# Patient Record
Sex: MALE | Race: WHITE | NOT HISPANIC OR LATINO | URBAN - METROPOLITAN AREA
[De-identification: names, ages, dates, MRNs, and addresses within clinical notes are randomized per-mention and may not be internally consistent; named-entity substitution may affect disease eponyms.]

---

## 2017-04-13 ENCOUNTER — EMERGENCY (EMERGENCY)
Facility: HOSPITAL | Age: 77
LOS: 1 days | Discharge: HOME | End: 2017-04-13
Attending: EMERGENCY MEDICINE
Payer: MEDICARE

## 2017-04-13 VITALS
OXYGEN SATURATION: 97 % | SYSTOLIC BLOOD PRESSURE: 136 MMHG | TEMPERATURE: 99 F | RESPIRATION RATE: 18 BRPM | WEIGHT: 160.06 LBS | DIASTOLIC BLOOD PRESSURE: 75 MMHG | HEART RATE: 77 BPM | HEIGHT: 71 IN

## 2017-04-13 DIAGNOSIS — K59.00 CONSTIPATION, UNSPECIFIED: ICD-10-CM

## 2017-04-13 DIAGNOSIS — J00 ACUTE NASOPHARYNGITIS [COMMON COLD]: ICD-10-CM

## 2017-04-13 LAB
ALBUMIN SERPL ELPH-MCNC: 3.6 G/DL — SIGNIFICANT CHANGE UP (ref 3.5–5)
ALP SERPL-CCNC: 48 U/L — SIGNIFICANT CHANGE UP (ref 40–120)
ALT FLD-CCNC: 27 U/L DA — SIGNIFICANT CHANGE UP (ref 10–60)
ANION GAP SERPL CALC-SCNC: 7 MMOL/L — SIGNIFICANT CHANGE UP (ref 5–17)
APPEARANCE UR: CLEAR — SIGNIFICANT CHANGE UP
AST SERPL-CCNC: 30 U/L — SIGNIFICANT CHANGE UP (ref 10–40)
BILIRUB SERPL-MCNC: 0.4 MG/DL — SIGNIFICANT CHANGE UP (ref 0.2–1.2)
BILIRUB UR-MCNC: NEGATIVE — SIGNIFICANT CHANGE UP
BUN SERPL-MCNC: 10 MG/DL — SIGNIFICANT CHANGE UP (ref 7–18)
CALCIUM SERPL-MCNC: 8.6 MG/DL — SIGNIFICANT CHANGE UP (ref 8.4–10.5)
CHLORIDE SERPL-SCNC: 96 MMOL/L — SIGNIFICANT CHANGE UP (ref 96–108)
CO2 SERPL-SCNC: 29 MMOL/L — SIGNIFICANT CHANGE UP (ref 22–31)
COLOR SPEC: YELLOW — SIGNIFICANT CHANGE UP
CREAT SERPL-MCNC: 0.56 MG/DL — SIGNIFICANT CHANGE UP (ref 0.5–1.3)
DIFF PNL FLD: NEGATIVE — SIGNIFICANT CHANGE UP
EOSINOPHIL NFR BLD AUTO: 1 % — SIGNIFICANT CHANGE UP (ref 0–6)
GLUCOSE SERPL-MCNC: 125 MG/DL — HIGH (ref 70–99)
GLUCOSE UR QL: NEGATIVE — SIGNIFICANT CHANGE UP
HCT VFR BLD CALC: 36.6 % — LOW (ref 39–50)
HGB BLD-MCNC: 12.7 G/DL — LOW (ref 13–17)
KETONES UR-MCNC: NEGATIVE — SIGNIFICANT CHANGE UP
LEUKOCYTE ESTERASE UR-ACNC: NEGATIVE — SIGNIFICANT CHANGE UP
LYMPHOCYTES # BLD AUTO: 60 % — HIGH (ref 13–44)
MANUAL DIF COMMENT BLD-IMP: SIGNIFICANT CHANGE UP
MCHC RBC-ENTMCNC: 34.4 PG — HIGH (ref 27–34)
MCHC RBC-ENTMCNC: 34.8 GM/DL — SIGNIFICANT CHANGE UP (ref 32–36)
MCV RBC AUTO: 98.8 FL — SIGNIFICANT CHANGE UP (ref 80–100)
MONOCYTES NFR BLD AUTO: 1 % — LOW (ref 2–14)
NEUTROPHILS NFR BLD AUTO: 26 % — LOW (ref 43–77)
NITRITE UR-MCNC: NEGATIVE — SIGNIFICANT CHANGE UP
PH UR: 7 — SIGNIFICANT CHANGE UP (ref 4.8–8)
PLAT MORPH BLD: NORMAL — SIGNIFICANT CHANGE UP
PLATELET # BLD AUTO: 175 K/UL — SIGNIFICANT CHANGE UP (ref 150–400)
POTASSIUM SERPL-MCNC: 4.2 MMOL/L — SIGNIFICANT CHANGE UP (ref 3.5–5.3)
POTASSIUM SERPL-SCNC: 4.2 MMOL/L — SIGNIFICANT CHANGE UP (ref 3.5–5.3)
PROT SERPL-MCNC: 6.4 G/DL — SIGNIFICANT CHANGE UP (ref 6–8.3)
PROT UR-MCNC: NEGATIVE — SIGNIFICANT CHANGE UP
RBC # BLD: 3.7 M/UL — LOW (ref 4.2–5.8)
RBC # FLD: 11 % — SIGNIFICANT CHANGE UP (ref 10.3–14.5)
RBC BLD AUTO: NORMAL — SIGNIFICANT CHANGE UP
SODIUM SERPL-SCNC: 132 MMOL/L — LOW (ref 135–145)
SP GR SPEC: 1.01 — SIGNIFICANT CHANGE UP (ref 1.01–1.02)
UROBILINOGEN FLD QL: NEGATIVE — SIGNIFICANT CHANGE UP
VARIANT LYMPHS # BLD: 12 % — HIGH (ref 0–6)
WBC # BLD: 12.8 K/UL — HIGH (ref 3.8–10.5)
WBC # FLD AUTO: 12.8 K/UL — HIGH (ref 3.8–10.5)

## 2017-04-13 PROCEDURE — 99283 EMERGENCY DEPT VISIT LOW MDM: CPT | Mod: 25

## 2017-04-13 PROCEDURE — 81003 URINALYSIS AUTO W/O SCOPE: CPT

## 2017-04-13 PROCEDURE — 74018 RADEX ABDOMEN 1 VIEW: CPT

## 2017-04-13 PROCEDURE — 80053 COMPREHEN METABOLIC PANEL: CPT

## 2017-04-13 PROCEDURE — 99283 EMERGENCY DEPT VISIT LOW MDM: CPT

## 2017-04-13 PROCEDURE — 74000: CPT | Mod: 26

## 2017-04-13 PROCEDURE — 85027 COMPLETE CBC AUTOMATED: CPT

## 2017-04-13 RX ORDER — SODIUM CHLORIDE 9 MG/ML
1000 INJECTION INTRAMUSCULAR; INTRAVENOUS; SUBCUTANEOUS ONCE
Qty: 0 | Refills: 0 | Status: COMPLETED | OUTPATIENT
Start: 2017-04-13 | End: 2017-04-13

## 2017-04-13 RX ADMIN — SODIUM CHLORIDE 1000 MILLILITER(S): 9 INJECTION INTRAMUSCULAR; INTRAVENOUS; SUBCUTANEOUS at 09:27

## 2017-04-13 NOTE — ED PROVIDER NOTE - NS ED MD SCRIBE ATTENDING SCRIBE SECTIONS
REVIEW OF SYSTEMS/PROGRESS NOTE/HIV/HISTORY OF PRESENT ILLNESS/VITAL SIGNS( Pullset)/PHYSICAL EXAM/DISPOSITION

## 2017-04-13 NOTE — ED PROVIDER NOTE - OBJECTIVE STATEMENT
77 y/o male with PMHx of CLL (olast labs checked Jan 2017), Anxiety, Depression, Umbilical hernia, Cataracts s/p surgery, Varicocele, Hydrocele presents to the ED for feeling "cold at night" (R leg colder than L) and constipation x 1 month. Pt tried to take Doculax to mild relief in Sx. Pt states that he has not seen his PMD for his Sx, but has an appointment later today. Pt also notes that he has been taking Lexapro Rx by Psychiatrist recently. Pt denies fever, chills, n/v/d, difficulty walking/balance or any other complaints. NKDA. PMD: Dr. Ulices Farias.

## 2017-04-13 NOTE — ED PROVIDER NOTE - MEDICAL DECISION MAKING DETAILS
Plan to check labs, urine then reassess. Plan to check labs, urine then reassess.  mild hyponatremia. patient will followup with Dr Farias today.

## 2017-04-13 NOTE — ED ADULT TRIAGE NOTE - CHIEF COMPLAINT QUOTE
Patient c/o feeling cold and has constipation as per EMS. Patient c/o feeling cold and has constipation x 4 days as per EMS.

## 2017-04-13 NOTE — ED PROVIDER NOTE - PMH
Anxiety    Cataract    CLL (chronic lymphocytic leukemia)    Depression    Hydrocele    Prostate cancer    Umbilical hernia    Varicocele

## 2017-04-16 ENCOUNTER — EMERGENCY (EMERGENCY)
Facility: HOSPITAL | Age: 77
LOS: 1 days | Discharge: HOME | End: 2017-04-16
Attending: EMERGENCY MEDICINE
Payer: MEDICARE

## 2017-04-16 VITALS
HEIGHT: 71 IN | RESPIRATION RATE: 16 BRPM | OXYGEN SATURATION: 100 % | TEMPERATURE: 98 F | WEIGHT: 179.9 LBS | SYSTOLIC BLOOD PRESSURE: 138 MMHG | HEART RATE: 64 BPM | DIASTOLIC BLOOD PRESSURE: 77 MMHG

## 2017-04-16 VITALS
RESPIRATION RATE: 14 BRPM | SYSTOLIC BLOOD PRESSURE: 113 MMHG | OXYGEN SATURATION: 100 % | HEART RATE: 70 BPM | DIASTOLIC BLOOD PRESSURE: 52 MMHG

## 2017-04-16 DIAGNOSIS — T73.0XXA STARVATION, INITIAL ENCOUNTER: ICD-10-CM

## 2017-04-16 DIAGNOSIS — Z91.040 LATEX ALLERGY STATUS: ICD-10-CM
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
## 2017-04-16 DIAGNOSIS — Z88.8 ALLERGY STATUS TO OTHER DRUGS, MEDICAMENTS AND BIOLOGICAL SUBSTANCES STATUS: ICD-10-CM
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
## 2017-04-16 PROCEDURE — 99283 EMERGENCY DEPT VISIT LOW MDM: CPT | Mod: 25

## 2017-04-16 PROCEDURE — 99283 EMERGENCY DEPT VISIT LOW MDM: CPT

## 2017-04-16 PROCEDURE — 36000 PLACE NEEDLE IN VEIN: CPT

## 2017-04-16 RX ORDER — SODIUM CHLORIDE 9 MG/ML
1000 INJECTION INTRAMUSCULAR; INTRAVENOUS; SUBCUTANEOUS ONCE
Qty: 0 | Refills: 0 | Status: COMPLETED | OUTPATIENT
Start: 2017-04-16 | End: 2017-04-16

## 2017-04-16 RX ADMIN — SODIUM CHLORIDE 4000 MILLILITER(S): 9 INJECTION INTRAMUSCULAR; INTRAVENOUS; SUBCUTANEOUS at 06:24

## 2017-04-16 NOTE — ED PROVIDER NOTE - OBJECTIVE STATEMENT
76M with h/o right inguinal hernia, having social issues at home (reports feeling cold, not having money to buy food) and worried about losing his health insurance, here tonight because he was hungry and wanted to speak with a . Scheduled for surgical appointment for hernia repair in 1 day.

## 2017-04-22 ENCOUNTER — EMERGENCY (EMERGENCY)
Facility: HOSPITAL | Age: 77
LOS: 1 days | Discharge: ANOTHER HOSPITAL | End: 2017-04-22
Attending: EMERGENCY MEDICINE
Payer: MEDICARE

## 2017-04-22 VITALS
OXYGEN SATURATION: 99 % | HEIGHT: 71 IN | WEIGHT: 160.06 LBS | DIASTOLIC BLOOD PRESSURE: 84 MMHG | HEART RATE: 77 BPM | SYSTOLIC BLOOD PRESSURE: 155 MMHG | TEMPERATURE: 98 F | RESPIRATION RATE: 16 BRPM

## 2017-04-22 VITALS
RESPIRATION RATE: 18 BRPM | SYSTOLIC BLOOD PRESSURE: 134 MMHG | HEART RATE: 68 BPM | TEMPERATURE: 98 F | DIASTOLIC BLOOD PRESSURE: 74 MMHG | OXYGEN SATURATION: 96 %

## 2017-04-22 DIAGNOSIS — Z85.46 PERSONAL HISTORY OF MALIGNANT NEOPLASM OF PROSTATE: ICD-10-CM

## 2017-04-22 DIAGNOSIS — F41.9 ANXIETY DISORDER, UNSPECIFIED: ICD-10-CM

## 2017-04-22 DIAGNOSIS — R62.7 ADULT FAILURE TO THRIVE: ICD-10-CM

## 2017-04-22 DIAGNOSIS — Z91.040 LATEX ALLERGY STATUS: ICD-10-CM

## 2017-04-22 DIAGNOSIS — F32.9 MAJOR DEPRESSIVE DISORDER, SINGLE EPISODE, UNSPECIFIED: ICD-10-CM

## 2017-04-22 DIAGNOSIS — Z85.6 PERSONAL HISTORY OF LEUKEMIA: ICD-10-CM

## 2017-04-22 DIAGNOSIS — R68.89 OTHER GENERAL SYMPTOMS AND SIGNS: ICD-10-CM

## 2017-04-22 DIAGNOSIS — R69 ILLNESS, UNSPECIFIED: ICD-10-CM

## 2017-04-22 DIAGNOSIS — Z98.42 CATARACT EXTRACTION STATUS, LEFT EYE: ICD-10-CM

## 2017-04-22 DIAGNOSIS — F32.89 OTHER SPECIFIED DEPRESSIVE EPISODES: ICD-10-CM

## 2017-04-22 DIAGNOSIS — Z88.4 ALLERGY STATUS TO ANESTHETIC AGENT: ICD-10-CM

## 2017-04-22 LAB
ALBUMIN SERPL ELPH-MCNC: 3.6 G/DL — SIGNIFICANT CHANGE UP (ref 3.5–5)
ALP SERPL-CCNC: 47 U/L — SIGNIFICANT CHANGE UP (ref 40–120)
ALT FLD-CCNC: 33 U/L DA — SIGNIFICANT CHANGE UP (ref 10–60)
ANION GAP SERPL CALC-SCNC: 7 MMOL/L — SIGNIFICANT CHANGE UP (ref 5–17)
APAP SERPL-MCNC: 2 UG/ML — LOW (ref 10–30)
APPEARANCE UR: CLEAR — SIGNIFICANT CHANGE UP
AST SERPL-CCNC: 29 U/L — SIGNIFICANT CHANGE UP (ref 10–40)
BASOPHILS # BLD AUTO: 0.1 K/UL — SIGNIFICANT CHANGE UP (ref 0–0.2)
BASOPHILS NFR BLD AUTO: 0.8 % — SIGNIFICANT CHANGE UP (ref 0–2)
BILIRUB SERPL-MCNC: 0.3 MG/DL — SIGNIFICANT CHANGE UP (ref 0.2–1.2)
BILIRUB UR-MCNC: NEGATIVE — SIGNIFICANT CHANGE UP
BUN SERPL-MCNC: 14 MG/DL — SIGNIFICANT CHANGE UP (ref 7–18)
CALCIUM SERPL-MCNC: 8.6 MG/DL — SIGNIFICANT CHANGE UP (ref 8.4–10.5)
CHLORIDE SERPL-SCNC: 96 MMOL/L — SIGNIFICANT CHANGE UP (ref 96–108)
CO2 SERPL-SCNC: 28 MMOL/L — SIGNIFICANT CHANGE UP (ref 22–31)
COLOR SPEC: YELLOW — SIGNIFICANT CHANGE UP
CREAT SERPL-MCNC: 0.54 MG/DL — SIGNIFICANT CHANGE UP (ref 0.5–1.3)
DIFF PNL FLD: ABNORMAL
EOSINOPHIL # BLD AUTO: 0.1 K/UL — SIGNIFICANT CHANGE UP (ref 0–0.5)
EOSINOPHIL NFR BLD AUTO: 0.5 % — SIGNIFICANT CHANGE UP (ref 0–6)
ETHANOL SERPL-MCNC: <3 MG/DL — SIGNIFICANT CHANGE UP (ref 0–10)
GLUCOSE SERPL-MCNC: 97 MG/DL — SIGNIFICANT CHANGE UP (ref 70–99)
GLUCOSE UR QL: NEGATIVE — SIGNIFICANT CHANGE UP
HCT VFR BLD CALC: 37.4 % — LOW (ref 39–50)
HGB BLD-MCNC: 12.8 G/DL — LOW (ref 13–17)
KETONES UR-MCNC: NEGATIVE — SIGNIFICANT CHANGE UP
LEUKOCYTE ESTERASE UR-ACNC: NEGATIVE — SIGNIFICANT CHANGE UP
LYMPHOCYTES # BLD AUTO: 64.8 % — HIGH (ref 13–44)
LYMPHOCYTES # BLD AUTO: 9.7 K/UL — HIGH (ref 1–3.3)
MCHC RBC-ENTMCNC: 33.8 PG — SIGNIFICANT CHANGE UP (ref 27–34)
MCHC RBC-ENTMCNC: 34.2 GM/DL — SIGNIFICANT CHANGE UP (ref 32–36)
MCV RBC AUTO: 98.9 FL — SIGNIFICANT CHANGE UP (ref 80–100)
MONOCYTES # BLD AUTO: 0.4 K/UL — SIGNIFICANT CHANGE UP (ref 0–0.9)
MONOCYTES NFR BLD AUTO: 2.6 % — SIGNIFICANT CHANGE UP (ref 2–14)
NEUTROPHILS # BLD AUTO: 4.7 K/UL — SIGNIFICANT CHANGE UP (ref 1.8–7.4)
NEUTROPHILS NFR BLD AUTO: 31.4 % — LOW (ref 43–77)
NITRITE UR-MCNC: NEGATIVE — SIGNIFICANT CHANGE UP
PCP SPEC-MCNC: SIGNIFICANT CHANGE UP
PH UR: 6 — SIGNIFICANT CHANGE UP (ref 5–8)
PLATELET # BLD AUTO: 206 K/UL — SIGNIFICANT CHANGE UP (ref 150–400)
POTASSIUM SERPL-MCNC: 3.9 MMOL/L — SIGNIFICANT CHANGE UP (ref 3.5–5.3)
POTASSIUM SERPL-SCNC: 3.9 MMOL/L — SIGNIFICANT CHANGE UP (ref 3.5–5.3)
PROT SERPL-MCNC: 6.5 G/DL — SIGNIFICANT CHANGE UP (ref 6–8.3)
PROT UR-MCNC: 15
RBC # BLD: 3.78 M/UL — LOW (ref 4.2–5.8)
RBC # FLD: 11.5 % — SIGNIFICANT CHANGE UP (ref 10.3–14.5)
SODIUM SERPL-SCNC: 131 MMOL/L — LOW (ref 135–145)
SP GR SPEC: 1.02 — SIGNIFICANT CHANGE UP (ref 1.01–1.02)
TSH SERPL-MCNC: 2.06 UU/ML — SIGNIFICANT CHANGE UP (ref 0.34–4.82)
UROBILINOGEN FLD QL: NEGATIVE — SIGNIFICANT CHANGE UP
WBC # BLD: 15 K/UL — HIGH (ref 3.8–10.5)
WBC # FLD AUTO: 15 K/UL — HIGH (ref 3.8–10.5)

## 2017-04-22 PROCEDURE — 93005 ELECTROCARDIOGRAM TRACING: CPT

## 2017-04-22 PROCEDURE — 71010: CPT | Mod: 26

## 2017-04-22 PROCEDURE — 84443 ASSAY THYROID STIM HORMONE: CPT

## 2017-04-22 PROCEDURE — 80053 COMPREHEN METABOLIC PANEL: CPT

## 2017-04-22 PROCEDURE — 99285 EMERGENCY DEPT VISIT HI MDM: CPT | Mod: 25

## 2017-04-22 PROCEDURE — 70450 CT HEAD/BRAIN W/O DYE: CPT | Mod: 26

## 2017-04-22 PROCEDURE — 71045 X-RAY EXAM CHEST 1 VIEW: CPT

## 2017-04-22 PROCEDURE — 81001 URINALYSIS AUTO W/SCOPE: CPT

## 2017-04-22 PROCEDURE — 85027 COMPLETE CBC AUTOMATED: CPT

## 2017-04-22 PROCEDURE — 70450 CT HEAD/BRAIN W/O DYE: CPT

## 2017-04-22 PROCEDURE — 80307 DRUG TEST PRSMV CHEM ANLYZR: CPT

## 2017-04-22 NOTE — ED BEHAVIORAL HEALTH NOTE - BEHAVIORAL HEALTH NOTE
TELEPSYCHIATRY ENCOUNTER  **********************************************	  I have visualized that the patient is on an arms-length 1:1  I have visualized that patient is in a private space  I have confirmed with the patient that they understand and agree to the evaluation being performed via Telepsychiatry  I have discussed the above with Telepsychiatry Attending Dr. Collins  ************************************************  Behavioral Telehealth Care Manager COLLATERAL NOTE:  ************************************************  CHART REVIEW: 76 year old male brought into [facility] ED via EMS with complaints of blurred vision s/p cataract surgery 2 weeks ago. Pt also had complaints of feeling hot and cold at times-Similar complaints in the past with multiple ER visits over the last couple weeks. Patient remained calm and cooperative during ED Course. Pt did not require any PRN medications or restraints.   ~Sunrise~ REVIEWED  ~Alpha~ N/A  ~CVM~ N/A  ~TIER~ N/A  ~Healthix~ N/A  ~Psyckes~ N/A  *************************************************  COLLATERAL CONTACT: MARYANNE CAMPOS  *************************************************  *NUMBER: [phone]  *RELATIONSHIP: Niece  *RELIABILITY: Reliable  *OPINION RE PATIENT RELIABILITY: Reliable  *OPINION RE CONCERN FOR DANGEROUSNESS: Concerns that patient is not caring for self.   *AFTERCARE ROLE: If decision is made to release patient, niece will transport patient home.  *PSYCHOEDUCATION: Reviewed role of Emergency Department, nature of voluntary vs involuntary hospitalizations, support groups for caregivers.  **********************************************	  CORE HISTORY PROVIDED BY: MARYANNE CAMPOS  **********************************************  *DEMOGRAPHICS:  Pt is a 76 year old  male domiciled alone in an apartment in [place]. Patient is single and non-caregiver. Patient is retired. Patient has [employer] Medicare insurance.   *DEPENDENTS: None  *HPI/PAST PSYCH:   Patient has history of anxiety. As per patient’s niece he was hospitalized once back in 1970- details unclear at this time. Patient has outpatient psychiatrist Dr. Josue Patel [phone] weekly therapy.  *SUICIDALITY: No known history of suicidal ideations or attempts. No history of self injurious behaviors.   *VIOLENCE:   No history of violence or aggression.   * SUBSTANCE: No history of ETOH or illicit substance use.   * ARREST: No history of arrest.   * MEDICAL:  History of Chronic Lymphocytic Leukemia, Cataract Surgery x 2 weeks ago, Varicocele, Hydrocele, HX Prostate CA, Umbilical Hernia- Surgery Scheduled 5/1/17.  * MEDICATIONS: Unable to obtain from collateral  *TODAY’S ED VISIT: 76 year old male brought in by EMS with complaints of blurred vision s/p cataracts surgery 2 weeks ago. Pt also complaining of being hot/cold-Patient had multiple ER visits over the last couple of weeks with similar complaints. Patient MD- Dr. Ulices Farias [phone] was concerns about patient and requesting psychiatric evaluation- feels complaints are psychosomatic.   [facility] spoke with Dr. Farias- He reports that patient was doing well medically over the last couple of months. During the last couple of weeks patient has been ‘acting Odd”- primary since patient found out he needed surgery for hernia- Family reported to Dr. Farias that he came to their house last week very disshelved and reported he was not eating or sleeping.   **********************************************   ADDITIONAL HISTORY PROVIDED BY: MARYANNE CAMPOS  **********************************************   *HPI: At baseline patient does well following with outpatient psychiatrist. Over the last couple of weeks patient has had medical complaints and multiple ED visits. Dr. Farias reports that since patient found out he needed Hernia Surgery he has been “acting odd”. Patient’s niece reports that patient has extremely high anxiety with 1 past hospitalization during the 1970’s. Patient has been obsessing about hernia surgery which is scheduled for 5/1/17. Niece reports that patient does not do well with stress or change. Niece reports that patient came to their house on Easter Sunday Disshelved and stated he was not eating or sleeping well. Niece concerned that patient is not caring for self. Patient’s brother Kirill passed away 1yr ago- he was patient’s primary support. No prior history of hallucinations/delusions/psychosis. No history of suicidal ideations/attempts. No history of homicidal ideations. No history of Violence or aggression. No legal history.   *FAMILY HISTORY:  No known family psychiatric history.   *SOCIAL HISTORY: No known history of patient being victim/witness/perpetrator of abuse. No known access to weapons or guns. Patient has limited family support. Pt’s brother passed away 1yr ago who was patient’s primary support. Pt’s sister –martin 84yr old, niece and nephew are willing to assist at times.  *DISPOSITION: Transfer to [facility] Accepting Doc- Dr. Downing Voluntary 9.13 status  ***********************************************   I have discussed the above information with Telepsychiatry Attending: Dr. Collins  ************************************************

## 2017-04-22 NOTE — ED ADULT NURSE NOTE - OBJECTIVE STATEMENT
Patient presented to the ED c/o "cold from leukemia " . Patient stated felt cold at home and wishes to have warm blankets. Denies chest pain, dizziness, SOB, lightheadedness , N/V/D ; however, stated that even though had a successful left eye cataract surgery , feels his left vison is blurry. Denied blood drawn as stated was recently here and wishes to have his evaluation based on those results. Patient was provided with two warm blankets and EKG performed.

## 2017-04-22 NOTE — ED BEHAVIORAL HEALTH ASSESSMENT NOTE - CURRENT MEDICATION
Patient is prescribed celexa but never started taking it. Patient has a prescription for xanax 0.25mg PRN anxiety but takes a quarter of this dose when anxious.

## 2017-04-22 NOTE — ED BEHAVIORAL HEALTH ASSESSMENT NOTE - DIFFERENTIAL
depressive d/o not otherwise specified, r/o major depressive disorder vs 2/2 generalized medical condition, anxiety d/o not otherwise specified

## 2017-04-22 NOTE — ED BEHAVIORAL HEALTH ASSESSMENT NOTE - CASE SUMMARY
76 year-old male with past history of depression and anxiety; failing outpatient psychiatric care and not able to function on his usual baseline. The patient would potentially benefit from an inpatient psychiatric hospitalization for safety and stability.

## 2017-04-22 NOTE — ED ADULT NURSE REASSESSMENT NOTE - NS ED NURSE REASSESS COMMENT FT1
spoke with tele psych, asking if patient will be admitted, md moseley informed. pending tele psych, no behavioral problems at this time

## 2017-04-22 NOTE — ED PROVIDER NOTE - CARE PLAN
Principal Discharge DX:	Cold feeling Principal Discharge DX:	Cold feeling  Secondary Diagnosis:	Other depression

## 2017-04-22 NOTE — ED BEHAVIORAL HEALTH ASSESSMENT NOTE - DESCRIPTION
anxious PMhx significant for prostate cancer, CLL, recent cataract surgery 2 weeks ago, and upcoming surgery for a hernia repair. NKDA see HPI

## 2017-04-22 NOTE — ED BEHAVIORAL HEALTH ASSESSMENT NOTE - SUMMARY
Patient is a 77 y/o  M with longstanding PPhx of anxiety, no hx of inpt hospitalizations, no hx of suicide attempts or violence, no legal problems, no hx of substance use, PMhx significant for prostate cancer, CLL, recent cataract surgery 2 weeks ago, and upcoming surgery for a hernia repair. Patient self presents to the ED for the 3rd time in 2 weeks for nonspecific somatic complaints accompanied by worsening anxious and depressed mood, panic attacks, anhedonia, insomnia, and significant weight loss. Patient requests voluntary hospitalization at this time for safety and stabilization.

## 2017-04-22 NOTE — ED BEHAVIORAL HEALTH ASSESSMENT NOTE - OTHER
kulwinder and psychiatrist- Dr. Patel unable to assess as the patient remain lying down on the bed. Per ED note, no abnormality.

## 2017-04-22 NOTE — ED BEHAVIORAL HEALTH ASSESSMENT NOTE - HPI (INCLUDE ILLNESS QUALITY, SEVERITY, DURATION, TIMING, CONTEXT, MODIFYING FACTORS, ASSOCIATED SIGNS AND SYMPTOMS)
Patient is a 75 y/o  M, never , noncaregiver, domiciled alone, with longstanding PPhx of anxiety, no hx of inpt hospitalizations, no hx of suicide attempts or violence, no legal problems, no hx of substance use, PMhx significant for prostate cancer, CLL, recent cataract surgery 2 weeks ago, and upcoming surgery Patient is a 77 y/o  M, never , noncaregiver, domiciled alone, employed per [employer] as an , with longstanding PPhx of anxiety, no hx of inpt hospitalizations, no hx of suicide attempts or violence, no legal problems, no hx of substance use, PMhx significant for prostate cancer, CLL, recent cataract surgery 2 weeks ago, and upcoming surgery for a hernia repair. Patient self presents to the ED for the 3rd time in 2 weeks for nonspecific complaints related to his health including nightsweats, cold intolerance, and blurry vision. Patient has had numerous medical workups in the past couple weeks which have not shown any acute illness. Psych was consulted as patient appears anxious.     Patient interviewed via telepsych. Patient states that he has been feeling increasingly anxious about his health as he recently had a cataract surgery, has an upcoming surgery for hernia repair, and has a number of somatic complaints such are nightsweats which are concerning given his hx of cancer. Patient states that most mornings he wakes up with panic symptoms including tachycardia, SOB, and chest tightness. Patient endorses depressed mood and feeling isolated, as he is single and does not have any family nearby. He reports only sleeping 3-4 hrs per night and feeling poor energy level and decreased concentration during the day. He reports decreased appetite with 15 lb weight loss in the past 3 months. Patient reports feeling hopeless and feels that he could benefit from inpatient hospitalization at this time.     Patient denies SI/HI/I/P, urges for SIB, or aggressive I/I/P. He denies paranoid ideation or perceptual disturbances. He denies current or past symptoms of omer. He denies substance use. He denies hx of trauma or symptoms consistent with post-traumatic stress disorder.    Collateral information obtained from pt's psychiatrist, Dr. Patel, who has significant concerns for patient as he is single, domiciled alone, isolated from his family, and noncompliant with medications due to concerns about side effects. He states that pt is not caring for himself at home, such as not eating properly, and has lost a significant amount of weight recently. He states that he has attempted to encourage patient to take an SSRI such as lexapro but patient has only been intermittently compliant. He believes that pt requires inpt hospitalization at this time as he has numerous risk factors and is failing outpatient treatment. Patient is a 75 y/o  M, never , noncaregiver, domiciled alone, employed per [employer] as an , with longstanding PPhx of anxiety, no hx of inpt hospitalizations, no hx of suicide attempts or violence, no legal problems, no hx of substance use, PMhx significant for prostate cancer, CLL, recent cataract surgery 2 weeks ago, and upcoming surgery for a hernia repair. Patient self presents to the ED for the 3rd time in 2 weeks for nonspecific complaints related to his health including night sweats, cold intolerance, and blurry vision. Patient has had numerous medical workups in the past couple weeks which have not shown any acute illness. Psych was consulted as patient appears anxious.     Patient interviewed via telepsych. Patient states that he has been feeling increasingly anxious about his health as he recently had a cataract surgery, has an upcoming surgery for hernia repair, and has a number of somatic complaints such are night sweats which are concerning given his hx of cancer. Patient states that most mornings he wakes up with panic symptoms including tachycardia, SOB, and chest tightness. Patient endorses depressed mood and feeling isolated, as he is single and does not have any family nearby. He reports only sleeping 3-4 hrs per night and feeling poor energy level and decreased concentration during the day. He reports decreased appetite with 15 lb weight loss in the past 3 months. Patient reports feeling hopeless and feels that he could benefit from inpatient hospitalization at this time.     Patient denies SI/HI/I/P, urges for SIB, or aggressive I/I/P. He denies paranoid ideation or perceptual disturbances. He denies current or past symptoms of omer. He denies substance use. He denies hx of trauma or symptoms consistent with post-traumatic stress disorder.    Collateral information obtained from pt's psychiatrist, Dr. Patel, who has significant concerns for patient as he is single, domiciled alone, isolated from his family, and noncompliant with medications due to concerns about side effects. He states that pt is not caring for himself at home, such as not eating properly, and has lost a significant amount of weight recently. He states that he has attempted to encourage patient to take an SSRI such as lexapro but patient has only been intermittently compliant. He believes that pt requires inpt hospitalization at this time as he has numerous risk factors and is failing outpatient treatment.

## 2017-04-22 NOTE — ED BEHAVIORAL HEALTH ASSESSMENT NOTE - AXIS III
PMhx significant for prostate cancer, CLL, recent cataract surgery 2 weeks ago, and upcoming surgery for a hernia repair.

## 2017-04-22 NOTE — ED BEHAVIORAL HEALTH ASSESSMENT NOTE - OTHER PAST PSYCHIATRIC HISTORY (INCLUDE DETAILS REGARDING ONSET, COURSE OF ILLNESS, INPATIENT/OUTPATIENT TREATMENT)
Per patient's psychiatrist he has a hx of anxiety, OCD, and hoarding disorder. He is not currently taking psychotropic medications as he is worried about side effects. No hx of inpt hospitalizations or suicide attempts.

## 2017-04-22 NOTE — ED BEHAVIORAL HEALTH ASSESSMENT NOTE - RISK ASSESSMENT
Risk factors include mood episode, global anxiety, insomnia, non  status, elderly male, noncompliant with outpatient treatment, and multiple acute medical problems. At this time pt is at high imminent risk of harm and could benefit from inpt hospitalization for safety and stabilization.

## 2017-04-22 NOTE — ED PROVIDER NOTE - OBJECTIVE STATEMENT
77 y/o male with PMHx of CLL, Anxiety, Depression, Umbilical hernia, Cataracts s/p surgery, Varicocele, Hydrocele presents to the ED for feeling cold today. Pt states that despite the heat and several blanket use, pt was having difficulty sleeping because he was feeling cold and then he called 911. Pt was seen in the ED 5 days ago for the same Sx with neg workup. Pt was seen by his PMD 2 days ago and has a hernia surgery scheduled for 5/1/17. Pt denies fever, chills, n/v/d, difficulty walking/balance or any other complaints. Allergies: Lidocaine. PMD: Dr. Ulices Farias. 77 y/o male with PMHx of CLL, Anxiety, Depression, Umbilical hernia, Cataracts s/p surgery, Varicocele, Hydrocele presents to the ED for feeling cold today. Pt states that despite the heat and several blanket use, pt was having difficulty sleeping because he was feeling cold and then he called 911. Pt was seen in the ED 5 days ago for the same Sx with neg workup 3 days prior to that on April 13th. Pt was seen by his PMD 2 days ago and has a hernia surgery scheduled for 5/1/17. Pt denies fever, chills, n/v/d, difficulty walking/balance or any other complaints. Allergies: Lidocaine. PMD: Dr. Ulices Farias.

## 2017-04-22 NOTE — ED BEHAVIORAL HEALTH ASSESSMENT NOTE - SUICIDE RISK FACTORS
Global insomnia/Anhedonia/Agitation/severe anxiety/Hopelessness/Mood episode/Chronic pain or acute medical issue

## 2017-04-22 NOTE — ED PROVIDER NOTE - MEDICAL DECISION MAKING DETAILS
Pt with feeling cold now on 3rd visit in the past 2 weeks with the same complaints. No physical exam finding. Will check basic labs and discuss with . Pt with feeling cold now on 3rd visit in the past 2 weeks with the same complaints. No physical exam finding. Will check basic labs and discuss with , will d/w his pmd as well. Pt with feeling cold now on 3rd visit in the past 2 weeks with the same complaints. No physical exam finding. Will check basic labs and discuss with , will get psych consult, D/W Dr. Farias who recommended psych, if needed will admit the patient. Pt with feeling cold now on 3rd visit in the past 2 weeks with the same complaints. No physical exam finding. Will check basic labs and discuss with , will get psych consult, D/W Dr. Farias who recommended psych

## 2017-04-22 NOTE — ED PROVIDER NOTE - PROGRESS NOTE DETAILS
D/W Dr. Farias who notes that he had a conversation with patient's sister who was concerned about how he was acting, recommended psych consult given hx of depression, no current SI/HI, pt does not appear to be a threat to leave at this point in time.

## 2017-04-24 ENCOUNTER — INPATIENT (INPATIENT)
Facility: HOSPITAL | Age: 77
LOS: 3 days | Discharge: PSYCHIATRIC OR SUBSTANCE USE FACILITY | DRG: 641 | End: 2017-04-28
Attending: FAMILY MEDICINE | Admitting: HOSPITALIST
Payer: MEDICARE

## 2017-04-24 VITALS
TEMPERATURE: 98 F | OXYGEN SATURATION: 96 % | SYSTOLIC BLOOD PRESSURE: 111 MMHG | DIASTOLIC BLOOD PRESSURE: 68 MMHG | WEIGHT: 160.06 LBS | HEART RATE: 85 BPM | RESPIRATION RATE: 14 BRPM | HEIGHT: 70 IN

## 2017-04-24 DIAGNOSIS — F32.9 MAJOR DEPRESSIVE DISORDER, SINGLE EPISODE, UNSPECIFIED: ICD-10-CM

## 2017-04-24 DIAGNOSIS — C95.10 CHRONIC LEUKEMIA OF UNSPECIFIED CELL TYPE NOT HAVING ACHIEVED REMISSION: ICD-10-CM

## 2017-04-24 DIAGNOSIS — E87.1 HYPO-OSMOLALITY AND HYPONATREMIA: ICD-10-CM

## 2017-04-24 DIAGNOSIS — H26.9 UNSPECIFIED CATARACT: ICD-10-CM

## 2017-04-24 DIAGNOSIS — Z98.49 CATARACT EXTRACTION STATUS, UNSPECIFIED EYE: Chronic | ICD-10-CM

## 2017-04-24 DIAGNOSIS — C61 MALIGNANT NEOPLASM OF PROSTATE: ICD-10-CM

## 2017-04-24 DIAGNOSIS — F41.9 ANXIETY DISORDER, UNSPECIFIED: ICD-10-CM

## 2017-04-24 DIAGNOSIS — Z29.9 ENCOUNTER FOR PROPHYLACTIC MEASURES, UNSPECIFIED: ICD-10-CM

## 2017-04-24 LAB
ALBUMIN SERPL ELPH-MCNC: 3.6 G/DL — SIGNIFICANT CHANGE UP (ref 3.3–5)
ALP SERPL-CCNC: 48 U/L — SIGNIFICANT CHANGE UP (ref 40–120)
ALT FLD-CCNC: 39 U/L — SIGNIFICANT CHANGE UP (ref 12–78)
ANION GAP SERPL CALC-SCNC: 11 MMOL/L — SIGNIFICANT CHANGE UP (ref 5–17)
APPEARANCE UR: CLEAR — SIGNIFICANT CHANGE UP
APTT BLD: 28.7 SEC — SIGNIFICANT CHANGE UP (ref 27.5–37.4)
AST SERPL-CCNC: 33 U/L — SIGNIFICANT CHANGE UP (ref 15–37)
BILIRUB SERPL-MCNC: 0.5 MG/DL — SIGNIFICANT CHANGE UP (ref 0.2–1.2)
BILIRUB UR-MCNC: NEGATIVE — SIGNIFICANT CHANGE UP
BUN SERPL-MCNC: 18 MG/DL — SIGNIFICANT CHANGE UP (ref 7–23)
CALCIUM SERPL-MCNC: 8.5 MG/DL — SIGNIFICANT CHANGE UP (ref 8.5–10.1)
CHLORIDE SERPL-SCNC: 84 MMOL/L — LOW (ref 96–108)
CO2 SERPL-SCNC: 24 MMOL/L — SIGNIFICANT CHANGE UP (ref 22–31)
COLOR SPEC: YELLOW — SIGNIFICANT CHANGE UP
CREAT SERPL-MCNC: 0.64 MG/DL — SIGNIFICANT CHANGE UP (ref 0.5–1.3)
DIFF PNL FLD: ABNORMAL
EPI CELLS # UR: SIGNIFICANT CHANGE UP
GLUCOSE SERPL-MCNC: 121 MG/DL — HIGH (ref 70–99)
GLUCOSE UR QL: 50 MG/DL
HCT VFR BLD CALC: 37.2 % — LOW (ref 39–50)
HGB BLD-MCNC: 13.2 G/DL — SIGNIFICANT CHANGE UP (ref 13–17)
INR BLD: 0.96 RATIO — SIGNIFICANT CHANGE UP (ref 0.88–1.16)
KETONES UR-MCNC: NEGATIVE — SIGNIFICANT CHANGE UP
LACTATE SERPL-SCNC: 1.4 MMOL/L — SIGNIFICANT CHANGE UP (ref 0.7–2)
LEUKOCYTE ESTERASE UR-ACNC: NEGATIVE — SIGNIFICANT CHANGE UP
LYMPHOCYTES # BLD AUTO: 70 % — HIGH (ref 13–44)
MAGNESIUM SERPL-MCNC: 2.1 MG/DL — SIGNIFICANT CHANGE UP (ref 1.8–2.4)
MCHC RBC-ENTMCNC: 34.7 PG — HIGH (ref 27–34)
MCHC RBC-ENTMCNC: 35.6 GM/DL — SIGNIFICANT CHANGE UP (ref 32–36)
MCV RBC AUTO: 97.4 FL — SIGNIFICANT CHANGE UP (ref 80–100)
NEUTROPHILS NFR BLD AUTO: 21 % — LOW (ref 43–77)
NITRITE UR-MCNC: NEGATIVE — SIGNIFICANT CHANGE UP
PH UR: 5 — SIGNIFICANT CHANGE UP (ref 5–8)
PHOSPHATE SERPL-MCNC: 3.1 MG/DL — SIGNIFICANT CHANGE UP (ref 2.5–4.5)
PLAT MORPH BLD: NORMAL — SIGNIFICANT CHANGE UP
PLATELET # BLD AUTO: 250 K/UL — SIGNIFICANT CHANGE UP (ref 150–400)
POTASSIUM SERPL-MCNC: 4.4 MMOL/L — SIGNIFICANT CHANGE UP (ref 3.5–5.3)
POTASSIUM SERPL-SCNC: 4.4 MMOL/L — SIGNIFICANT CHANGE UP (ref 3.5–5.3)
PROT SERPL-MCNC: 6.3 G/DL — SIGNIFICANT CHANGE UP (ref 6–8.3)
PROT UR-MCNC: 25 MG/DL
PROTHROM AB SERPL-ACNC: 10.5 SEC — SIGNIFICANT CHANGE UP (ref 9.8–12.7)
RBC # BLD: 3.82 M/UL — LOW (ref 4.2–5.8)
RBC # FLD: 10.6 % — SIGNIFICANT CHANGE UP (ref 10.3–14.5)
RBC BLD AUTO: NORMAL — SIGNIFICANT CHANGE UP
RBC CASTS # UR COMP ASSIST: SIGNIFICANT CHANGE UP /HPF (ref 0–4)
SODIUM SERPL-SCNC: 119 MMOL/L — CRITICAL LOW (ref 135–145)
SP GR SPEC: 1.02 — SIGNIFICANT CHANGE UP (ref 1.01–1.02)
UROBILINOGEN FLD QL: NEGATIVE — SIGNIFICANT CHANGE UP
VARIANT LYMPHS # BLD: 9 % — HIGH (ref 0–6)
WBC # BLD: 19.4 K/UL — HIGH (ref 3.8–10.5)
WBC # FLD AUTO: 19.4 K/UL — HIGH (ref 3.8–10.5)
WBC UR QL: SIGNIFICANT CHANGE UP

## 2017-04-24 PROCEDURE — 99223 1ST HOSP IP/OBS HIGH 75: CPT | Mod: AI,GC

## 2017-04-24 PROCEDURE — 99285 EMERGENCY DEPT VISIT HI MDM: CPT

## 2017-04-24 PROCEDURE — 71010: CPT | Mod: 26

## 2017-04-24 PROCEDURE — 93010 ELECTROCARDIOGRAM REPORT: CPT

## 2017-04-24 RX ORDER — SODIUM CHLORIDE 9 MG/ML
1 INJECTION INTRAMUSCULAR; INTRAVENOUS; SUBCUTANEOUS THREE TIMES A DAY
Qty: 0 | Refills: 0 | Status: DISCONTINUED | OUTPATIENT
Start: 2017-04-24 | End: 2017-04-25

## 2017-04-24 RX ORDER — DOCUSATE SODIUM 100 MG
100 CAPSULE ORAL THREE TIMES A DAY
Qty: 0 | Refills: 0 | Status: DISCONTINUED | OUTPATIENT
Start: 2017-04-24 | End: 2017-04-28

## 2017-04-24 RX ORDER — ALPRAZOLAM 0.25 MG
0.5 TABLET ORAL
Qty: 0 | Refills: 0 | Status: DISCONTINUED | OUTPATIENT
Start: 2017-04-24 | End: 2017-04-24

## 2017-04-24 RX ORDER — VENLAFAXINE HCL 75 MG
37.5 CAPSULE, EXT RELEASE 24 HR ORAL DAILY
Qty: 0 | Refills: 0 | Status: DISCONTINUED | OUTPATIENT
Start: 2017-04-24 | End: 2017-04-24

## 2017-04-24 RX ORDER — ENOXAPARIN SODIUM 100 MG/ML
40 INJECTION SUBCUTANEOUS EVERY 24 HOURS
Qty: 0 | Refills: 0 | Status: DISCONTINUED | OUTPATIENT
Start: 2017-04-24 | End: 2017-04-28

## 2017-04-24 RX ORDER — IBUPROFEN 200 MG
400 TABLET ORAL EVERY 6 HOURS
Qty: 0 | Refills: 0 | Status: DISCONTINUED | OUTPATIENT
Start: 2017-04-24 | End: 2017-04-25

## 2017-04-24 RX ORDER — FINASTERIDE 5 MG/1
5 TABLET, FILM COATED ORAL DAILY
Qty: 0 | Refills: 0 | Status: DISCONTINUED | OUTPATIENT
Start: 2017-04-24 | End: 2017-04-28

## 2017-04-24 RX ORDER — SODIUM CHLORIDE 9 MG/ML
3 INJECTION INTRAMUSCULAR; INTRAVENOUS; SUBCUTANEOUS ONCE
Qty: 0 | Refills: 0 | Status: COMPLETED | OUTPATIENT
Start: 2017-04-24 | End: 2017-04-24

## 2017-04-24 RX ORDER — GABAPENTIN 400 MG/1
300 CAPSULE ORAL THREE TIMES A DAY
Qty: 0 | Refills: 0 | Status: DISCONTINUED | OUTPATIENT
Start: 2017-04-24 | End: 2017-04-28

## 2017-04-24 RX ORDER — VENLAFAXINE HCL 75 MG
18.75 CAPSULE, EXT RELEASE 24 HR ORAL
Qty: 0 | Refills: 0 | Status: DISCONTINUED | OUTPATIENT
Start: 2017-04-24 | End: 2017-04-25

## 2017-04-24 RX ORDER — SODIUM CHLORIDE 9 MG/ML
1000 INJECTION INTRAMUSCULAR; INTRAVENOUS; SUBCUTANEOUS
Qty: 0 | Refills: 0 | Status: COMPLETED | OUTPATIENT
Start: 2017-04-24 | End: 2017-04-24

## 2017-04-24 RX ORDER — TAMSULOSIN HYDROCHLORIDE 0.4 MG/1
0.4 CAPSULE ORAL AT BEDTIME
Qty: 0 | Refills: 0 | Status: DISCONTINUED | OUTPATIENT
Start: 2017-04-24 | End: 2017-04-28

## 2017-04-24 RX ORDER — SENNA PLUS 8.6 MG/1
2 TABLET ORAL AT BEDTIME
Qty: 0 | Refills: 0 | Status: DISCONTINUED | OUTPATIENT
Start: 2017-04-24 | End: 2017-04-28

## 2017-04-24 RX ADMIN — SODIUM CHLORIDE 1000 MILLILITER(S): 9 INJECTION INTRAMUSCULAR; INTRAVENOUS; SUBCUTANEOUS at 19:30

## 2017-04-24 RX ADMIN — TAMSULOSIN HYDROCHLORIDE 0.4 MILLIGRAM(S): 0.4 CAPSULE ORAL at 23:44

## 2017-04-24 RX ADMIN — ENOXAPARIN SODIUM 40 MILLIGRAM(S): 100 INJECTION SUBCUTANEOUS at 23:44

## 2017-04-24 RX ADMIN — SODIUM CHLORIDE 1000 MILLILITER(S): 9 INJECTION INTRAMUSCULAR; INTRAVENOUS; SUBCUTANEOUS at 18:30

## 2017-04-24 RX ADMIN — SODIUM CHLORIDE 1 GRAM(S): 9 INJECTION INTRAMUSCULAR; INTRAVENOUS; SUBCUTANEOUS at 23:43

## 2017-04-24 RX ADMIN — SODIUM CHLORIDE 3 MILLILITER(S): 9 INJECTION INTRAMUSCULAR; INTRAVENOUS; SUBCUTANEOUS at 18:30

## 2017-04-24 RX ADMIN — GABAPENTIN 300 MILLIGRAM(S): 400 CAPSULE ORAL at 23:43

## 2017-04-24 NOTE — H&P ADULT - PROBLEM SELECTOR PLAN 3
continue with xanax PRN  continue with gabapentin continue with ativan PRN  continue with gabapentin, effexor continue with ativan PRN  continue with gabapentin, Effexor

## 2017-04-24 NOTE — H&P ADULT - HISTORY OF PRESENT ILLNESS
76-year-old male with past medical history of major depression disorder, right inguinal hernia, prostate cancer, CLL, anxiety, status post cataract surgery, presents to ED from [facility] with an abnormal lab value – [id number]. patient was recently seen and treated at [facility] for major depressive disorder and hyponatremia, was discharged to [facility] for further treatment. patient states that he has been eating and drinking okay, denies any fever or chills any recent illness. has been feeling increased weakness, some slight confusion earlier, not present now.  Patient states that he's been feeling more cold lately. Had recent cateract surgery a few weeks ago.  patient denies fever, chills, chest pain, vision changes, headache, shortness of breath, ALDANA, abdominal pain, nausea, vomiting, diarrhea, dysuria, numbness, tingling. Patient denies recent sick contacts, recent travel.  Denies suicidal ideation/homicidal ideation.      In ED – labs remarkable for – WBC – 19.4, hematocrit – 37.2, sodium – 119, chloride – 84, glucose – 121, CXR – no acute pulmonary process.  patient received 1L NS bolus

## 2017-04-24 NOTE — H&P ADULT - PMH
Anxiety    Cataracts, bilateral    Chronic leukemia    Depression    Hernia, inguinal, right    Prostate cancer

## 2017-04-24 NOTE — H&P ADULT - ATTENDING COMMENTS
Patient seen and examined, agree with Hx and plan outlined above. Patient A/O X 3, voiced understanding and agreement to aformentioned plan.

## 2017-04-24 NOTE — H&P ADULT - NSHPREVIEWOFSYSTEMS_GEN_ALL_CORE
CONSTITUTIONAL: no fever and no chills. + generalized weakness  CARDIOVASCULAR: normal rate and rhythm, no chest pain and no edema.  RESPIRATORY: no chest pain, no cough. no SOB  GASTROINTESTINAL: no abdominal pain, no nausea, no vomiting, no diarrhea  GENITOURINARY-no dysuria, no hematuria  MUSCULOSKELETAL: no back pain, no musculoskeletal pain, no neck pain  NEURO: no loss of consciousness, no gait abnormality, no headache, no sensory deficits.  PSYCHIATRIC: hx MDD, anxiety CONSTITUTIONAL: no fever and no chills. + generalized weakness  CARDIOVASCULAR: normal rate and rhythm, no chest pain and no edema.  RESPIRATORY: no chest pain, no cough. no SOB  GASTROINTESTINAL: no abdominal pain, no nausea, no vomiting, no diarrhea  GENITOURINARY-no dysuria, no hematuria  MUSCULOSKELETAL: no back pain, no musculoskeletal pain, no neck pain  NEURO: no loss of consciousness, no gait abnormality, no headache, no sensory deficits.  PSYCHIATRIC: hx MDD, anxiety, denies suicidality

## 2017-04-24 NOTE — H&P ADULT - NSHPLABSRESULTS_GEN_ALL_CORE
13.2   19.4  )-----------( 250      ( 24 Apr 2017 18:26 )             37.2       04-24    119<LL>  |  84<L>  |  18  ----------------------------<  121<H>  4.4   |  24  |  0.64    Ca    8.5      24 Apr 2017 18:26  Phos  3.1     04-24  Mg     2.1     04-24    TPro  6.3  /  Alb  3.6  /  TBili  0.5  /  DBili  x   /  AST  33  /  ALT  39  /  AlkPhos  48  04-24      CAPILLARY BLOOD GLUCOSE      I&O's Summary      PT/INR - ( 24 Apr 2017 18:26 )   PT: 10.5 sec;   INR: 0.96 ratio         PTT - ( 24 Apr 2017 18:26 )  PTT:28.7 sec

## 2017-04-24 NOTE — H&P ADULT - PROBLEM SELECTOR PLAN 4
stable, not currently in treatment stable, not currently in treatment  Hem Onc FU as out pt, Dr Estrada

## 2017-04-24 NOTE — H&P ADULT - NSHPPHYSICALEXAM_GEN_ALL_CORE
CONSTITUTIONAL: Well appearing, well nourished,  AAOx3 and in no apparent distress.   HEENT: PERRLA, EOMI, no conjunctival erythema.    CARDIAC: Normal rate, regular rhythm.  Heart sounds S1, S2.  RESPIRATORY: Breath sounds clear and equal bilaterally.  GASTROINTESTINAL: Abdomen soft, non-tender, no guarding, +BS  MUSCULOSKELETAL: Spine appears normal, range of motion is not limited, no muscle or joint tenderness  NEUROLOGICAL: Alert and oriented, no focal deficits, no motor or sensory deficits.  SKIN: warm and dry  EXT: No clubbing, cyanosis, Edema.    PSYCHIATRIC: flat affect CONSTITUTIONAL: Well appearing, well nourished,  AAOx3 and in no apparent distress.   HEENT: PERRLA, EOMI, no conjunctival erythema.    CARDIAC: Normal rate, regular rhythm.  Heart sounds S1, S2.  RESPIRATORY: Breath sounds clear and equal bilaterally.  GASTROINTESTINAL: Abdomen soft, non-tender, no guarding, +BS  MUSCULOSKELETAL: Spine appears normal, range of motion is not limited, no muscle or joint tenderness  NEUROLOGICAL: Alert and oriented, no focal deficits, no motor or sensory deficits.CN 2-12 intact, no tremor, cerebellum intact finger to nose. Mini-cog 3/3.   SKIN: warm and dry  EXT: No clubbing, cyanosis, Edema.    PSYCHIATRIC: flat affect

## 2017-04-24 NOTE — H&P ADULT - PROBLEM SELECTOR PLAN 2
continue with gabapentin  consider psych consult. continue with gabapentin, effexor  consider psych consult. continue with gabapentin, Effexor  consider psych consult.

## 2017-04-24 NOTE — H&P ADULT - PROBLEM SELECTOR PLAN 1
admit to [facility]   correct sodium slowly   f/u urine lytes  follow-up BMP in AM admit to [facility]   correct sodium slowly- IVF-NS   follow-up BMP at 2400  f/u urine lytes, thyroid panel  [facility] acute vs acute on chronic  per records, was on Celexa and stopped, transitioned to effexor. attribute to celexa?  admit to [facility]   correct sodium slowly, as not to exceed 4-/24Hr  - IVF-NS contemplate hypertonic  follow-up BMP at 2400  f/u urine lytes, thyroid panel  [facility]

## 2017-04-24 NOTE — ED PROVIDER NOTE - OBJECTIVE STATEMENT
75 yo M p/w sent from [facility] for Na 119. pt reportedly with some slight confusion earlier, none now. Pt states has been eating / drinking normally. no fever/chills. No recent trauma. No abd pain. no n/v/d. No neck / back pain. no dysuria ./ hematuria. No agg/allev factors. No other inj or co.  pt only co mild gen weakness.

## 2017-04-24 NOTE — ED PROVIDER NOTE - CHPI ED SYMPTOMS NEG
no vomiting/no hematuria/no dysuria/no abdominal distension/no diarrhea/no blood in stool/no chills/no nausea/no burning urination/no fever

## 2017-04-24 NOTE — ED ADULT NURSE REASSESSMENT NOTE - NS ED NURSE REASSESS COMMENT FT1
Received patient from Maria Del Rosario Sahu ( RN ) . patient presents alert and oriented to person, place, situation, current year.   patient denies headache, dizziness, blurred vision, chest pain, palpitations.   respirations even and unlabored. IV intact..

## 2017-04-24 NOTE — H&P ADULT - ASSESSMENT
76-year-old male with past medical history of major depression disorder, right inguinal hernia, prostate cancer, CLL, anxiety, status post cataract surgery, presents to ED from [facility] with an abnormal lab value – [id number], admitted with hyponatremia

## 2017-04-24 NOTE — ED PROVIDER NOTE - ENMT, MLM
Airway patent, Nasal mucosa clear. Mouth with normal mucosa. Throat has no vesicles, no oropharyngeal exudates and uvula is midline. MM  Moist. non-toxic, well appearing. Neck supple. no meningeal signs.

## 2017-04-24 NOTE — H&P ADULT - NSHPSOCIALHISTORY_GEN_ALL_CORE
SOCIAL HISTORY:  Smoker: [ ] Yes  [x ] No         ETOH use: [x ] Yes  [ ] No              FREQUENCY / QUANTITY: wine socially  Ilicit Drug use:  [ ] Yes  [x ] No  Occupation:   Live with: alone

## 2017-04-25 LAB
ANION GAP SERPL CALC-SCNC: 9 MMOL/L — SIGNIFICANT CHANGE UP (ref 5–17)
BUN SERPL-MCNC: 10 MG/DL — SIGNIFICANT CHANGE UP (ref 7–23)
BUN SERPL-MCNC: 12 MG/DL — SIGNIFICANT CHANGE UP (ref 7–23)
BUN SERPL-MCNC: 15 MG/DL — SIGNIFICANT CHANGE UP (ref 7–23)
CALCIUM SERPL-MCNC: 8.2 MG/DL — LOW (ref 8.5–10.1)
CALCIUM SERPL-MCNC: 8.3 MG/DL — LOW (ref 8.5–10.1)
CALCIUM SERPL-MCNC: 8.4 MG/DL — LOW (ref 8.5–10.1)
CHLORIDE SERPL-SCNC: 89 MMOL/L — LOW (ref 96–108)
CHLORIDE SERPL-SCNC: 91 MMOL/L — LOW (ref 96–108)
CHLORIDE SERPL-SCNC: 93 MMOL/L — LOW (ref 96–108)
CO2 SERPL-SCNC: 26 MMOL/L — SIGNIFICANT CHANGE UP (ref 22–31)
CO2 SERPL-SCNC: 27 MMOL/L — SIGNIFICANT CHANGE UP (ref 22–31)
CO2 SERPL-SCNC: 28 MMOL/L — SIGNIFICANT CHANGE UP (ref 22–31)
CREAT SERPL-MCNC: 0.45 MG/DL — LOW (ref 0.5–1.3)
CREAT SERPL-MCNC: 0.49 MG/DL — LOW (ref 0.5–1.3)
CREAT SERPL-MCNC: 0.69 MG/DL — SIGNIFICANT CHANGE UP (ref 0.5–1.3)
EOSINOPHIL NFR BLD AUTO: 1 % — SIGNIFICANT CHANGE UP (ref 0–6)
GLUCOSE SERPL-MCNC: 91 MG/DL — SIGNIFICANT CHANGE UP (ref 70–99)
GLUCOSE SERPL-MCNC: 93 MG/DL — SIGNIFICANT CHANGE UP (ref 70–99)
GLUCOSE SERPL-MCNC: 96 MG/DL — SIGNIFICANT CHANGE UP (ref 70–99)
HBA1C BLD-MCNC: 5.4 % — SIGNIFICANT CHANGE UP (ref 4–5.6)
HCT VFR BLD CALC: 36.7 % — LOW (ref 39–50)
HGB BLD-MCNC: 12.8 G/DL — LOW (ref 13–17)
LYMPHOCYTES # BLD AUTO: 75 % — HIGH (ref 13–44)
MAGNESIUM SERPL-MCNC: 2.3 MG/DL — SIGNIFICANT CHANGE UP (ref 1.8–2.4)
MCHC RBC-ENTMCNC: 34.4 PG — HIGH (ref 27–34)
MCHC RBC-ENTMCNC: 34.9 GM/DL — SIGNIFICANT CHANGE UP (ref 32–36)
MCV RBC AUTO: 98.5 FL — SIGNIFICANT CHANGE UP (ref 80–100)
MONOCYTES NFR BLD AUTO: 2 % — SIGNIFICANT CHANGE UP (ref 1–9)
NEUTROPHILS NFR BLD AUTO: 19 % — LOW (ref 43–77)
PHOSPHATE SERPL-MCNC: 3.1 MG/DL — SIGNIFICANT CHANGE UP (ref 2.5–4.5)
PLATELET # BLD AUTO: 231 K/UL — SIGNIFICANT CHANGE UP (ref 150–400)
POTASSIUM SERPL-MCNC: 4.1 MMOL/L — SIGNIFICANT CHANGE UP (ref 3.5–5.3)
POTASSIUM SERPL-MCNC: 4.2 MMOL/L — SIGNIFICANT CHANGE UP (ref 3.5–5.3)
POTASSIUM SERPL-MCNC: 4.3 MMOL/L — SIGNIFICANT CHANGE UP (ref 3.5–5.3)
POTASSIUM SERPL-SCNC: 4.1 MMOL/L — SIGNIFICANT CHANGE UP (ref 3.5–5.3)
POTASSIUM SERPL-SCNC: 4.2 MMOL/L — SIGNIFICANT CHANGE UP (ref 3.5–5.3)
POTASSIUM SERPL-SCNC: 4.3 MMOL/L — SIGNIFICANT CHANGE UP (ref 3.5–5.3)
RBC # BLD: 3.72 M/UL — LOW (ref 4.2–5.8)
RBC # FLD: 10.8 % — SIGNIFICANT CHANGE UP (ref 10.3–14.5)
SODIUM SERPL-SCNC: 125 MMOL/L — LOW (ref 135–145)
SODIUM SERPL-SCNC: 125 MMOL/L — LOW (ref 135–145)
SODIUM SERPL-SCNC: 128 MMOL/L — LOW (ref 135–145)
SODIUM SERPL-SCNC: 128 MMOL/L — LOW (ref 135–145)
T3 SERPL-MCNC: 100 NG/DL — SIGNIFICANT CHANGE UP (ref 80–200)
T4 AB SER-ACNC: 6.5 UG/DL — SIGNIFICANT CHANGE UP (ref 4.6–12)
TSH SERPL-MCNC: 2.74 UIU/ML — SIGNIFICANT CHANGE UP (ref 0.36–3.74)
WBC # BLD: 15.9 K/UL — HIGH (ref 3.8–10.5)
WBC # FLD AUTO: 15.9 K/UL — HIGH (ref 3.8–10.5)

## 2017-04-25 PROCEDURE — 99233 SBSQ HOSP IP/OBS HIGH 50: CPT | Mod: GC

## 2017-04-25 RX ORDER — ALPRAZOLAM 0.25 MG
0 TABLET ORAL
Qty: 0 | Refills: 0 | COMMUNITY

## 2017-04-25 RX ORDER — ESCITALOPRAM OXALATE 10 MG/1
1 TABLET, FILM COATED ORAL
Qty: 0 | Refills: 0 | COMMUNITY

## 2017-04-25 RX ORDER — CITALOPRAM 10 MG/1
1 TABLET, FILM COATED ORAL
Qty: 0 | Refills: 0 | COMMUNITY

## 2017-04-25 RX ADMIN — Medication 100 MILLIGRAM(S): at 15:44

## 2017-04-25 RX ADMIN — ENOXAPARIN SODIUM 40 MILLIGRAM(S): 100 INJECTION SUBCUTANEOUS at 22:45

## 2017-04-25 RX ADMIN — GABAPENTIN 300 MILLIGRAM(S): 400 CAPSULE ORAL at 15:45

## 2017-04-25 RX ADMIN — FINASTERIDE 5 MILLIGRAM(S): 5 TABLET, FILM COATED ORAL at 15:40

## 2017-04-25 RX ADMIN — GABAPENTIN 300 MILLIGRAM(S): 400 CAPSULE ORAL at 21:51

## 2017-04-25 RX ADMIN — Medication 0.5 MILLIGRAM(S): at 22:45

## 2017-04-25 RX ADMIN — GABAPENTIN 300 MILLIGRAM(S): 400 CAPSULE ORAL at 05:09

## 2017-04-25 RX ADMIN — TAMSULOSIN HYDROCHLORIDE 0.4 MILLIGRAM(S): 0.4 CAPSULE ORAL at 21:51

## 2017-04-25 RX ADMIN — Medication 18.75 MILLIGRAM(S): at 08:23

## 2017-04-25 RX ADMIN — Medication 100 MILLIGRAM(S): at 21:49

## 2017-04-25 NOTE — DISCHARGE NOTE ADULT - HOSPITAL COURSE
76-year-old male with past medical history of major depression disorder, right inguinal hernia, prostate cancer, CLL, anxiety, status post cataract surgery, presents to ED from [facility] with an abnormal lab value – [id number]. patient was recently seen and treated at [facility] for major depressive disorder and hyponatremia, was discharged to [facility] for further treatment. patient states that he has been eating and drinking okay, denies any fever or chills any recent illness. has been feeling increased weakness, some slight confusion earlier, not present now.  Patient states that he's been feeling more cold lately. Had recent cateract surgery a few weeks ago.  patient denies fever, chills, chest pain, vision changes, headache, shortness of breath, ALDANA, abdominal pain, nausea, vomiting, diarrhea, dysuria, numbness, tingling. Patient denies recent sick contacts, recent travel.  Denies suicidal ideation/homicidal ideation.  In ED – labs remarkable for – WBC – 19.4, hematocrit – 37.2, sodium – 119, chloride – 84, glucose – 121, CXR – no acute pulmonary process.  patient received 2L NS bolus    Spoke with psychologist, Dr. Garcia, ([phone]) who confirmed pt is not currently on any SSRI as pt is hyponatremic. Pt states he is feeling well today with no complaints. Na was corrected with 2 NS boluses from 119 to 128. Will f/u Na in afternoon and consult nephro, Dr. Burnham.      Hyponatremia is acute vs acute on chronic. Per psych Dr. Garcia at [facility] - pt was on Celexa and stopped, transitioned to effexor x1 dose which was stopped 2/2 to hyponatremia. Urine lytes************ thyroid panel***********  - f/u with Renal Consult-Chacha*******  - uric acid 2.3 - will f/u am uric acid.      Depression was controlled during hospital course. Pt was evaluated by Dr Portillo****************. Anxiety was controlled with ativan PRN.  Chronic leukemia is stable, not currently in treatment. Pt follows up with heme Onc, Dr Estrada. Prostate cancer was stable, not currently in treatment.    More than 30 min was spent on this note. 76-year-old male with past medical history of major depression disorder, right inguinal hernia, prostate cancer, CLL, anxiety, status post cataract surgery, presents to ED from [facility] with an abnormal lab value – [id number]. patient was recently seen and treated at [facility] for major depressive disorder and hyponatremia, was discharged to [facility] for further treatment. patient states that he has been eating and drinking okay, denies any fever or chills any recent illness. has been feeling increased weakness, some slight confusion earlier, not present now.  Patient states that he's been feeling more cold lately. Had recent cateract surgery a few weeks ago.  patient denies fever, chills, chest pain, vision changes, headache, shortness of breath, ALDANA, abdominal pain, nausea, vomiting, diarrhea, dysuria, numbness, tingling. Patient denies recent sick contacts, recent travel.  Denies suicidal ideation/homicidal ideation.  In ED – labs remarkable for – WBC – 19.4, hematocrit – 37.2, sodium – 119, chloride – 84, glucose – 121, CXR – no acute pulmonary process.  patient received 2L NS bolus    Hyponatremia is acute vs acute on chronic. Per psych Dr. Garcia at [facility] - pt was on Celexa and stopped, transitioned to effexor x1 dose which was stopped 2/2 to hyponatremia. Spoke with PMD Dr Waldo Farias ([phone] or cell [phone]) who stated pt has been altered as per his sister who called him regarding his strange behavior recently. PMD stated all of this is new behavior within the last few weeks. Urine lytes normal- K 23, Na 33, Cl 45. Thyroid panel - WNL (TSH 2.74, T3 100, T4 6.5). Urica acid 2.3 to 2.4 Renal consulted, Dr. Burnham, acute on chronic asymptomatic hyponatremia worsened by increased fluid intake. As sodium has corrected too fast (Na 119 to 128 in 24 hours) no more I.V. fluids were given. or limit oral fluids intake for now. Once Na was stable over the next 24 hours will place on 32 Oz fluid restriction*****************. Avoided SSRI and NSAIDS.     Call was placed to Dr. Milner ([phone]), ophthalmologist regarding pts cataract surgery and left eye complaints.*************    Depression was controlled during hospital course. SSRIs held due to hyponatremia. Pt was evaluated by Dr Portillo****************. Anxiety was controlled with ativan PRN. Chronic leukemia is stable, not currently in treatment. Pt follows up with heme Onc, Dr Estrada. Prostate cancer was stable, not currently in treatment.    Pt is medically stable for discharge.    More than 30 min was spent on this note. complaintsd male with past medical history of major depression disorder, right inguinal hernia, prostate cancer, CLL, anxiety, status post cataract surgery, presented to ED from [facility] with an abnormal lab value – [id number]. Patient was recently seen and treated at [facility] for major depressive disorder and hyponatremia, was discharged to [facility] for further treatment. Patient stated that he has been feeling increased weakness, some slight confusion earlier, which resolved.  Patient states that he's been feeling more cold lately. Denies suicidal ideation/homicidal ideation.  In ED – labs remarkable for – WBC – 19.4, hematocrit – 37.2, sodium – 119, chloride – 84, glucose – 121, CXR – no acute pulmonary process. Patient received 2L NS bolus in ED.    Hyponatremia is acute vs acute on chronic. Per psych Dr. Garcia at [facility] - pt was on Celexa and stopped, transitioned to effexor x1 dose which was stopped 2/2 to hyponatremia. Spoke with PMD Dr Waldo Farias ([phone] or cell [phone]) who stated pt has been altered as per his sister who called him regarding his strange behavior recently. PMD stated all of this is new behavior within the last few weeks. Urine lytes normal- K 23, Na 33, Cl 45. Thyroid panel - WNL (TSH 2.74, T3 100, T4 6.5). Uric acid 2.3 to 2.4 Renal consulted, Dr. Burnham, acute on chronic asymptomatic hyponatremia worsened by increased fluid intake. As sodium has corrected too fast (Na 119 to 128 in 24 hours) no more I.V. fluids were given. or limit oral fluids intake for now. Once Na was stable over the next 24 hours will place on 32 Oz fluid restriction.  Avoided SSRI and NSAIDS. Pt was spoken to regarding not drinking too much water - limited to 1000ml a day. Was called by nurse after speaking to pt who was confused saying he needs to drink as much as possible. Pt was re counseled to not drink too much, pt is possibly drinking excess water despite being told not to. Pt now understands to drink less. Na currently court to 131 after fluid restriction. Renal again consulted, Dr. Burnham - Improving sodium levels. Suggested PO fluid restriction and avoiding hypotonic fluids.     Depression was controlled during hospital course. SSRIs held due to hyponatremia. Pt was evaluated by Dr Portillo who recommended pt to go back to [facility] as per pts wishes to be treated for anxiety and depression. Anxiety was controlled with ativan PRN. Chronic leukemia is stable, not currently in treatment. Pt follows up with heme Onc, Dr Estrada. Prostate cancer was stable, not currently in treatment.    Pt is medically stable for discharge. See EMR for todays progress note.    More than 30 min was spent on this note. 75 y/o male with past medical history of major depression disorder, right inguinal hernia, prostate cancer, CLL, anxiety, status post cataract surgery, presented to ED from [facility] with an abnormal lab value – [id number]. Patient was recently seen and treated at [facility] for major depressive disorder and hyponatremia, was discharged to [facility] for further treatment. Patient stated that he has been feeling increased weakness, some slight confusion earlier, which resolved.  Patient states that he's been feeling more cold lately. Denies suicidal ideation/homicidal ideation.  In ED – labs remarkable for – WBC – 19.4, hematocrit – 37.2, sodium – 119, chloride – 84, glucose – 121, CXR – no acute pulmonary process. Patient received 2L NS bolus in ED.     Hyponatremia is acute vs acute on chronic. Per psych Dr. Garcia at [facility] - pt was on Celexa and stopped, transitioned to effexor x1 dose which was stopped 2/2 to hyponatremia. Spoke with PMD Dr Waldo Farias ([phone] or cell [phone]) who stated pt has been altered as per his sister who called him regarding his strange behavior recently. PMD stated all of this is new behavior within the last few weeks. Urine lytes normal- K 23, Na 33, Cl 45. Thyroid panel - WNL (TSH 2.74, T3 100, T4 6.5). Uric acid 2.3 to 2.4 Renal consulted, Dr. Burnham, acute on chronic asymptomatic hyponatremia worsened by increased fluid intake. As sodium has corrected too fast (Na 119 to 128 in 24 hours) no more I.V. fluids were given. or limit oral fluids intake for now. Once Na was stable over the next 24 hours will place on 32 Oz fluid restriction.  Avoided SSRI and NSAIDS. Pt was spoken to regarding not drinking too much water - limited to 1000ml a day. Was called by nurse after speaking to pt who was confused saying he needs to drink as much as possible. Pt was re counseled to not drink too much, pt is possibly drinking excess water despite being told not to. Pt now understands to drink less. Na currently court to 131 after fluid restriction. Renal again consulted, Dr. Burnham - Improving sodium levels. Suggested PO fluid restriction and avoiding hypotonic fluids.     Depression was controlled during hospital course. SSRIs held due to hyponatremia. Pt was evaluated by Dr Portillo who recommended pt to go back to [facility] as per pts wishes to be treated for anxiety and depression. Anxiety was controlled with ativan PRN. Chronic leukemia is stable, not currently in treatment. Pt follows up with heme Onc, Dr Estrada. Prostate cancer was stable, not currently in treatment.    Pt is medically stable for discharge. See EMR for todays progress note.    More than 30 min was spent on this note. 75 y/o male with past medical history of major depression disorder, right inguinal hernia, prostate cancer, CLL, anxiety, status post cataract surgery, presented to ED from [facility] with an abnormal lab value – [id number]. Patient was recently seen and treated at [facility] for major depressive disorder and hyponatremia, was discharged to [facility] for further treatment. Patient stated that he has been feeling increased weakness, some slight confusion earlier, which resolved.  Patient states that he's been feeling more cold lately. Denies suicidal ideation/homicidal ideation.  In ED – labs remarkable for – WBC – 19.4, hematocrit – 37.2, sodium – 119, chloride – 84, glucose – 121, CXR – no acute pulmonary process. Patient received 2L NS bolus in ED.     Hyponatremia is acute vs acute on chronic likely multifactorial - SSRI use and excess fluid intake. Per psych Dr. Garcia at [facility] - pt was on Celexa and stopped, transitioned to effexor x1 dose which was stopped 2/2 to hyponatremia. Spoke with PMD Dr Waldo Farias ([phone] or cell [phone]) who stated pt has been altered as per his sister who called him regarding his strange behavior recently. PMD stated all of this is new behavior within the last few weeks. Urine lytes normal- K 23, Na 33, Cl 45. Thyroid panel - WNL (TSH 2.74, T3 100, T4 6.5). Uric acid 2.3 to 2.4 Renal consulted, Dr. Burnham, acute on chronic asymptomatic hyponatremia worsened by increased fluid intake. As sodium has corrected too fast (Na 119 to 128 in 24 hours) no more I.V. fluids were given. or limit oral fluids intake for now. Once Na was stable over the next 24 hours will place on 32 Oz fluid restriction.  Avoided SSRI and NSAIDS. Pt was spoken to regarding not drinking too much water - limited to 1000ml a day. Was called by nurse after speaking to pt who was confused saying he needs to drink as much as possible. Pt was re counseled to not drink too much, pt is possibly drinking excess water despite being told not to. Pt now understands to drink less. Na currently court to 131 after fluid restriction. Renal again consulted, Dr. Burnham - Improving sodium levels. Suggested PO fluid restriction and avoiding hypotonic fluids.     Depression was controlled during hospital course. SSRIs held due to hyponatremia. Pt was evaluated by Dr Portillo who recommended pt to go back to [facility] as per pts wishes to be treated for anxiety and depression. Anxiety was controlled with ativan PRN. Chronic leukemia is stable, not currently in treatment. Pt follows up with heme Onc, Dr Estrada. Prostate cancer was stable, not currently in treatment.    Pt is medically stable for discharge. See EMR for todays progress note.    More than 30 min was spent on this note. 77 y/o male with past medical history of major depression disorder, right inguinal hernia, prostate cancer, CLL, anxiety, status post cataract surgery, presented to ED from [facility] with an abnormal lab value – [id number]. Patient was recently seen and treated at [facility] for major depressive disorder and hyponatremia, was discharged to [facility] for further treatment. Patient stated that he has been feeling increased weakness, some slight confusion earlier, which resolved.  Patient states that he's been feeling more cold lately. Denies suicidal ideation/homicidal ideation.  In ED – labs remarkable for – WBC – 19.4, hematocrit – 37.2, sodium – 119, chloride – 84, glucose – 121, CXR – no acute pulmonary process. Patient received 2L NS bolus in ED.     Hyponatremia is acute vs acute on chronic likely multifactorial - SSRI use and excess fluid intake. Per psych Dr. Garcia at [facility] - pt was on Celexa and stopped, transitioned to effexor x1 dose which was stopped 2/2 to hyponatremia. Spoke with PMD Dr Waldo Farias ([phone] or cell [phone]) who stated pt has been altered as per his sister who called him regarding his strange behavior recently. PMD stated all of this is new behavior within the last few weeks. Urine lytes normal- K 23, Na 33, Cl 45. Thyroid panel - WNL (TSH 2.74, T3 100, T4 6.5). Uric acid 2.3 to 2.4 Renal consulted, Dr. Burnham, acute on chronic asymptomatic hyponatremia worsened by increased fluid intake. As sodium has corrected too fast (Na 119 to 128 in 24 hours) no more I.V. fluids were given. or limit oral fluids intake for now. Once Na was stable over the next 24 hours will place on 32 Oz fluid restriction.  Avoided SSRI and NSAIDS. Pt was spoken to regarding not drinking too much water - limited to 1000ml a day. Was called by nurse after speaking to pt who was confused saying he needs to drink as much as possible. Pt was re counseled to not drink too much, pt is possibly drinking excess water despite being told not to. Pt now understands to drink less. Na currently court to 131 after fluid restriction. Renal again consulted, Dr. Burnham - Improving sodium levels. Suggested PO fluid restriction and avoiding hypotonic fluids. Pt had leukocytosisDepression, WBC of 17.4, likely reactive procalcitonin .05, no fevers no signs of infection, blood and urine cultures negative.    Depression was controlled during hospital course. SSRIs held due to hyponatremia. Pt was evaluated by Dr Portillo who recommended pt to go back to [facility] as per pts wishes to be treated for anxiety and depression. Anxiety was controlled with ativan PRN. Chronic leukemia is stable, not currently in treatment. Pt follows up with heme Onc, Dr Estrada. Prostate cancer was stable, not currently in treatment.    Pt is medically stable for discharge. See EMR for todays progress note.    More than 30 min was spent on this note. 75 y/o male with past medical history of major depression disorder, right inguinal hernia, prostate cancer, CLL, anxiety, status post cataract surgery, presented to ED from [facility] with an abnormal lab value – [id number]. Patient was recently seen and treated at [facility] for major depressive disorder and hyponatremia, was discharged to [facility] for further treatment. Patient stated that he has been feeling increased weakness, some slight confusion earlier, which resolved.  Patient states that he's been feeling more cold lately. Denies suicidal ideation/homicidal ideation.  In ED – labs remarkable for – WBC – 19.4, hematocrit – 37.2, sodium – 119, chloride – 84, glucose – 121, CXR – no acute pulmonary process. Patient received 2L NS bolus in ED.     Hyponatremia is acute vs acute on chronic likely multifactorial - SSRI use and excess fluid intake. Per psych Dr. Garcia at [facility] - pt was on Celexa and stopped, transitioned to effexor x1 dose which was stopped 2/2 to hyponatremia. Spoke with PMD Dr Waldo Farias ([phone] or cell [phone]) who stated pt has been altered as per his sister who called him regarding his strange behavior recently. PMD stated all of this is new behavior within the last few weeks. Urine lytes normal- K 23, Na 33, Cl 45. Thyroid panel - WNL (TSH 2.74, T3 100, T4 6.5). Uric acid 2.3 to 2.4. Renal was consulted, Dr. Burnham, acute on chronic asymptomatic hyponatremia worsened by increased fluid intake. As sodium has corrected too fast (Na 119 to 128 in 24 hours) no more I.V. fluids were given. or limit oral fluids intake for now. Once Na was stable over the next 24 hours will place on 32 Oz fluid restriction.  Avoided SSRI and NSAIDS. Pt was spoken to regarding not drinking too much water - limited to 1000ml a day. Was called by nurse after speaking to pt who was confused saying he needs to drink as much as possible. Pt was re counseled to not drink too much, pt is possibly drinking excess water despite being told not to. Pt now understands to drink less. Na currently court to 131 after fluid restriction. Renal again consulted, Dr. Burnham - Improving sodium levels. Suggested PO fluid restriction and avoiding hypotonic fluids. Pt had leukocytosisDepression, WBC of 17.4, likely reactive procalcitonin .05, no fevers no signs of infection, blood and urine cultures negative.    Depression was controlled during hospital course. SSRIs held due to hyponatremia. Pt was evaluated by Dr Portillo who recommended pt to go back to [facility] as per pts wishes to be treated for anxiety and depression. Anxiety was controlled with ativan PRN. Chronic leukemia is stable, not currently in treatment. Pt follows up with heme Onc, Dr Estrada. Prostate cancer was stable, not currently in treatment.    Pt is medically stable for discharge. See EMR for todays progress note.    More than 30 min was spent on this note. 75 y/o male with past medical history of major depression disorder, right inguinal hernia, prostate cancer, CLL, anxiety, status post cataract surgery, presented to ED from [facility] with an abnormal lab value – [id number]. Patient was recently seen and treated at [facility] for major depressive disorder and hyponatremia, was discharged to [facility] for further treatment. Patient stated that he has been feeling increased weakness, some slight confusion earlier, which resolved.  Patient states that he's been feeling more cold lately. Denies suicidal ideation/homicidal ideation.  In ED – labs remarkable for – WBC – 19.4, hematocrit – 37.2, sodium – 119, chloride – 84, glucose – 121, CXR – no acute pulmonary process. Patient received 2L NS bolus in ED.     Hyponatremia is acute vs acute on chronic likely multifactorial - SSRI use and excess fluid intake. Per psych Dr. Garcia at [facility] - pt was on Celexa and stopped, transitioned to effexor x1 dose which was stopped 2/2 to hyponatremia. Spoke with PMD Dr Waldo Farias ([phone] or cell [phone]) who stated pt has been altered as per his sister who called him regarding his strange behavior recently. PMD stated all of this is new behavior within the last few weeks. Urine lytes normal- K 23, Na 33, Cl 45. Thyroid panel - WNL (TSH 2.74, T3 100, T4 6.5). Uric acid 2.3 to 2.4. Renal was consulted, Dr. Burnham, acute on chronic asymptomatic hyponatremia worsened by increased fluid intake. As sodium has corrected too fast (Na 119 to 128 in 24 hours) no more I.V. fluids were given. or limit oral fluids intake for now. Once Na was stable over the next 24 hours will place on 32 Oz fluid restriction.  Avoided SSRI and NSAIDS. Pt was spoken to regarding not drinking too much water - limited to 1000ml a day. Was called by nurse after speaking to pt who was confused saying he needs to drink as much as possible. Pt was re counseled to not drink too much, pt is possibly drinking excess water despite being told not to. Pt now understands to drink less. Na currently court to 131 after fluid restriction. Renal again consulted, Dr. Burnham - Improving sodium levels. Suggested PO fluid restriction and avoiding hypotonic fluids. Pt had leukocytosisDepression, WBC of 17.4, likely reactive procalcitonin .05, no fevers no signs of infection, blood and urine cultures negative.    Pt has stable inguinal hernia. Pt expressed he was scared to walk without having support for it so he was given a scrotal support to wear. Depression was controlled during hospital course. SSRIs held due to hyponatremia. Pt was evaluated by Dr Portillo who recommended pt to go back to [facility] as per pts wishes to be treated for anxiety and depression. Anxiety was controlled with ativan PRN. Chronic leukemia is stable, not currently in treatment. Pt follows up with heme Onc, Dr Estrada. Prostate cancer was stable, not currently in treatment.    Pt is medically stable for discharge. See EMR for todays progress note.    More than 30 min was spent on this note. 75 y/o male with past medical history of major depression disorder, right inguinal hernia, prostate cancer, CLL, anxiety, status post cataract surgery, presented to ED from [facility] with an abnormal lab value – [id number]. Patient was recently seen and treated at [facility] for major depressive disorder and hyponatremia, was discharged to [facility] for further treatment. Patient stated that he has been feeling increased weakness, some slight confusion earlier, which resolved.  Patient states that he's been feeling more cold lately. Denies suicidal ideation/homicidal ideation.  In ED – labs remarkable for – WBC – 19.4, hematocrit – 37.2, sodium – 119, chloride – 84, glucose – 121, CXR – no acute pulmonary process. Patient received 2L NS bolus in ED.     Hyponatremia is acute vs acute on chronic likely multifactorial - SSRI use and excess fluid intake. Per psych Dr. Garcia at [facility] - pt was on Celexa and stopped, transitioned to effexor x1 dose which was stopped 2/2 to hyponatremia. Spoke with PMD Dr Waldo Farias ([phone] or cell [phone]) who stated pt has been altered as per his sister who called him regarding his strange behavior recently. PMD stated all of this is new behavior within the last few weeks. Urine lytes normal- K 23, Na 33, Cl 45. Thyroid panel - WNL (TSH 2.74, T3 100, T4 6.5). Uric acid 2.3 to 2.4. Renal was consulted, Dr. Burnham, acute on chronic asymptomatic hyponatremia worsened by increased fluid intake. As sodium has corrected too fast (Na 119 to 128 in 24 hours) no more I.V. fluids were given. Sodium was monitored. Once Na was stable over the next 24 hours will place on 32 Oz fluid restriction.  Avoided SSRI and NSAIDS. Pt was spoken to regarding not drinking too much water - limited to 1000ml a day. Was called by nurse after speaking to pt who was confused saying he needs to drink as much as possible. Pt was re counseled to not drink too much, pt is possibly drinking excess water despite being told not to. Pt now understands to drink less. Na currently court to 131 after fluid restriction. Renal again consulted, Dr. Burnham - Improving sodium levels. Suggested PO fluid restriction and avoiding hypotonic fluids. Pt had leukocytosisDepression, WBC of 17.4, likely reactive procalcitonin .05, no fevers no signs of infection, blood and urine cultures negative.    Pt has stable inguinal hernia. Pt expressed he was scared to walk without having support for it so he was given a scrotal support to wear. Depression was controlled during hospital course. SSRIs held due to hyponatremia. Pt was evaluated by Dr Portillo who recommended pt to go back to [facility] as per pts wishes to be treated for anxiety and depression. Anxiety was controlled with ativan PRN. Leukemia is stable, not currently in treatment. Pt follows up with heme Onc, Dr Estrada. Prostate cancer was stable, not currently in treatment.    Dr. Farias's office notified, will fax d/c note to him.     Pt is medically stable for discharge. See EMR for todays progress note.

## 2017-04-25 NOTE — DISCHARGE NOTE ADULT - PLAN OF CARE
stable Spoke with supervisor at Dr. Milner's office ([phone]), ophthalmologist, who stated pt had left cataract surgery on March 20th 2017 and had no complications. Pt went back on March 28th for a follow up and everything looked fine and pt had no complaints at that time. Pt needed to be on prednisolone drops for 3 weeks and now no longer needs eye drops. They recommend pt to follow up there after discharge for any complaints. stable  follow reccs of [facility] stable  follow reccs of [facility] PO fluid restriction to 32 ounces a day  f/u CBC and BMP in 3 days. Avoid hypotonic fluids.   follow reccs from [facility] PO fluid restriction to 32 ounces a day  Stop all SSRI's  f/u CBC and BMP in 3 days. Avoid hypotonic fluids.   follow reccs from [facility] PO fluid restriction to 32 ounces a day  Stop all SSRI's  f/u CBC and BMP in 3 days. Avoid hypotonic fluids.   f/u with PMD Dr. Farias after [facility] treatment  f/u with Dr. Milner's office regarding cataracts continue current medications for prostate enlargement. PO fluid restriction to 32 ounces a day  Stop all SSRI's  f/u CBC and BMP in 3 days.   follow reccs from [facility]

## 2017-04-25 NOTE — PROGRESS NOTE ADULT - PROBLEM SELECTOR PLAN 1
acute vs acute on chronic  per records, was on Celexa and stopped, transitioned to effexor. attribute to celexa?  admit to [facility]   correct sodium slowly, as not to exceed 4-/24Hr  - IVF-NS contemplate hypertonic  follow-up BMP at 2400  f/u urine lytes, thyroid panel  Renal Consult-[facility] - acute vs acute on chronic  - per psych Dr. Garcia at [facility] - pt was on Celexa and stopped, transitioned to effexor x1 dose which was stopped 2/2 to hyponatremia  - f/u urine lytes, thyroid panel  - f/u with Renal Consult-Chacha  - uric acid 2.3 - will f/u am uric acid

## 2017-04-25 NOTE — DISCHARGE NOTE ADULT - CARE PROVIDER_API CALL
Don Burnham), Medicine  [address] [facility] [address] [place] [address]  Phone: [phone]  Fax: [phone]    Samreen Portillo), Psychiatry  [address] [place] [address]  Phone: [phone]  Fax: [phone] Don Brunham), Medicine  [address] [facility] [address] [place] [address]  Phone: [phone]  Fax: [phone]    Samreen Portillo), Psychiatry  [address] [place] [address]  Phone: [phone]  Fax: [phone]    Ulices Farias), Critical Care Medicine; Internal Medicine; Pulmonary Disease  [address] [place] [address]  Phone: [phone]  Fax: [phone]

## 2017-04-25 NOTE — PROGRESS NOTE ADULT - SUBJECTIVE AND OBJECTIVE BOX
HPI:  76-year-old male with past medical history of major depression disorder, right inguinal hernia, prostate cancer, CLL, anxiety, status post cataract surgery, presented to ED from [facility] with an abnormal lab value – [id number]. Pt was recently seen and treated at [facility] for major depressive disorder and hyponatremia, was discharged to [facility] for further treatment. Pt has been feeling increased weakness, some slight confusion earlier, which resolved.  Patient stateed that he's been feeling more cold lately. Denies suicidal ideation/homicidal ideation.  Spoke with psychologist, Dr. Garcia, ([phone]) who confirmed pt is not currently on any SSRI as pt is hyponatremic. Pt states he is feeling well today with no complaints. Na was corrected with 2 NS boluses from 119 to 128. Will f/u Na in afternoon and consult nephro, Dr. Burnham.    REVIEW OF SYSTEMS:    CONSTITUTIONAL: No weakness, fevers or chills  EYES/ENT: No visual changes, no throat pain   RESPIRATORY: No cough, wheezing, hemoptysis; No shortness of breath  CARDIOVASCULAR: No chest pain or palpitations  GASTROINTESTINAL: No abdominal, nausea, vomiting, or hematemesis; No diarrhea or constipation. No melena or hematochezia.  GENITOURINARY: No dysuria, frequency or hematuria  NEUROLOGICAL: No dizziness, numbness, or weakness  SKIN: No itching, burning, rashes, or lesions   All other review of systems is negative unless indicated above.    Vital Signs Last 24 Hrs  T(C): 36.7, Max: 36.7 (04-25 @ 05:40)  T(F): 98, Max: 98 (04-25 @ 05:40)  HR: 70 (70 - 70)  BP: 110/67 (110/67 - 110/67)  BP(mean): --  RR: 16 (16 - 16)  SpO2: 96% (96% - 96%)  Wt(kg): --    PHYSICAL EXAM:     GENERAL: no acute distress  HEENT: NC/AT, EOMI, neck supple, MMM  RESPIRATORY: LCTAB/L, no rhonchi, rales, or wheezing  CARDIOVASCULAR: RRR, no murmurs, gallops, rubs  ABDOMINAL: soft, non-tender, non-distended, positive bowel sounds   EXTREMITIES: no clubbing, cyanosis, or edema  NEUROLOGICAL: alert and oriented x 3, non-focal  SKIN: no rashes or lesions   MUSCULOSKELETAL: no gross joint deformity    LABS:                        12.8   15.9  )-----------( 231      ( 2017 08:19 )             36.7     04-25    128<L>  |  93<L>  |  10  ----------------------------<  93  4.1   |  26  |  0.45<L>    Ca    8.4<L>      2017 08:19  Phos  3.1     -  Mg     2.3     -    TPro  6.3  /  Alb  3.6  /  TBili  0.5  /  DBili  x   /  AST  33  /  ALT  39  /  AlkPhos  48      PT/INR - ( 2017 18:26 )   PT: 10.5 sec;   INR: 0.96 ratio         PTT - ( 2017 18:26 )  PTT:28.7 sec  Urinalysis Basic - ( 2017 20:05 )    Color: Yellow / Appearance: Clear / S.020 / pH: x  Gluc: x / Ketone: Negative  / Bili: Negative / Urobili: Negative   Blood: x / Protein: 25 mg/dL / Nitrite: Negative   Leuk Esterase: Negative / RBC: 0-2 /HPF / WBC 0-2   Sq Epi: x / Non Sq Epi: Occasional / Bacteria: x      CAPILLARY BLOOD GLUCOSE      MEDICATIONS  (STANDING):  finasteride 5milliGRAM(s) Oral daily  tamsulosin 0.4milliGRAM(s) Oral at bedtime  gabapentin 300milliGRAM(s) Oral three times a day  enoxaparin Injectable 40milliGRAM(s) SubCutaneous every 24 hours  docusate sodium 100milliGRAM(s) Oral three times a day      Radiology: HPI:  76-year-old male with past medical history of major depression disorder, right inguinal hernia, prostate cancer, CLL, anxiety, status post cataract surgery, presented to ED from [facility] with an abnormal lab value – [id number]. Pt was recently seen and treated at [facility] for major depressive disorder and hyponatremia, was discharged to [facility] for further treatment. Pt has been feeling increased weakness, some slight confusion earlier, which resolved.  Patient stateed that he's been feeling more cold lately. Denies suicidal ideation/homicidal ideation.  Spoke with psychologist, Dr. Garcia, ([phone]) who confirmed pt is not currently on any SSRI as pt is hyponatremic. Pt states he is feeling well today with no complaints. Na was corrected with 2 NS boluses from 119 to 128. Will f/u Na in afternoon and consult nephro, Dr. Burnham.    REVIEW OF SYSTEMS:    CONSTITUTIONAL: No weakness, fevers or chills  EYES/ENT: No visual changes, no throat pain   RESPIRATORY: No cough, wheezing, hemoptysis; No shortness of breath  CARDIOVASCULAR: No chest pain or palpitations  GASTROINTESTINAL: No abdominal, nausea, vomiting, or hematemesis; No diarrhea or constipation. No melena or hematochezia.  GENITOURINARY: No dysuria, frequency or hematuria  NEUROLOGICAL: No dizziness, numbness, or weakness  SKIN: No itching, burning, rashes, or lesions   All other review of systems is negative unless indicated above.    Vital Signs Last 24 Hrs  T(C): 36.7, Max: 36.7 (04-25 @ 05:40)  T(F): 98, Max: 98 (04-25 @ 05:40)  HR: 70 (70 - 70)  BP: 110/67 (110/67 - 110/67)  BP(mean): --  RR: 16 (16 - 16)  SpO2: 96% (96% - 96%)  Wt(kg): --    PHYSICAL EXAM:     GENERAL: no acute distress  HEENT: NC/AT, EOMI, neck supple, MMM  RESPIRATORY: LCTAB/L, no rhonchi, rales, or wheezing  CARDIOVASCULAR: RRR, no murmurs, gallops, rubs  ABDOMINAL: soft, non-tender, non-distended, positive bowel sounds   EXTREMITIES: no clubbing, cyanosis, or edema  NEUROLOGICAL: alert and oriented x 3, non-focal  SKIN: no rashes or lesions   MUSCULOSKELETAL: no gross joint deformity    LABS:                        12.8   15.9  )-----------( 231      ( 2017 08:19 )             36.7     04-25    128<L>  |  93<L>  |  10  ----------------------------<  93  4.1   |  26  |  0.45<L>    Ca    8.4<L>      2017 08:19  Phos  3.1     -  Mg     2.3     -    TPro  6.3  /  Alb  3.6  /  TBili  0.5  /  DBili  x   /  AST  33  /  ALT  39  /  AlkPhos  48      PT/INR - ( 2017 18:26 )   PT: 10.5 sec;   INR: 0.96 ratio         PTT - ( 2017 18:26 )  PTT:28.7 sec  Urinalysis Basic - ( 2017 20:05 )    Color: Yellow / Appearance: Clear / S.020 / pH: x  Gluc: x / Ketone: Negative  / Bili: Negative / Urobili: Negative   Blood: x / Protein: 25 mg/dL / Nitrite: Negative   Leuk Esterase: Negative / RBC: 0-2 /HPF / WBC 0-2   Sq Epi: x / Non Sq Epi: Occasional / Bacteria: x      CAPILLARY BLOOD GLUCOSE      MEDICATIONS  (STANDING):  finasteride 5milliGRAM(s) Oral daily  tamsulosin 0.4milliGRAM(s) Oral at bedtime  gabapentin 300milliGRAM(s) Oral three times a day  enoxaparin Injectable 40milliGRAM(s) SubCutaneous every 24 hours  docusate sodium 100milliGRAM(s) Oral three times a day

## 2017-04-25 NOTE — CONSULT NOTE ADULT - ASSESSMENT
Acute on chronic asymptomatic hyponatremia worsened by increased fluid intake. As sodium has corrected too fast would not give any I.V. fluids or limit oral fluids intake for now. Will monitor Sodium trend. Once stability is confirm over the next 24 hours will place on 32 Oz fluid restriction. Avoid SSRI and NSAIDS.  The patient was educated, all questions answered.  Will follow.  Thank you!

## 2017-04-25 NOTE — CHART NOTE - NSCHARTNOTEFT_GEN_A_CORE
Hospitalist Service Progress Note      S:Fu after recheck Na. Current 125, patient asleep, rousable and awake alert X 3 deneis headache diziness NV.       O: AF P 76 R 14 BP-      Exam:  General:NAD   HEENT:PERRL mmm no nodes trach ML  Neuro CN intact grossly cereb intact fing to nose no pro drift reflexes +2 symmetric bicep. patellar bilat   Cor:RRR  Pulm:CTAB   ABD/GI:SNT +BS             Assesment & Plan  1. Hyponatremia. fluid resuscitated in ED 2 L normal saline. improved from 119 to 125. stable exam, no further correction. FU AM labs, Nephrology consult. Hold AM salt tab.

## 2017-04-25 NOTE — DISCHARGE NOTE ADULT - PATIENT PORTAL LINK FT
“You can access the [facility] Patient Portal, offered by [facility], by registering with the following website: [email or web address]”

## 2017-04-25 NOTE — DISCHARGE NOTE ADULT - PROVIDER TOKENS
TOKMARIELA:'[id number]',TOKMARIELA:'[id number]' TOKEN:'[id number]:'[id number]:'[id number]'

## 2017-04-25 NOTE — PROGRESS NOTE ADULT - ASSESSMENT
76-year-old male with past medical history of major depression disorder, right inguinal hernia, prostate cancer, CLL, anxiety, status post cataract surgery, presents to ED from [facility] with an abnormal lab value – [id number], admitted with hyponatremia

## 2017-04-25 NOTE — PROGRESS NOTE ADULT - ATTENDING COMMENTS
Patient seen and examined, agree with Hx and plan outlined above. Patient A/O X 3, voiced understanding and agreement to aformentioned plan. Sodium improving rapidly, continue to monitor off fluids and off salt tabs. Monitor of SSRI. Stable and asymptomatic at present. Discussed with renal.

## 2017-04-25 NOTE — CHART NOTE - NSCHARTNOTEFT_GEN_A_CORE
Called by RN to evaluate patient for blurry vision.   Patient c/o blurry vision in his R eye with focusing on objects far away and blurry vision in L eye focusing up close. Patient has h/o B/L cataracts and h/o cataract surgery on L eye. Stated he had difficulty making out the time on the clock across the room that he just noticed today. On exam, CN 2-12 grossly intact, PERRL, EOMI, peripheral fields intact B/L, patient was able to read name tag from ~18 inches away. B/L sclera mild erythema/irritation, minimal crusting under B/L eyes. Denies eye pain, pain with ocular movement, headache, discharge, pruritis. Denies loss of vision or sensation of shade being pulled over his eye.    Patient also concerned about medication for agitation/anxiety and requested that his outpatient psychiatrist, Dr. Josue Patel be contacted at [phone]. Patient advised that he has ativan ordered PRN for anxiety and agitation.  Patient left laying comfortably in bed, NAD. Will follow as needed. Called by RN to evaluate patient for blurry vision.   Patient c/o blurry vision in his R eye with focusing on objects far away and blurry vision in L eye focusing up close. Patient has h/o B/L cataracts and h/o cataract surgery on L eye. Stated he had difficulty making out the time on the clock across the room that he just noticed today. On exam, CN 2-12 grossly intact, PERRL, EOMI, peripheral fields intact B/L, patient was able to read name tag from ~18 inches away. B/L sclera mild erythema/irritation, minimal crusting under B/L eyes. Denies eye pain, pain with ocular movement, headache, discharge, pruritis. Denies loss of vision or sensation of shade being pulled over his eye.    Patient also concerned about medication for agitation/anxiety and requested that his outpatient psychiatrist, Dr. Josue Patel be contacted at [phone]. Patient advised that he has ativan ordered PRN for anxiety and agitation.  Patient also c/o burning with urination. UA ordered.  Patient left laying comfortably in bed, NAD. Will follow as needed.

## 2017-04-25 NOTE — PROGRESS NOTE ADULT - PROBLEM SELECTOR PLAN 3
continue with ativan PRN  continue with gabapentin, Effexor continue with ativan PRN  continue with gabapentin,

## 2017-04-25 NOTE — PROGRESS NOTE ADULT - PROBLEM SELECTOR PLAN 2
continue with gabapentin, Effexor  consider psych consult. continue with gabapentin, Effexor d/c 2/2 hyponatremia  consider psych consult.

## 2017-04-25 NOTE — DISCHARGE NOTE ADULT - CARE PLAN
Principal Discharge DX:	Hyponatremia  Goal:	stable  Instructions for follow-up, activity and diet:	PO fluid restriction to 32 ounces a day  f/u CBC and BMP in 3 days. Avoid hypotonic fluids.   follow reccs from [facility]  Secondary Diagnosis:	Anxiety  Instructions for follow-up, activity and diet:	stable  follow [facility]  Secondary Diagnosis:	Cataract  Goal:	stable  Instructions for follow-up, activity and diet:	Spoke with supervisor at Dr. Milner's office ([phone]), ophthalmologist, who stated pt had left cataract surgery on March 20th 2017 and had no complications. Pt went back on March 28th for a follow up and everything looked fine and pt had no complaints at that time. Pt needed to be on prednisolone drops for 3 weeks and now no longer needs eye drops. They recommend pt to follow up there after discharge for any complaints.  Secondary Diagnosis:	CLL (chronic lymphocytic leukemia)  Instructions for follow-up, activity and diet:	stable  Secondary Diagnosis:	Depression  Instructions for follow-up, activity and diet:	stable  follow [facility]  Secondary Diagnosis:	Prostate cancer  Goal:	stable Principal Discharge DX:	Hyponatremia  Goal:	stable  Instructions for follow-up, activity and diet:	PO fluid restriction to 32 ounces a day  f/u CBC and BMP in 3 days. Avoid hypotonic fluids.   follow reccs from [facility]  Secondary Diagnosis:	Anxiety  Instructions for follow-up, activity and diet:	stable  follow [facility]  Secondary Diagnosis:	Cataract  Goal:	stable  Instructions for follow-up, activity and diet:	Spoke with supervisor at Dr. Milner's office ([phone]), ophthalmologist, who stated pt had left cataract surgery on March 20th 2017 and had no complications. Pt went back on March 28th for a follow up and everything looked fine and pt had no complaints at that time. Pt needed to be on prednisolone drops for 3 weeks and now no longer needs eye drops. They recommend pt to follow up there after discharge for any complaints.  Secondary Diagnosis:	CLL (chronic lymphocytic leukemia)  Instructions for follow-up, activity and diet:	stable  Secondary Diagnosis:	Depression  Instructions for follow-up, activity and diet:	stable  follow [facility]  Secondary Diagnosis:	Prostate cancer  Goal:	stable Principal Discharge DX:	Hyponatremia  Goal:	stable  Instructions for follow-up, activity and diet:	PO fluid restriction to 32 ounces a day  Stop all SSRI's  f/u CBC and BMP in 3 days. Avoid hypotonic fluids.   follow reccs from [facility]  Secondary Diagnosis:	Anxiety  Instructions for follow-up, activity and diet:	stable  follow [email or web address]  Secondary Diagnosis:	Cataract  Goal:	stable  Instructions for follow-up, activity and diet:	Spoke with supervisor at Dr. Milner's office ([phone]), ophthalmologist, who stated pt had left cataract surgery on March 20th 2017 and had no complications. Pt went back on March 28th for a follow up and everything looked fine and pt had no complaints at that time. Pt needed to be on prednisolone drops for 3 weeks and now no longer needs eye drops. They recommend pt to follow up there after discharge for any complaints.  Secondary Diagnosis:	CLL (chronic lymphocytic leukemia)  Instructions for follow-up, activity and diet:	stable  Secondary Diagnosis:	Depression  Instructions for follow-up, activity and diet:	stable  follow [email or web address] of [facility]  Secondary Diagnosis:	Prostate cancer  Goal:	stable Principal Discharge DX:	Hyponatremia  Goal:	stable  Instructions for follow-up, activity and diet:	PO fluid restriction to 32 ounces a day  Stop all SSRI's  f/u CBC and BMP in 3 days. Avoid hypotonic fluids.   follow reccs from [facility]  Secondary Diagnosis:	Anxiety  Instructions for follow-up, activity and diet:	stable  follow [facility]  Secondary Diagnosis:	Cataract  Goal:	stable  Instructions for follow-up, activity and diet:	Spoke with supervisor at Dr. Milner's office ([phone]), ophthalmologist, who stated pt had left cataract surgery on March 20th 2017 and had no complications. Pt went back on March 28th for a follow up and everything looked fine and pt had no complaints at that time. Pt needed to be on prednisolone drops for 3 weeks and now no longer needs eye drops. They recommend pt to follow up there after discharge for any complaints.  Secondary Diagnosis:	CLL (chronic lymphocytic leukemia)  Instructions for follow-up, activity and diet:	stable  Secondary Diagnosis:	Depression  Instructions for follow-up, activity and diet:	stable  follow [facility]  Secondary Diagnosis:	Prostate cancer  Goal:	stable Principal Discharge DX:	Hyponatremia  Goal:	stable  Instructions for follow-up, activity and diet:	PO fluid restriction to 32 ounces a day  Stop all SSRI's  f/u CBC and BMP in 3 days. Avoid hypotonic fluids.   follow reccs from [facility]  Secondary Diagnosis:	Anxiety  Instructions for follow-up, activity and diet:	stable  follow rec of [facility]  Secondary Diagnosis:	Cataract  Goal:	stable  Instructions for follow-up, activity and diet:	Spoke with supervisor at Dr. Milner's office ([phone]), ophthalmologist, who stated pt had left cataract surgery on March 20th 2017 and had no complications. Pt went back on March 28th for a follow up and everything looked fine and pt had no complaints at that time. Pt needed to be on prednisolone drops for 3 weeks and now no longer needs eye drops. They recommend pt to follow up there after discharge for any complaints.  Secondary Diagnosis:	CLL (chronic lymphocytic leukemia)  Instructions for follow-up, activity and diet:	stable  Secondary Diagnosis:	Depression  Instructions for follow-up, activity and diet:	stable  follow rec of [facility]  Secondary Diagnosis:	Prostate cancer  Goal:	stable  Instructions for follow-up, activity and diet:	PO fluid restriction to 32 ounces a day  Stop all SSRI's  f/u CBC and BMP in 3 days. Avoid hypotonic fluids.   f/u with PMD Dr. Farias after [facility] treatment  f/u with Dr. Milner's office regarding cataracts Principal Discharge DX:	Hyponatremia  Goal:	stable  Instructions for follow-up, activity and diet:	PO fluid restriction to 32 ounces a day  Stop all SSRI's  f/u CBC and BMP in 3 days. Avoid hypotonic fluids.   follow reccs from [facility]  Secondary Diagnosis:	Anxiety  Instructions for follow-up, activity and diet:	stable  follow rec of [facility]  Secondary Diagnosis:	Cataract  Goal:	stable  Instructions for follow-up, activity and diet:	Spoke with supervisor at Dr. Milner's office ([phone]), ophthalmologist, who stated pt had left cataract surgery on March 20th 2017 and had no complications. Pt went back on March 28th for a follow up and everything looked fine and pt had no complaints at that time. Pt needed to be on prednisolone drops for 3 weeks and now no longer needs eye drops. They recommend pt to follow up there after discharge for any complaints.  Secondary Diagnosis:	CLL (chronic lymphocytic leukemia)  Instructions for follow-up, activity and diet:	stable  Secondary Diagnosis:	Depression  Instructions for follow-up, activity and diet:	stable  follow rec of [facility]  Secondary Diagnosis:	Prostate cancer  Goal:	stable  Instructions for follow-up, activity and diet:	PO fluid restriction to 32 ounces a day  Stop all SSRI's  f/u CBC and BMP in 3 days. Avoid hypotonic fluids.   f/u with PMD Dr. Farias after [facility] treatment  f/u with Dr. Milner's office regarding cataracts Principal Discharge DX:	Hyponatremia  Goal:	stable  Instructions for follow-up, activity and diet:	PO fluid restriction to 32 ounces a day  Stop all SSRI's  f/u CBC and BMP in 3 days.   follow reccs from [facility]  Secondary Diagnosis:	Anxiety  Instructions for follow-up, activity and diet:	stable  follow rec of [facility]  Secondary Diagnosis:	Cataract  Goal:	stable  Instructions for follow-up, activity and diet:	Spoke with supervisor at Dr. Milner's office ([phone]), ophthalmologist, who stated pt had left cataract surgery on March 20th 2017 and had no complications. Pt went back on March 28th for a follow up and everything looked fine and pt had no complaints at that time. Pt needed to be on prednisolone drops for 3 weeks and now no longer needs eye drops. They recommend pt to follow up there after discharge for any complaints.  Secondary Diagnosis:	CLL (chronic lymphocytic leukemia)  Instructions for follow-up, activity and diet:	stable  Secondary Diagnosis:	Depression  Instructions for follow-up, activity and diet:	stable  follow rec of [facility]  Secondary Diagnosis:	Prostate cancer  Goal:	stable  Instructions for follow-up, activity and diet:	continue current medications for prostate enlargement.  Instructions for follow-up, activity and diet:	PO fluid restriction to 32 ounces a day  Stop all SSRI's  f/u CBC and BMP in 3 days. Avoid hypotonic fluids.   f/u with PMD Dr. Farias after [facility] treatment  f/u with Dr. Milner's office regarding cataracts Principal Discharge DX:	Hyponatremia  Goal:	stable  Instructions for follow-up, activity and diet:	PO fluid restriction to 32 ounces a day  Stop all SSRI's  f/u CBC and BMP in 3 days.   follow reccs from [facility]  Secondary Diagnosis:	Anxiety  Instructions for follow-up, activity and diet:	stable  follow rec of [facility]  Secondary Diagnosis:	Cataract  Goal:	stable  Instructions for follow-up, activity and diet:	Spoke with supervisor at Dr. Milner's office ([phone]), ophthalmologist, who stated pt had left cataract surgery on March 20th 2017 and had no complications. Pt went back on March 28th for a follow up and everything looked fine and pt had no complaints at that time. Pt needed to be on prednisolone drops for 3 weeks and now no longer needs eye drops. They recommend pt to follow up there after discharge for any complaints.  Secondary Diagnosis:	CLL (chronic lymphocytic leukemia)  Instructions for follow-up, activity and diet:	stable  Secondary Diagnosis:	Depression  Instructions for follow-up, activity and diet:	stable  follow rec of [facility]  Secondary Diagnosis:	Prostate cancer  Goal:	stable  Instructions for follow-up, activity and diet:	continue current medications for prostate enlargement.  Instructions for follow-up, activity and diet:	PO fluid restriction to 32 ounces a day  Stop all SSRI's  f/u CBC and BMP in 3 days. Avoid hypotonic fluids.   f/u with PMD Dr. Farias after [facility] treatment  f/u with Dr. Milner's office regarding cataracts Principal Discharge DX:	Hyponatremia  Goal:	stable  Instructions for follow-up, activity and diet:	PO fluid restriction to 32 ounces a day  Stop all SSRI's  f/u CBC and BMP in 3 days.   follow reccs from [facility]  Secondary Diagnosis:	Anxiety  Instructions for follow-up, activity and diet:	stable  follow rec of [facility]  Secondary Diagnosis:	Cataract  Goal:	stable  Instructions for follow-up, activity and diet:	Spoke with supervisor at Dr. Milner's office ([phone]), ophthalmologist, who stated pt had left cataract surgery on March 20th 2017 and had no complications. Pt went back on March 28th for a follow up and everything looked fine and pt had no complaints at that time. Pt needed to be on prednisolone drops for 3 weeks and now no longer needs eye drops. They recommend pt to follow up there after discharge for any complaints.  Secondary Diagnosis:	CLL (chronic lymphocytic leukemia)  Instructions for follow-up, activity and diet:	stable  Secondary Diagnosis:	Depression  Instructions for follow-up, activity and diet:	stable  follow rec of [facility]  Secondary Diagnosis:	Prostate cancer  Goal:	stable  Instructions for follow-up, activity and diet:	continue current medications for prostate enlargement.  Instructions for follow-up, activity and diet:	PO fluid restriction to 32 ounces a day  Stop all SSRI's  f/u CBC and BMP in 3 days. Avoid hypotonic fluids.   f/u with PMD Dr. Farias after [facility] treatment  f/u with Dr. Milner's office regarding cataracts Principal Discharge DX:	Hyponatremia  Goal:	stable  Instructions for follow-up, activity and diet:	PO fluid restriction to 32 ounces a day  Stop all SSRI's  f/u CBC and BMP in 3 days.   follow reccs from [facility]  Secondary Diagnosis:	Anxiety  Instructions for follow-up, activity and diet:	stable  follow rec of [facility]  Secondary Diagnosis:	Cataract  Goal:	stable  Instructions for follow-up, activity and diet:	Spoke with supervisor at Dr. Milner's office ([phone]), ophthalmologist, who stated pt had left cataract surgery on March 20th 2017 and had no complications. Pt went back on March 28th for a follow up and everything looked fine and pt had no complaints at that time. Pt needed to be on prednisolone drops for 3 weeks and now no longer needs eye drops. They recommend pt to follow up there after discharge for any complaints.  Secondary Diagnosis:	CLL (chronic lymphocytic leukemia)  Instructions for follow-up, activity and diet:	stable  Secondary Diagnosis:	Depression  Instructions for follow-up, activity and diet:	stable  follow rec of [facility]  Secondary Diagnosis:	Prostate cancer  Goal:	stable  Instructions for follow-up, activity and diet:	continue current medications for prostate enlargement.  Instructions for follow-up, activity and diet:	PO fluid restriction to 32 ounces a day  Stop all SSRI's  f/u CBC and BMP in 3 days. Avoid hypotonic fluids.   f/u with PMD Dr. Farias after [facility] treatment  f/u with Dr. Milner's office regarding cataracts Principal Discharge DX:	Hyponatremia  Goal:	stable  Instructions for follow-up, activity and diet:	PO fluid restriction to 32 ounces a day  Stop all SSRI's  f/u CBC and BMP in 3 days.   follow reccs from [facility]  Secondary Diagnosis:	Anxiety  Instructions for follow-up, activity and diet:	stable  follow rec of [facility]  Secondary Diagnosis:	Cataract  Goal:	stable  Instructions for follow-up, activity and diet:	Spoke with supervisor at Dr. Milner's office ([phone]), ophthalmologist, who stated pt had left cataract surgery on March 20th 2017 and had no complications. Pt went back on March 28th for a follow up and everything looked fine and pt had no complaints at that time. Pt needed to be on prednisolone drops for 3 weeks and now no longer needs eye drops. They recommend pt to follow up there after discharge for any complaints.  Secondary Diagnosis:	CLL (chronic lymphocytic leukemia)  Instructions for follow-up, activity and diet:	stable  Secondary Diagnosis:	Depression  Instructions for follow-up, activity and diet:	stable  follow rec of [facility]  Secondary Diagnosis:	Prostate cancer  Goal:	stable  Instructions for follow-up, activity and diet:	continue current medications for prostate enlargement.  Instructions for follow-up, activity and diet:	PO fluid restriction to 32 ounces a day  Stop all SSRI's  f/u CBC and BMP in 3 days. Avoid hypotonic fluids.   f/u with PMD Dr. Farias after [facility] treatment  f/u with Dr. Milner's office regarding cataracts Principal Discharge DX:	Hyponatremia  Goal:	stable  Instructions for follow-up, activity and diet:	PO fluid restriction to 32 ounces a day  Stop all SSRI's  f/u CBC and BMP in 3 days.   follow reccs from [facility]  Secondary Diagnosis:	Anxiety  Instructions for follow-up, activity and diet:	stable  follow rec of [facility]  Secondary Diagnosis:	Cataract  Goal:	stable  Instructions for follow-up, activity and diet:	Spoke with supervisor at Dr. Milner's office ([phone]), ophthalmologist, who stated pt had left cataract surgery on March 20th 2017 and had no complications. Pt went back on March 28th for a follow up and everything looked fine and pt had no complaints at that time. Pt needed to be on prednisolone drops for 3 weeks and now no longer needs eye drops. They recommend pt to follow up there after discharge for any complaints.  Secondary Diagnosis:	CLL (chronic lymphocytic leukemia)  Instructions for follow-up, activity and diet:	stable  Secondary Diagnosis:	Depression  Instructions for follow-up, activity and diet:	stable  follow rec of [facility]  Secondary Diagnosis:	Prostate cancer  Goal:	stable  Instructions for follow-up, activity and diet:	continue current medications for prostate enlargement.  Instructions for follow-up, activity and diet:	PO fluid restriction to 32 ounces a day  Stop all SSRI's  f/u CBC and BMP in 3 days. Avoid hypotonic fluids.   f/u with PMD Dr. Farias after [facility] treatment  f/u with Dr. Milner's office regarding cataracts

## 2017-04-25 NOTE — DISCHARGE NOTE ADULT - MEDICATION SUMMARY - MEDICATIONS TO TAKE
I will START or STAY ON the medications listed below when I get home from the hospital:    Proscar  --  by mouth   -- Indication: For Prostate cancer    Proscar 5 mg oral tablet  -- 1 tab(s) by mouth once a day  -- Indication: For Prostate cancer    Mylanta oral suspension  -- 30 milliliter(s) by mouth 4 times a day (after meals and at bedtime), As Needed  -- Indication: For Constipation    Flomax 0.4 mg oral capsule  -- 1 cap(s) by mouth once a day  -- Indication: For Prostate cancer    Flomax  --  by mouth   -- Indication: For Prostate cancer    Neurontin 100 mg oral capsule  -- 3 cap(s) by mouth 3 times a day  -- Indication: For Prostate cancer    Ativan 0.5 mg oral tablet  -- 1 tab(s) by mouth every 6 hours, As Needed  -- Indication: For Anxiety    docusate sodium 100 mg oral capsule  -- 1 cap(s) by mouth 3 times a day  -- Indication: For Constipation I will START or STAY ON the medications listed below when I get home from the hospital:    Proscar 5 mg oral tablet  -- 1 tab(s) by mouth once a day  -- Indication: For enlarged prostate    Mylanta oral suspension  -- 30 milliliter(s) by mouth 4 times a day (after meals and at bedtime), As Needed  -- Indication: For Constipation    Flomax 0.4 mg oral capsule  -- 1 cap(s) by mouth once a day  -- Indication: For enlarged prostate    Neurontin 100 mg oral capsule  -- 3 cap(s) by mouth 3 times a day  -- Indication: For Neuropathy    Ativan 0.5 mg oral tablet  -- 1 tab(s) by mouth every 6 hours, As Needed  -- Indication: For Anxiety    docusate sodium 100 mg oral capsule  -- 1 cap(s) by mouth 3 times a day  -- Indication: For Constipation    senna oral tablet  -- 2 tab(s) by mouth once a day (at bedtime), As needed, Constipation  -- Indication: For Constipation

## 2017-04-25 NOTE — DISCHARGE NOTE ADULT - CARE PROVIDERS DIRECT ADDRESSES
,DirectAddress_Unknown,DirectAddress_Unknown,DirectAddress_Unknown ,DirectAddress_Unknown,DirectAddress_Unknown,[email or web address],DirectAddress_Unknown

## 2017-04-25 NOTE — DISCHARGE NOTE ADULT - MEDICATION SUMMARY - MEDICATIONS TO STOP TAKING
I will STOP taking the medications listed below when I get home from the hospital:    Lexapro 10 mg oral tablet  -- 1 tab(s) by mouth once a day    CeleXA 20 mg oral tablet  -- 1 tab(s) by mouth once a day    Effexor XR 37.5 mg oral capsule, extended release  -- 1 cap(s) by mouth once a day    sodium chloride 1 g oral tablet  -- 1 tab(s) by mouth 3 times a day

## 2017-04-26 LAB
ANION GAP SERPL CALC-SCNC: 9 MMOL/L — SIGNIFICANT CHANGE UP (ref 5–17)
APPEARANCE UR: CLEAR — SIGNIFICANT CHANGE UP
BILIRUB UR-MCNC: NEGATIVE — SIGNIFICANT CHANGE UP
BUN SERPL-MCNC: 10 MG/DL — SIGNIFICANT CHANGE UP (ref 7–23)
CALCIUM SERPL-MCNC: 8.7 MG/DL — SIGNIFICANT CHANGE UP (ref 8.5–10.1)
CHLORIDE SERPL-SCNC: 93 MMOL/L — LOW (ref 96–108)
CHLORIDE UR-SCNC: 45 MMOL/L — SIGNIFICANT CHANGE UP
CO2 SERPL-SCNC: 27 MMOL/L — SIGNIFICANT CHANGE UP (ref 22–31)
COLOR SPEC: SIGNIFICANT CHANGE UP
CREAT SERPL-MCNC: 0.48 MG/DL — LOW (ref 0.5–1.3)
CULTURE RESULTS: NO GROWTH — SIGNIFICANT CHANGE UP
DIFF PNL FLD: NEGATIVE — SIGNIFICANT CHANGE UP
GLUCOSE SERPL-MCNC: 87 MG/DL — SIGNIFICANT CHANGE UP (ref 70–99)
GLUCOSE UR QL: NEGATIVE — SIGNIFICANT CHANGE UP
HCT VFR BLD CALC: 37.3 % — LOW (ref 39–50)
HGB BLD-MCNC: 13.1 G/DL — SIGNIFICANT CHANGE UP (ref 13–17)
KETONES UR-MCNC: NEGATIVE — SIGNIFICANT CHANGE UP
LEUKOCYTE ESTERASE UR-ACNC: NEGATIVE — SIGNIFICANT CHANGE UP
MCHC RBC-ENTMCNC: 35 PG — HIGH (ref 27–34)
MCHC RBC-ENTMCNC: 35.1 GM/DL — SIGNIFICANT CHANGE UP (ref 32–36)
MCV RBC AUTO: 99.6 FL — SIGNIFICANT CHANGE UP (ref 80–100)
NITRITE UR-MCNC: NEGATIVE — SIGNIFICANT CHANGE UP
PH UR: 7 — SIGNIFICANT CHANGE UP (ref 5–8)
PLATELET # BLD AUTO: 236 K/UL — SIGNIFICANT CHANGE UP (ref 150–400)
POTASSIUM SERPL-MCNC: 4 MMOL/L — SIGNIFICANT CHANGE UP (ref 3.5–5.3)
POTASSIUM SERPL-SCNC: 4 MMOL/L — SIGNIFICANT CHANGE UP (ref 3.5–5.3)
POTASSIUM UR-SCNC: 23 MMOL/L — SIGNIFICANT CHANGE UP
PROT UR-MCNC: NEGATIVE — SIGNIFICANT CHANGE UP
RBC # BLD: 3.75 M/UL — LOW (ref 4.2–5.8)
RBC # FLD: 10.7 % — SIGNIFICANT CHANGE UP (ref 10.3–14.5)
SODIUM SERPL-SCNC: 123 MMOL/L — LOW (ref 135–145)
SODIUM SERPL-SCNC: 129 MMOL/L — LOW (ref 135–145)
SODIUM UR-SCNC: 33 MMOL/L — SIGNIFICANT CHANGE UP
SP GR SPEC: 1 — LOW (ref 1.01–1.02)
SPECIMEN SOURCE: SIGNIFICANT CHANGE UP
URATE SERPL-MCNC: 2.4 MG/DL — LOW (ref 3.4–8.8)
UROBILINOGEN FLD QL: NEGATIVE — SIGNIFICANT CHANGE UP
WBC # BLD: 14.8 K/UL — HIGH (ref 3.8–10.5)
WBC # FLD AUTO: 14.8 K/UL — HIGH (ref 3.8–10.5)

## 2017-04-26 PROCEDURE — 99233 SBSQ HOSP IP/OBS HIGH 50: CPT | Mod: GC

## 2017-04-26 PROCEDURE — 90792 PSYCH DIAG EVAL W/MED SRVCS: CPT

## 2017-04-26 RX ADMIN — Medication 100 MILLIGRAM(S): at 05:30

## 2017-04-26 RX ADMIN — GABAPENTIN 300 MILLIGRAM(S): 400 CAPSULE ORAL at 14:10

## 2017-04-26 RX ADMIN — FINASTERIDE 5 MILLIGRAM(S): 5 TABLET, FILM COATED ORAL at 11:11

## 2017-04-26 RX ADMIN — GABAPENTIN 300 MILLIGRAM(S): 400 CAPSULE ORAL at 05:30

## 2017-04-26 RX ADMIN — TAMSULOSIN HYDROCHLORIDE 0.4 MILLIGRAM(S): 0.4 CAPSULE ORAL at 21:09

## 2017-04-26 RX ADMIN — Medication 100 MILLIGRAM(S): at 14:10

## 2017-04-26 RX ADMIN — Medication 0.5 MILLIGRAM(S): at 11:13

## 2017-04-26 RX ADMIN — GABAPENTIN 300 MILLIGRAM(S): 400 CAPSULE ORAL at 21:09

## 2017-04-26 RX ADMIN — Medication 100 MILLIGRAM(S): at 21:09

## 2017-04-26 NOTE — CONSULT NOTE ADULT - SUBJECTIVE AND OBJECTIVE BOX
76yMale admitted to med/surg with following history:  HPI:  76-year-old male with past medical history of major depression disorder, right inguinal hernia, prostate cancer, CLL, anxiety, status post cataract surgery, presents to ED from [facility] with an abnormal lab value – [id number]. patient was recently seen and treated at [facility] for major depressive disorder and hyponatremia, was discharged to [facility] for further treatment. patient states that he has been eating and drinking okay, denies any fever or chills any recent illness. has been feeling increased weakness, some slight confusion earlier, not present now.  Patient states that he's been feeling more cold lately. Had recent cateract surgery a few weeks ago.  patient denies fever, chills, chest pain, vision changes, headache, shortness of breath, ALDANA, abdominal pain, nausea, vomiting, diarrhea, dysuria, numbness, tingling. Patient denies recent sick contacts, recent travel.  Denies suicidal ideation/homicidal ideation.      In ED – labs remarkable for – WBC – 19.4, hematocrit – 37.2, sodium – 119, chloride – 84, glucose – 121, CXR – no acute pulmonary process.  patient received 1L NS bolus (24 Apr 2017 19:54)      Psych HPI: Patient is a 77 y/o SWM, , who reports recent feeling of increased anxiety in context of multiple medical problems. Patient reports that he became overwhelmed with the possibility of the upcoming procedures which resulted in his voluntary inpatient hospitalization.    Past Psych Hx: Recent hospitalization at [facility] for the last 4 days, prior to that long history of outpatient therapy treatment with is psychiatrist Dr. Josue Quiroz.    PAST MEDICAL & SURGICAL HISTORY:  Cataracts, bilateral  Anxiety  Chronic leukemia  Prostate cancer  Hernia, inguinal, right  Depression  Anxiety  Depression  CLL (chronic lymphocytic leukemia)  Hydrocele  Varicocele  Cataract  Umbilical hernia  Prostate cancer  Status post cataract extraction, unspecified laterality  No significant past surgical history  No significant past surgical history      Allergies    latex (Rash)  latex (Unknown)  lidocaine (Hypotension)  lidocaine (Unknown)    Intolerances      MEDICATIONS  (STANDING):  finasteride 5milliGRAM(s) Oral daily  tamsulosin 0.4milliGRAM(s) Oral at bedtime  gabapentin 300milliGRAM(s) Oral three times a day  enoxaparin Injectable 40milliGRAM(s) SubCutaneous every 24 hours  docusate sodium 100milliGRAM(s) Oral three times a day    MEDICATIONS  (PRN):  senna 2Tablet(s) Oral at bedtime PRN Constipation  LORazepam     Tablet 0.5milliGRAM(s) Oral every 6 hours PRN Anxiety      SH: Lives in [facility] [place] [place], alone, no children. He is a practicing  - immigration and criminal law  Sub Abuse Hx:    FPH: Denies    ROS: Psych: See HPI.  All other systems negative.                          13.1   14.8  )-----------( 236      ( 26 Apr 2017 08:24 )             37.3   26 Apr 2017 08:24    129    |  93     |  10     ----------------------------<  87     4.0     |  27     |  0.48     Ca    8.7        26 Apr 2017 08:24  Phos  3.1       25 Apr 2017 08:19  Mg     2.3       25 Apr 2017 08:19    TPro  6.3    /  Alb  3.6    /  TBili  0.5    /  DBili  x      /  AST  33     /  ALT  39     /  AlkPhos  48     24 Apr 2017 18:26    TSH:     Utox:  Imaging:  Other Tests:    Old Records reviewed: Unavailable    Collateral: Unavailable    EXAM:  Vital Signs Last 24 Hrs  T(C): 36.6, Max: 37.1 (04-25 @ 14:25)  T(F): 97.9, Max: 98.7 (04-25 @ 14:25)  HR: 70 (70 - 82)  BP: 99/63 (99/63 - 126/73)  BP(mean): --  RR: 18 (17 - 18)  SpO2: 96% (96% - 98%)  Gen Appearance: Well groomed  Gait/Station/Muscle Tone: WNL  Abnl Movements: Absent  Speech: Normoproductive, relevant  TP; WNL  Associations: WNL  TC: WNL  Mood: Somewhat anxious and depressed  Affect: Constricted  Consciousness/orientation: WNL  Memory:   Recent: Intact    Remote: Intact  Attention/Concentration: WNL  Language: WNL  Fund of Knowledge: WNL  Insight: Fair   Judgment: Fair    Suicide Risk Assessment: At this time patient wants to return to an inpatient psychiatric unit due to symptoms of depression and anxiety    IMP:  77 y/o obsessive  struggling with stress of medical and financial issues  DX: Adjustment disorder with anxiety and depression, OCD    REC: Patient will be transferred to [facility] once medically cleared  Observation Status:  Additional Work-up:  Medication Recs:  Follow-up: As needed
76-year-old male with past medical history of depression, anxiety, right inguinal hernia, prostate cancer, CLL, anxiety, status post recent cataract surgery, admitted from [facility] with Na-119.S/p recent hospitalization at [facility] for major depressive disorder and hyponatremia, was discharged to [facility] for further treatment. Now readmitted with Na drop. Received NS in E.R with rapid sodium correction. He does relate increased water intake at [facility]. No headache, no pain, no nausea, no vertigo, no voiding c/o. No prior hyponatremia other than recent.    PAST MEDICAL & SURGICAL HISTORY:  Cataracts, bilateral  Anxiety  Chronic leukemia  Prostate cancer  Hernia, inguinal, right  Depression  Anxiety  Depression  CLL (chronic lymphocytic leukemia)  Hydrocele  Varicocele  Cataract  Umbilical hernia  Prostate cancer  Status post cataract extraction, unspecified laterality  No significant past surgical history  No significant past surgical history      latex (Rash)  latex (Unknown)  lidocaine (Hypotension)  lidocaine (Unknown)    MEDICATIONS  (STANDING):  finasteride 5milliGRAM(s) Oral daily  tamsulosin 0.4milliGRAM(s) Oral at bedtime  gabapentin 300milliGRAM(s) Oral three times a day  enoxaparin Injectable 40milliGRAM(s) SubCutaneous every 24 hours  docusate sodium 100milliGRAM(s) Oral three times a day    MEDICATIONS  (PRN):  senna 2Tablet(s) Oral at bedtime PRN Constipation  ibuprofen  Tablet 400milliGRAM(s) Oral every 6 hours PRN pain  LORazepam     Tablet 0.5milliGRAM(s) Oral every 6 hours PRN Anxiety    I&O's Detail    I & Os for current day (as of 2017 14:51)  =============================================  IN:    Total IN: 0 ml  ---------------------------------------------  OUT:    Voided: 1700 ml    Total OUT: 1700 ml  ---------------------------------------------  Total NET: -1700 ml    Vital Signs Last 24 Hrs  T(C): 37.1, Max: 37.1 ( @ 14:25)  T(F): 98.7, Max: 98.7 ( @ 14:25)  HR: 80 (69 - 85)  BP: 126/73 (110/67 - 131/78)  BP(mean): --  RR: 17 (14 - 17)  SpO2: 98% (96% - 99%)    PHYSICAL EXAM:  General: NAD, AAO x3,   Neuro exam grossly nonfocal  Respiratory: b/l air entry, clear  Cardiovascular: S1 S2 regular  Gastrointestinal: soft, NT, BS present, no bladder distension  Extremities: no edema      CAPILLARY BLOOD GLUCOSE        128<L>  |  93<L>  |  10  ----------------------------<  93  4.1   |  26  |  0.45<L>    Ca    8.4<L>      2017 08:19  Phos  3.1       Mg     2.3         TPro  6.3  /  Alb  3.6  /  TBili  0.5  /  DBili  x   /  AST  33  /  ALT  39  /  AlkPhos  48      Potassium, Serum: 4.1 mmol/L ( @ 08:19)  Blood Urea Nitrogen, Serum: 10 mg/dL ( @ 08:19)  Calcium, Total Serum: 8.4 mg/dL ( @ 08:19)  Hemoglobin: 12.8 g/dL ( @ 08:19)  Hematocrit: 36.7 % ( @ 08:19)  Potassium, Serum: 4.2 mmol/L ( @ 00:14)  Blood Urea Nitrogen, Serum: 12 mg/dL ( @ 00:14)  Calcium, Total Serum: 8.2 mg/dL ( @ 00:14)  Potassium, Serum: 4.4 mmol/L ( @ 18:26)  Blood Urea Nitrogen, Serum: 18 mg/dL ( @ 18:26)  Calcium, Total Serum: 8.5 mg/dL ( @ 18:26)  Hemoglobin: 13.2 g/dL ( @ 18:26)  Hematocrit: 37.2 % ( @ 18:26)      Creatinine, Serum: 0.45 ( @ 08:19)  Creatinine, Serum: 0.49 ( @ 00:14)  Creatinine, Serum: 0.64 ( @ 18:26)    CBC Full  -  ( 2017 08:19 )  WBC Count : 15.9 K/uL  Hemoglobin : 12.8 g/dL  Hematocrit : 36.7 %  Platelet Count - Automated : 231 K/uL  Mean Cell Volume : 98.5 fl  Mean Cell Hemoglobin : 34.4 pg  Mean Cell Hemoglobin Concentration : 34.9 gm/dL  Auto Neutrophil # : x  Auto Lymphocyte # : x  Auto Monocyte # : x  Auto Eosinophil # : x  Auto Basophil # : x  Auto Neutrophil % : 19.0 %  Auto Lymphocyte % : 75.0 %  Auto Monocyte % : 2.0 %  Auto Eosinophil % : 1.0 %  Auto Basophil % : x    Urinalysis Basic - ( 2017 20:05 )    Color: Yellow / Appearance: Clear / S.020 / pH: x  Gluc: x / Ketone: Negative  / Bili: Negative / Urobili: Negative   Blood: x / Protein: 25 mg/dL / Nitrite: Negative   Leuk Esterase: Negative / RBC: 0-2 /HPF / WBC 0-2   Sq Epi: x / Non Sq Epi: Occasional / Bacteria: x      CXR with no active pulmonary pathology. TSH normal. Uric Acid is low.

## 2017-04-26 NOTE — PROGRESS NOTE ADULT - ASSESSMENT
76-year-old male with past medical history of major depression disorder, right inguinal hernia, prostate cancer, CLL, anxiety, status post cataract surgery, presents to ED from [facility] with an abnormal lab value – [id number], admitted with hyponatremia

## 2017-04-26 NOTE — PROGRESS NOTE ADULT - PROBLEM SELECTOR PLAN 6
stable pt currently complaining of issues with left eye where surgery was  - call was put out to Dr. Michael Milner, ornithologist, waiting call back pt currently complaining of issues with left eye where surgery was  - Spoke with supervisor at Dr. Milner's office ([phone]), ophthalmologist, who stated pt had left cataract surgery on March 20th 2017 and had no complications. Pt went back on March 28th for a follow up and everything looked fine and pt had no complaints at that time. Pt needed to be on prednisolone drops for 3 weeks and now no longer needs eye drops. They recommend pt to follow up there after discharge for any complaints.

## 2017-04-26 NOTE — PHYSICAL THERAPY INITIAL EVALUATION ADULT - ADDITIONAL COMMENTS
Pt lives alone in an apt without steps in [place] and was independent without a device. pt admitted here from [facility].

## 2017-04-26 NOTE — PROGRESS NOTE ADULT - SUBJECTIVE AND OBJECTIVE BOX
HPI:  76-year-old male with past medical history of major depression disorder, right inguinal hernia, prostate cancer, CLL, anxiety, status post cataract surgery, presented to ED from [facility] with an abnormal lab value – [id number]. Pt was recently seen and treated at [facility] for major depressive disorder and hyponatremia, was discharged to [facility] for further treatment. Pt has been feeling increased weakness, some slight confusion earlier, which resolved.  Patient stateed that he's been feeling more cold lately. Denies suicidal ideation/homicidal ideation.  Spoke with psychologist, Dr. Garcia, ([phone]) who confirmed pt is not currently on any SSRI as pt is hyponatremic. Na is currently 129. Will f/u. Spoke with PMD Dr Waldo Farias who stated pt has been altered as per his sister who called him regarding his strange behavior recently. PMD stated all of this is new behavior within the last few weeks.    REVIEW OF SYSTEMS:    CONSTITUTIONAL: No weakness, fevers or chills  EYES/ENT: + visual problems in both right and left eye, recent left eye cataract surgery, no throat pain   RESPIRATORY: No cough, wheezing, hemoptysis; No shortness of breath  CARDIOVASCULAR: No chest pain or palpitations  GASTROINTESTINAL: No abdominal, nausea, vomiting, or hematemesis; No diarrhea or constipation. No melena or hematochezia.  GENITOURINARY: No dysuria, frequency or hematuria  NEUROLOGICAL: No dizziness, numbness, or weakness  SKIN: No itching, burning, rashes, or lesions   All other review of systems is negative unless indicated above.    Vital Signs Last 24 Hrs  T(C): 36.6, Max: 37.1 (04-25 @ 14:25)  T(F): 97.9, Max: 98.7 (04-25 @ 14:25)  HR: 70 (70 - 82)  BP: 99/63 (99/63 - 126/73)  BP(mean): --  RR: 18 (17 - 18)  SpO2: 96% (96% - 98%)    PHYSICAL EXAM:     GENERAL: no acute distress  HEENT: NC/AT, EOMI, neck supple, MMM  RESPIRATORY: LCTAB/L, no rhonchi, rales, or wheezing  CARDIOVASCULAR: RRR, no murmurs, gallops, rubs  ABDOMINAL: soft, non-tender, non-distended, positive bowel sounds   EXTREMITIES: no clubbing, cyanosis, or edema  NEUROLOGICAL: alert and oriented x 3, non-focal  SKIN: no rashes or lesions   MUSCULOSKELETAL: no gross joint deformity    LABS:                                   13.1   14.8  )-----------( 236      ( 2017 08:24 )             37.3     04-26    129<L>  |  93<L>  |  10  ----------------------------<  87  4.0   |  27  |  0.48<L>    Ca    8.7      2017 08:24  Phos  3.1     04-25  Mg     2.3     04-25    TPro  6.3  /  Alb  3.6  /  TBili  0.5  /  DBili  x   /  AST  33  /  ALT  39  /  AlkPhos  48  04-24    PT/INR - ( 2017 18:26 )   PT: 10.5 sec;   INR: 0.96 ratio         PTT - ( 2017 18:26 )  PTT:28.7 sec  Urinalysis Basic - ( 2017 09:58 )    Color: Pale Yellow / Appearance: Clear / S.005 / pH: x  Gluc: x / Ketone: Negative  / Bili: Negative / Urobili: Negative   Blood: x / Protein: Negative / Nitrite: Negative   Leuk Esterase: Negative / RBC: x / WBC x   Sq Epi: x / Non Sq Epi: x / Bacteria: x    CAPILLARY BLOOD GLUCOSE    MEDICATIONS  (STANDING):  finasteride 5milliGRAM(s) Oral daily  tamsulosin 0.4milliGRAM(s) Oral at bedtime  gabapentin 300milliGRAM(s) Oral three times a day  enoxaparin Injectable 40milliGRAM(s) SubCutaneous every 24 hours  docusate sodium 100milliGRAM(s) Oral three times a day    MEDICATIONS  (PRN):  senna 2Tablet(s) Oral at bedtime PRN Constipation  LORazepam     Tablet 0.5milliGRAM(s) Oral every 6 hours PRN Anxiety HPI:  76-year-old male with past medical history of major depression disorder, right inguinal hernia, prostate cancer, CLL, anxiety, status post cataract surgery, presented to ED from [facility] with an abnormal lab value – [id number]. Pt was recently seen and treated at [facility] for major depressive disorder and hyponatremia, was discharged to [facility] for further treatment. Pt has been feeling increased weakness, some slight confusion earlier, which resolved.  Patient stateed that he's been feeling more cold lately. Denies suicidal ideation/homicidal ideation.  Spoke with psychologist, Dr. Garcia, ([phone]) who confirmed pt is not currently on any SSRI as pt is hyponatremic. Na is currently 129. Will f/u. Spoke with PMD Dr Waldo Farias who stated pt has been altered as per his sister who called him regarding his strange behavior recently. PMD stated all of this is new behavior within the last few weeks. Spoke with supervisor at dr. Milner's office, ophthalmologist, who stated pt had left cataract surgery on  and had no complications. Pt went back on  for a follow up and everything looked fine and pt had no complaints at that time. Pt needed to be on prednisolone for 3 weeks and no longer needs eye drops. They recommend pt to follow up there after discharge for any complaints,    REVIEW OF SYSTEMS:    CONSTITUTIONAL: No weakness, fevers or chills  EYES/ENT: + visual problems in both right and left eye, recent left eye cataract surgery, no throat pain   RESPIRATORY: No cough, wheezing, hemoptysis; No shortness of breath  CARDIOVASCULAR: No chest pain or palpitations  GASTROINTESTINAL: No abdominal, nausea, vomiting, or hematemesis; No diarrhea or constipation. No melena or hematochezia.  GENITOURINARY: No dysuria, frequency or hematuria  NEUROLOGICAL: No dizziness, numbness, or weakness  SKIN: No itching, burning, rashes, or lesions   All other review of systems is negative unless indicated above.    Vital Signs Last 24 Hrs  T(C): 36.6, Max: 37.1 (04- @ 14:25)  T(F): 97.9, Max: 98.7 (04- @ 14:25)  HR: 70 (70 - 82)  BP: 99/63 (99/63 - 126/73)  BP(mean): --  RR: 18 (17 - 18)  SpO2: 96% (96% - 98%)    PHYSICAL EXAM:     GENERAL: no acute distress  HEENT: NC/AT, EOMI, neck supple, MMM  RESPIRATORY: LCTAB/L, no rhonchi, rales, or wheezing  CARDIOVASCULAR: RRR, no murmurs, gallops, rubs  ABDOMINAL: soft, non-tender, non-distended, positive bowel sounds   EXTREMITIES: no clubbing, cyanosis, or edema  NEUROLOGICAL: alert and oriented x 3, non-focal  SKIN: no rashes or lesions   MUSCULOSKELETAL: no gross joint deformity    LABS:                                   13.1   14.8  )-----------( 236      ( 2017 08:24 )             37.3     04-26    129<L>  |  93<L>  |  10  ----------------------------<  87  4.0   |  27  |  0.48<L>    Ca    8.7      2017 08:24  Phos  3.1     04-25  Mg     2.3     04-25    TPro  6.3  /  Alb  3.6  /  TBili  0.5  /  DBili  x   /  AST  33  /  ALT  39  /  AlkPhos  48  04-24    PT/INR - ( 2017 18:26 )   PT: 10.5 sec;   INR: 0.96 ratio         PTT - ( 2017 18:26 )  PTT:28.7 sec  Urinalysis Basic - ( 2017 09:58 )    Color: Pale Yellow / Appearance: Clear / S.005 / pH: x  Gluc: x / Ketone: Negative  / Bili: Negative / Urobili: Negative   Blood: x / Protein: Negative / Nitrite: Negative   Leuk Esterase: Negative / RBC: x / WBC x   Sq Epi: x / Non Sq Epi: x / Bacteria: x    CAPILLARY BLOOD GLUCOSE    MEDICATIONS  (STANDING):  finasteride 5milliGRAM(s) Oral daily  tamsulosin 0.4milliGRAM(s) Oral at bedtime  gabapentin 300milliGRAM(s) Oral three times a day  enoxaparin Injectable 40milliGRAM(s) SubCutaneous every 24 hours  docusate sodium 100milliGRAM(s) Oral three times a day    MEDICATIONS  (PRN):  senna 2Tablet(s) Oral at bedtime PRN Constipation  LORazepam     Tablet 0.5milliGRAM(s) Oral every 6 hours PRN Anxiety

## 2017-04-26 NOTE — PROGRESS NOTE ADULT - PROBLEM SELECTOR PLAN 1
- acute vs acute on chronic  - per psych Dr. Garcia at [facility] - pt was on Celexa and stopped, transitioned to effexor x1 dose which was stopped 2/2 to hyponatremia  - Spoke with PMD Dr Waldo Farias who stated pt has been altered as per his sister who called him regarding his strange behavior recently. PMD stated all of this is new behavior within the last few weeks.  - urine lytes - K 23, Na 33, Cl 45  - thyroid panel - WNL (TSH 2.74, T3 100, T4 6.5)   - f/u with [facility] - Na corrected too fast. Monitor and no intevention. Once stability is confirm over the next 24 hours will place on 32 Oz fluid restriction. Avoid SSRI and NSAIDS.  - uric acid 2.4 this am

## 2017-04-26 NOTE — PROGRESS NOTE ADULT - ATTENDING COMMENTS
Will begin fluid restriction per Renal rec. Continue to monitor sodium level. Patient otherwise stable.

## 2017-04-26 NOTE — PROGRESS NOTE ADULT - PROBLEM SELECTOR PLAN 1
Improving sodium levels. PO fluid restriction. Will follow lytes and RF trend. Avoid hypotonic fluids.

## 2017-04-26 NOTE — PROGRESS NOTE ADULT - SUBJECTIVE AND OBJECTIVE BOX
Patient seen in follow up for hyponatremia / SIADH. Improving sodium levels. Stable renal function.     PAST MEDICAL HISTORY:  Cataracts, bilateral  Anxiety  Chronic leukemia  Prostate cancer  Hernia, inguinal, right  Depression  Anxiety  Depression  CLL (chronic lymphocytic leukemia)  Hydrocele  Varicocele  Cataract  Umbilical hernia  Prostate cancer    MEDICATIONS  (STANDING):  finasteride 5milliGRAM(s) Oral daily  tamsulosin 0.4milliGRAM(s) Oral at bedtime  gabapentin 300milliGRAM(s) Oral three times a day  enoxaparin Injectable 40milliGRAM(s) SubCutaneous every 24 hours  docusate sodium 100milliGRAM(s) Oral three times a day    MEDICATIONS  (PRN):  senna 2Tablet(s) Oral at bedtime PRN Constipation  LORazepam     Tablet 0.5milliGRAM(s) Oral every 6 hours PRN Anxiety    T(C): 36.6, Max: 37.1 (- @ 14:25)  HR: 70 (70 - 82)  BP: 99/63 (99/63 - 126/73)  RR: 18 (16 - 18)  SpO2: 96% (96% - 98%)  Wt(kg): --  I&O's Detail      PHYSICAL EXAM:  General: NAD  Respiratory: b/l air entry  Cardiovascular: S1 S2  Gastrointestinal: soft  Extremities:  no edema                          13.1   14.8  )-----------( 236      ( 2017 08:24 )             37.3     04-    129<L>  |  93<L>  |  10  ----------------------------<  87  4.0   |  27  |  0.48<L>    Ca    8.7      2017 08:24  Phos  3.1     04-25  Mg     2.3     -25    TPro  6.3  /  Alb  3.6  /  TBili  0.5  /  DBili  x   /  AST  33  /  ALT  39  /  AlkPhos  48  24        LIVER FUNCTIONS - ( 2017 18:26 )  Alb: 3.6 g/dL / Pro: 6.3 g/dL / ALK PHOS: 48 U/L / ALT: 39 U/L / AST: 33 U/L / GGT: x           Urinalysis Basic - ( 2017 09:58 )    Color: Pale Yellow / Appearance: Clear / S.005 / pH: x  Gluc: x / Ketone: Negative  / Bili: Negative / Urobili: Negative   Blood: x / Protein: Negative / Nitrite: Negative   Leuk Esterase: Negative / RBC: x / WBC x   Sq Epi: x / Non Sq Epi: x / Bacteria: x            Sodium, Serum: 129 ( @ 08:24)  Sodium, Serum: 125 ( @ 20:03)  Sodium, Serum: 128 ( @ 15:39)  Sodium, Serum: 128 ( 08:19)    Creatinine, Serum: 0.48 ( @ 08:24)  Creatinine, Serum: 0.69 ( @ 15:39)  Creatinine, Serum: 0.45 ( @ 08:19)  Creatinine, Serum: 0.49 ( 00:14)  Creatinine, Serum: 0.64 ( @ 18:26)    Potassium, Serum: 4.0 ( @ 08:24)  Potassium, Serum: 4.3 ( @ 15:39)  Potassium, Serum: 4.1 ( @ 08:19)  Potassium, Serum: 4.2 ( @ 00:14)    Hemoglobin: 13.1 ( @ 08:24)  Hemoglobin: 12.8 ( @ 08:19)  Hemoglobin: 13.2 ( 18:26)

## 2017-04-27 DIAGNOSIS — D72.829 ELEVATED WHITE BLOOD CELL COUNT, UNSPECIFIED: ICD-10-CM

## 2017-04-27 LAB
ANION GAP SERPL CALC-SCNC: 7 MMOL/L — SIGNIFICANT CHANGE UP (ref 5–17)
BUN SERPL-MCNC: 10 MG/DL — SIGNIFICANT CHANGE UP (ref 7–23)
CALCIUM SERPL-MCNC: 8.8 MG/DL — SIGNIFICANT CHANGE UP (ref 8.5–10.1)
CHLORIDE SERPL-SCNC: 93 MMOL/L — LOW (ref 96–108)
CO2 SERPL-SCNC: 31 MMOL/L — SIGNIFICANT CHANGE UP (ref 22–31)
CREAT SERPL-MCNC: 0.63 MG/DL — SIGNIFICANT CHANGE UP (ref 0.5–1.3)
GLUCOSE SERPL-MCNC: 127 MG/DL — HIGH (ref 70–99)
HCT VFR BLD CALC: 39.6 % — SIGNIFICANT CHANGE UP (ref 39–50)
HGB BLD-MCNC: 13.8 G/DL — SIGNIFICANT CHANGE UP (ref 13–17)
MCHC RBC-ENTMCNC: 34.7 PG — HIGH (ref 27–34)
MCHC RBC-ENTMCNC: 34.9 GM/DL — SIGNIFICANT CHANGE UP (ref 32–36)
MCV RBC AUTO: 99.6 FL — SIGNIFICANT CHANGE UP (ref 80–100)
PLATELET # BLD AUTO: 250 K/UL — SIGNIFICANT CHANGE UP (ref 150–400)
POTASSIUM SERPL-MCNC: 4.3 MMOL/L — SIGNIFICANT CHANGE UP (ref 3.5–5.3)
POTASSIUM SERPL-SCNC: 4.3 MMOL/L — SIGNIFICANT CHANGE UP (ref 3.5–5.3)
RBC # BLD: 3.98 M/UL — LOW (ref 4.2–5.8)
RBC # FLD: 11 % — SIGNIFICANT CHANGE UP (ref 10.3–14.5)
SODIUM SERPL-SCNC: 131 MMOL/L — LOW (ref 135–145)
WBC # BLD: 17.4 K/UL — HIGH (ref 3.8–10.5)
WBC # FLD AUTO: 17.4 K/UL — HIGH (ref 3.8–10.5)

## 2017-04-27 PROCEDURE — 99233 SBSQ HOSP IP/OBS HIGH 50: CPT | Mod: GC

## 2017-04-27 PROCEDURE — 99231 SBSQ HOSP IP/OBS SF/LOW 25: CPT

## 2017-04-27 RX ORDER — SODIUM CHLORIDE 9 MG/ML
1 INJECTION INTRAMUSCULAR; INTRAVENOUS; SUBCUTANEOUS
Qty: 0 | Refills: 0 | COMMUNITY

## 2017-04-27 RX ORDER — SODIUM CHLORIDE 9 MG/ML
1 INJECTION INTRAMUSCULAR; INTRAVENOUS; SUBCUTANEOUS ONCE
Qty: 0 | Refills: 0 | Status: COMPLETED | OUTPATIENT
Start: 2017-04-27 | End: 2017-04-27

## 2017-04-27 RX ORDER — VENLAFAXINE HCL 75 MG
1 CAPSULE, EXT RELEASE 24 HR ORAL
Qty: 0 | Refills: 0 | COMMUNITY

## 2017-04-27 RX ORDER — DOCUSATE SODIUM 100 MG
1 CAPSULE ORAL
Qty: 0 | Refills: 0 | COMMUNITY
Start: 2017-04-27

## 2017-04-27 RX ORDER — IBUPROFEN 200 MG
1 TABLET ORAL
Qty: 0 | Refills: 0 | COMMUNITY

## 2017-04-27 RX ADMIN — FINASTERIDE 5 MILLIGRAM(S): 5 TABLET, FILM COATED ORAL at 11:20

## 2017-04-27 RX ADMIN — GABAPENTIN 300 MILLIGRAM(S): 400 CAPSULE ORAL at 13:51

## 2017-04-27 RX ADMIN — ENOXAPARIN SODIUM 40 MILLIGRAM(S): 100 INJECTION SUBCUTANEOUS at 22:06

## 2017-04-27 RX ADMIN — Medication 100 MILLIGRAM(S): at 13:50

## 2017-04-27 RX ADMIN — SODIUM CHLORIDE 1 GRAM(S): 9 INJECTION INTRAMUSCULAR; INTRAVENOUS; SUBCUTANEOUS at 11:20

## 2017-04-27 RX ADMIN — ENOXAPARIN SODIUM 40 MILLIGRAM(S): 100 INJECTION SUBCUTANEOUS at 00:00

## 2017-04-27 RX ADMIN — Medication 0.5 MILLIGRAM(S): at 11:51

## 2017-04-27 RX ADMIN — Medication 100 MILLIGRAM(S): at 05:38

## 2017-04-27 RX ADMIN — Medication 100 MILLIGRAM(S): at 22:05

## 2017-04-27 RX ADMIN — GABAPENTIN 300 MILLIGRAM(S): 400 CAPSULE ORAL at 22:05

## 2017-04-27 RX ADMIN — GABAPENTIN 300 MILLIGRAM(S): 400 CAPSULE ORAL at 05:38

## 2017-04-27 RX ADMIN — TAMSULOSIN HYDROCHLORIDE 0.4 MILLIGRAM(S): 0.4 CAPSULE ORAL at 22:05

## 2017-04-27 NOTE — PROGRESS NOTE ADULT - PROBLEM SELECTOR PLAN 4
stable, not currently in treatment  Hem Onc FU as out pt, Dr Estrada continue with ativan PRN  continue with gabapentin

## 2017-04-27 NOTE — PROGRESS NOTE ADULT - ASSESSMENT
76-year-old male with past medical history of major depression disorder, right inguinal hernia, prostate cancer, CLL, anxiety, status post cataract surgery, presents to ED from [facility] with an abnormal lab value – [id number], admitted with hyponatremia 76-year-old male with past medical history of major depression disorder, right inguinal hernia, prostate cancer, CLL, anxiety, status post cataract surgery, presents to ED from [facility] with an abnormal lab value – [id number], admitted with hyponatremia.

## 2017-04-27 NOTE — PROGRESS NOTE ADULT - PROBLEM SELECTOR PLAN 6
pt currently complaining of issues with left eye where surgery was  - Spoke with supervisor at Dr. Milner's office ([phone]), ophthalmologist, who stated pt had left cataract surgery on March 20th 2017 and had no complications. Pt went back on March 28th for a follow up and everything looked fine and pt had no complaints at that time. Pt needed to be on prednisolone drops for 3 weeks and now no longer needs eye drops. They recommend pt to follow up there after discharge for any complaints. stable, not currently in treatment

## 2017-04-27 NOTE — PROGRESS NOTE ADULT - ATTENDING COMMENTS
Sodium improved, clear for d/c back to [facility] from renal. Patient with persistent leukocytosis. No signs of infection, blood and urine cultures negative, urinalysis unremarkable, cxr unremarkable, no fevers. This is likely reactive and can be repeated/monitored outpatient. Awaiting approval for return to [facility].

## 2017-04-27 NOTE — PROGRESS NOTE ADULT - SUBJECTIVE AND OBJECTIVE BOX
76yMale admitted to med/surg with following history:  HPI:  76-year-old male with past medical history of major depression disorder, right inguinal hernia, prostate cancer, CLL, anxiety, status post cataract surgery, presents to ED from [facility] with an abnormal lab value – [id number]. patient was recently seen and treated at [facility] for major depressive disorder and hyponatremia, was discharged to [facility] for further treatment. patient states that he has been eating and drinking okay, denies any fever or chills any recent illness. has been feeling increased weakness, some slight confusion earlier, not present now.  Patient states that he's been feeling more cold lately. Had recent cateract surgery a few weeks ago.  patient denies fever, chills, chest pain, vision changes, headache, shortness of breath, ALDANA, abdominal pain, nausea, vomiting, diarrhea, dysuria, numbness, tingling. Patient denies recent sick contacts, recent travel.  Denies suicidal ideation/homicidal ideation.      In ED – labs remarkable for – WBC – 19.4, hematocrit – 37.2, sodium – 119, chloride – 84, glucose – 121, CXR – no acute pulmonary process.  patient received 1L NS bolus (24 Apr 2017 19:54)      Psych HPI: Patient reports increased anxiety, but denies worsening of sleep or appetite. No acute symptoms of psychosis, omer or depression.    PAST MEDICAL & SURGICAL HISTORY:  Cataracts, bilateral  Anxiety  Chronic leukemia  Prostate cancer  Hernia, inguinal, right  Depression  Anxiety  Depression  CLL (chronic lymphocytic leukemia)  Hydrocele  Varicocele  Cataract  Umbilical hernia  Prostate cancer  Status post cataract extraction, unspecified laterality  No significant past surgical history  No significant past surgical history      Allergies    latex (Rash)  latex (Unknown)  lidocaine (Hypotension)  lidocaine (Unknown)    Intolerances      MEDICATIONS  (STANDING):  finasteride 5milliGRAM(s) Oral daily  tamsulosin 0.4milliGRAM(s) Oral at bedtime  gabapentin 300milliGRAM(s) Oral three times a day  enoxaparin Injectable 40milliGRAM(s) SubCutaneous every 24 hours  docusate sodium 100milliGRAM(s) Oral three times a day    MEDICATIONS  (PRN):  senna 2Tablet(s) Oral at bedtime PRN Constipation  LORazepam     Tablet 0.5milliGRAM(s) Oral every 6 hours PRN Anxiety        ROS: Psych: See HPI.  All other systems negative.                          13.8   17.4  )-----------( 250      ( 27 Apr 2017 08:53 )             39.6   27 Apr 2017 08:53    131    |  93     |  10     ----------------------------<  127    4.3     |  31     |  0.63     Ca    8.8        27 Apr 2017 08:53      TSH:     Utox:  Imaging:  Other Tests:    Old Records reviewed: Unavailabe    EXAM:  Vital Signs Last 24 Hrs  T(C): 36.6, Max: 37.3 (04-26 @ 13:49)  T(F): 97.8, Max: 99.2 (04-26 @ 13:49)  HR: 72 (72 - 83)  BP: 114/73 (113/61 - 125/89)  BP(mean): --  RR: 16 (16 - 18)  SpO2: 97% (96% - 98%)  Gen Appearance: Well groomed  Gait/Station/Muscle Tone: WNL  Abnl Movements: Absent  Speech: Normoproductive, relevant  TP; WNL  Associations: WNL  TC: WNL  Mood: Somewhat anxious and depressed  Affect: Constricted  Consciousness/orientation: WNL  Memory:   Recent: Intact    Remote: Intact  Attention/Concentration: WNL  Language: WNL  Fund of Knowledge: WNL  Insight: Fair   Judgment: Fair    Suicide Risk Assessment: At this time patient wants to return to an inpatient psychiatric unit due to symptoms of depression and anxiety    IMP:  77 y/o obsessive  struggling with stress of medical and financial issues  DX: Adjustment disorder with anxiety and depression, OCD    REC: Patient will be transferred to [facility] once medically cleared  Observation Status:  Additional Work-up:  Medication Recs:  Follow-up: As needed    Assessment and Recommendation:   · Assessment		  Patient to be transferred to [facility] on a voluntary basis once medically cleared

## 2017-04-27 NOTE — PROGRESS NOTE ADULT - PROBLEM SELECTOR PLAN 1
- acute vs acute on chronic   - Na currently **********  - per psych Dr. Garcia at [facility] - pt was on Celexa and stopped, transitioned to effexor x1 dose which was stopped 2/2 to hyponatremia  - Spoke with PMD Dr Waldo Farias who stated pt has been altered as per his sister who called him regarding his strange behavior recently. PMD stated all of this is new behavior within the last few weeks.  - urine lytes - K 23, Na 33, Cl 45  - thyroid panel - WNL (TSH 2.74, T3 100, T4 6.5)   - f/u with Renal Consult-[facility] - Na corrected too fast. Monitor and no intevention. Once stability is confirm over the next 24 hours will place on 32 Oz fluid restriction. Avoid SSRI and NSAIDS.  - uric acid 2.4 this am - acute vs acute on chronic   - Na currently 131 after 123 last night - after pt was fluid restricted   - Renal Consulted, Dr. Burnham - Improving sodium levels. PO fluid restriction. Will follow lytes and RF trend. Avoid hypotonic fluids.   - per psych Dr. Garcia at [facility] - pt was on Celexa and stopped, transitioned to effexor x1 dose which was stopped 2/2 to hyponatremia  - Spoke with PMD Dr Waldo Farias who stated pt has been altered as per his sister who called him regarding his strange behavior recently. PMD stated all of this is new behavior within the last few weeks.  - urine lytes - K 23, Na 33, Cl 45  - thyroid panel - WNL (TSH 2.74, T3 100, T4 6.5)  - uric acid 2.4 - acute vs acute on chronic  - likely multifactorial - SSRI use and excess fluid intake    - Na currently 131 after 123 last night - after pt was fluid restricted   - Renal Consulted, Dr. Burnham - Improving sodium levels. PO fluid restriction. Will follow lytes and RF trend. Avoid hypotonic fluids.   - per psych Dr. Garcia at [facility] - pt was on Celexa and stopped, transitioned to effexor x1 dose which was stopped 2/2 to hyponatremia  - Spoke with PMD Dr Waldo Farias who stated pt has been altered as per his sister who called him regarding his strange behavior recently. PMD stated all of this is new behavior within the last few weeks.  - urine lytes - K 23, Na 33, Cl 45  - thyroid panel - WNL (TSH 2.74, T3 100, T4 6.5)  - uric acid 2.4

## 2017-04-27 NOTE — PROGRESS NOTE ADULT - SUBJECTIVE AND OBJECTIVE BOX
HPI:  76-year-old male with past medical history of major depression disorder, right inguinal hernia, prostate cancer, CLL, anxiety, status post cataract surgery, presented to ED from [facility] with an abnormal lab value – [id number]. Pt was recently seen and treated at [facility] for major depressive disorder and hyponatremia, was discharged to [facility] for further treatment. Pt has been feeling increased weakness, some slight confusion earlier, which resolved.  Patient stateed that he's been feeling more cold lately. Denies suicidal ideation/homicidal ideation.  Spoke with psychologist, Dr. Garcia, ([phone]) who confirmed pt is not currently on any SSRI as pt is hyponatremic. Na is currently ***************** from 123 last night. Pt was spoken to yesterday regarding not drinking too much water - limited to 1000ml a day. Was called by nurse after speaking to pt who was confused saying he needs to drink as much as possible. Pt was re counseled to not drink too much, pt is possibly drinking excess water despite being told not to.    REVIEW OF SYSTEMS:    CONSTITUTIONAL: No weakness, fevers or chills  EYES/ENT: + visual problems in both right and left eye, recent left eye cataract surgery, no throat pain   RESPIRATORY: No cough, wheezing, hemoptysis; No shortness of breath  CARDIOVASCULAR: No chest pain or palpitations  GASTROINTESTINAL: No abdominal, nausea, vomiting, or hematemesis; No diarrhea or constipation. No melena or hematochezia.  GENITOURINARY: No dysuria, frequency or hematuria  NEUROLOGICAL: No dizziness, numbness, or weakness  SKIN: No itching, burning, rashes, or lesions   All other review of systems is negative unless indicated above.    Vital Signs Last 24 Hrs  T(C): 36.6, Max: 37.3 (- @ 13:49)  T(F): 97.8, Max: 99.2 (- @ 13:49)  HR: 72 (72 - 83)  BP: 114/73 (113/61 - 125/89)  BP(mean): --  RR: 16 (16 - 18)  SpO2: 97% (96% - 98%)    PHYSICAL EXAM:     GENERAL: no acute distress  HEENT: NC/AT, EOMI, neck supple, MMM  RESPIRATORY: LCTAB/L, no rhonchi, rales, or wheezing  CARDIOVASCULAR: RRR, no murmurs, gallops, rubs  ABDOMINAL: soft, non-tender, non-distended, positive bowel sounds   EXTREMITIES: no clubbing, cyanosis, or edema  NEUROLOGICAL: alert and oriented x 3, non-focal  SKIN: no rashes or lesions   MUSCULOSKELETAL: no gross joint deformity    LABS:              ******************                   Color: Pale Yellow / Appearance: Clear / S.005 / pH: x  Gluc: x / Ketone: Negative  / Bili: Negative / Urobili: Negative   Blood: x / Protein: Negative / Nitrite: Negative   Leuk Esterase: Negative / RBC: x / WBC x   Sq Epi: x / Non Sq Epi: x / Bacteria: x    CAPILLARY BLOOD GLUCOSE    MEDICATIONS  (STANDING):  finasteride 5milliGRAM(s) Oral daily  tamsulosin 0.4milliGRAM(s) Oral at bedtime  gabapentin 300milliGRAM(s) Oral three times a day  enoxaparin Injectable 40milliGRAM(s) SubCutaneous every 24 hours  docusate sodium 100milliGRAM(s) Oral three times a day    MEDICATIONS  (PRN):  senna 2Tablet(s) Oral at bedtime PRN Constipation  LORazepam     Tablet 0.5milliGRAM(s) Oral every 6 hours PRN Anxiety HPI:  76-year-old male with past medical history of major depression disorder, right inguinal hernia, prostate cancer, CLL, anxiety, status post cataract surgery, presented to ED from [facility] with an abnormal lab value – [id number]. Pt was recently seen and treated at [facility] for major depressive disorder and hyponatremia, was discharged to [facility] for further treatment. Pt has been feeling increased weakness, some slight confusion earlier, which resolved.  Patient stateed that he's been feeling more cold lately. Denies suicidal ideation/homicidal ideation.  Spoke with psychologist, Dr. Garcia, ([phone]) who confirmed pt is not currently on any SSRI as pt is hyponatremic. Na is currently 131 from 123 last night after fluid restriction. Pt was spoken to yesterday regarding not drinking too much water - limited to 1000ml a day. Was called by nurse after speaking to pt who was confused saying he needs to drink as much as possible. Pt was re counseled to not drink too much, pt is possibly drinking excess water despite being told not to. Pt now understands to drink less. Pt had no events overnight.    REVIEW OF SYSTEMS:    CONSTITUTIONAL: No weakness, fevers or chills  EYES/ENT: + visual problems in both right and left eye, recent left eye cataract surgery, no throat pain   RESPIRATORY: No cough, wheezing, hemoptysis; No shortness of breath  CARDIOVASCULAR: No chest pain or palpitations  GASTROINTESTINAL: No abdominal, nausea, vomiting, or hematemesis; No diarrhea or constipation. No melena or hematochezia.  GENITOURINARY: No dysuria, frequency or hematuria  NEUROLOGICAL: No dizziness, numbness, or weakness  SKIN: No itching, burning, rashes, or lesions   All other review of systems is negative unless indicated above.    Vital Signs Last 24 Hrs  T(C): 36.6, Max: 37.3 (- @ 13:49)  T(F): 97.8, Max: 99.2 (- @ 13:49)  HR: 72 (72 - 83)  BP: 114/73 (113/61 - 125/89)  BP(mean): --  RR: 16 (16 - 18)  SpO2: 97% (96% - 98%)    PHYSICAL EXAM:     GENERAL: no acute distress  HEENT: NC/AT, EOMI, neck supple, MMM  RESPIRATORY: LCTAB/L, no rhonchi, rales, or wheezing  CARDIOVASCULAR: RRR, no murmurs, gallops, rubs  ABDOMINAL: soft, non-tender, non-distended, positive bowel sounds   EXTREMITIES: no clubbing, cyanosis, or edema  NEUROLOGICAL: alert and oriented x 3, non-focal  SKIN: no rashes or lesions   MUSCULOSKELETAL: no gross joint deformity    LABS:                                   13.8   17.4  )-----------( 250      ( 2017 08:53 )             39.6     04    131<L>  |  93<L>  |  10  ----------------------------<  127<H>  4.3   |  31  |  0.63    Ca    8.8      2017 08:53        Urinalysis Basic - ( 2017 09:58 )    Color: Pale Yellow / Appearance: Clear / S.005 / pH: x  Gluc: x / Ketone: Negative  / Bili: Negative / Urobili: Negative   Blood: x / Protein: Negative / Nitrite: Negative   Leuk Esterase: Negative / RBC: x / WBC x   Sq Epi: x / Non Sq Epi: x / Bacteria: x      MEDICATIONS  (STANDING):  finasteride 5milliGRAM(s) Oral daily  tamsulosin 0.4milliGRAM(s) Oral at bedtime  gabapentin 300milliGRAM(s) Oral three times a day  enoxaparin Injectable 40milliGRAM(s) SubCutaneous every 24 hours  docusate sodium 100milliGRAM(s) Oral three times a day  sodium chloride 1Gram(s) Oral once    MEDICATIONS  (PRN):  senna 2Tablet(s) Oral at bedtime PRN Constipation  LORazepam     Tablet 0.5milliGRAM(s) Oral every 6 hours PRN Anxiety    CXR - No acute pulmonary process demonstrated.

## 2017-04-27 NOTE — PROGRESS NOTE ADULT - PROBLEM SELECTOR PLAN 7
lovenox sq pt currently complaining of issues with left eye where surgery was  - Spoke with supervisor at Dr. Milner's office ([phone]), ophthalmologist, who stated pt had left cataract surgery on March 20th 2017 and had no complications. Pt went back on March 28th for a follow up and everything looked fine and pt had no complaints at that time. Pt needed to be on prednisolone drops for 3 weeks and now no longer needs eye drops. They recommend pt to follow up there after discharge for any complaints.

## 2017-04-27 NOTE — PROGRESS NOTE ADULT - PROBLEM SELECTOR PLAN 3
continue with ativan PRN  continue with gabapentin, continue with ativan PRN  continue with gabapentin continue with gabapentin, Effexor d/c 2/2 hyponatremia  psych consulted, Dr. Portillo - no SSRI now, pt to go back to [facility] for anxiety and depression

## 2017-04-27 NOTE — PROGRESS NOTE ADULT - PROBLEM SELECTOR PLAN 2
continue with gabapentin, Effexor d/c 2/2 hyponatremia  f/u psych consult- Dr. Portillo continue with gabapentin, Effexor d/c 2/2 hyponatremia  psych consulted, Dr. Portillo - no SSRI now, pt to go back to [facility] for anxiety and depression WBC 17.4  likely reactive  procalcitonin .05, no fevers no signs of infection, blood and urine cultures negative

## 2017-04-27 NOTE — PROGRESS NOTE ADULT - SUBJECTIVE AND OBJECTIVE BOX
Patient seen in follow up for hyponatremia. Sodium levels better today. ? Compliance with PO fluid restriction.     PAST MEDICAL HISTORY:  Cataracts, bilateral  Anxiety  Chronic leukemia  Prostate cancer  Hernia, inguinal, right  Depression  Anxiety  Depression  CLL (chronic lymphocytic leukemia)  Hydrocele  Varicocele  Cataract  Umbilical hernia  Prostate cancer    MEDICATIONS  (STANDING):  finasteride 5milliGRAM(s) Oral daily  tamsulosin 0.4milliGRAM(s) Oral at bedtime  gabapentin 300milliGRAM(s) Oral three times a day  enoxaparin Injectable 40milliGRAM(s) SubCutaneous every 24 hours  docusate sodium 100milliGRAM(s) Oral three times a day    MEDICATIONS  (PRN):  senna 2Tablet(s) Oral at bedtime PRN Constipation  LORazepam     Tablet 0.5milliGRAM(s) Oral every 6 hours PRN Anxiety    T(C): 36.6, Max: 37.3 (- @ 13:49)  HR: 72 (70 - 83)  BP: 114/73 (99/63 - 125/89)  RR: 16 (16 - 18)  SpO2: 97% (96% - 98%)  Wt(kg): --  I&O's Detail  I & Os for 24h ending 2017 07:00  =============================================  IN:    Total IN: 0 ml  ---------------------------------------------  OUT:    Voided: 1150 ml    Total OUT: 1150 ml  ---------------------------------------------  Total NET: -1150 ml    I & Os for current day (as of 2017 10:04)  =============================================  IN:    Oral Fluid: 120 ml    Total IN: 120 ml  ---------------------------------------------  OUT:    Voided: 500 ml    Total OUT: 500 ml  ---------------------------------------------  Total NET: -380 ml      PHYSICAL EXAM:  General: NAD  Respiratory: b/l air entry  Cardiovascular: S1 S2  Gastrointestinal: soft  Extremities:  no edema                          13.8   17.4  )-----------( 250      ( 2017 08:53 )             39.6         131<L>  |  93<L>  |  10  ----------------------------<  127<H>  4.3   |  31  |  0.63    Ca    8.8      2017 08:53            Urinalysis Basic - ( 2017 09:58 )    Color: Pale Yellow / Appearance: Clear / S.005 / pH: x  Gluc: x / Ketone: Negative  / Bili: Negative / Urobili: Negative   Blood: x / Protein: Negative / Nitrite: Negative   Leuk Esterase: Negative / RBC: x / WBC x   Sq Epi: x / Non Sq Epi: x / Bacteria: x      Sodium, Serum: 131 ( @ 08:53)  Sodium, Serum: 123 ( @ 16:58)  Sodium, Serum: 129 ( @ 08:24)  Sodium, Serum: 125 ( @ 20:03)    Creatinine, Serum: 0.63 ( @ 08:53)  Creatinine, Serum: 0.48 ( @ 08:24)  Creatinine, Serum: 0.69 ( @ 15:39)  Creatinine, Serum: 0.45 ( @ 08:19)  Creatinine, Serum: 0.49 ( @ 00:14)  Creatinine, Serum: 0.64 ( @ 18:26)    Potassium, Serum: 4.3 ( @ 08:53)  Potassium, Serum: 4.0 ( @ 08:24)  Potassium, Serum: 4.3 ( @ 15:39)  Potassium, Serum: 4.1 ( @ 08:19)    Hemoglobin: 13.8 ( @ 08:53)  Hemoglobin: 13.1 ( @ 08:24)  Hemoglobin: 12.8 ( @ 08:19)  Hemoglobin: 13.2 ( @ 18:26)

## 2017-04-28 VITALS
HEART RATE: 77 BPM | SYSTOLIC BLOOD PRESSURE: 104 MMHG | DIASTOLIC BLOOD PRESSURE: 64 MMHG | OXYGEN SATURATION: 97 % | RESPIRATION RATE: 16 BRPM | TEMPERATURE: 98 F

## 2017-04-28 DIAGNOSIS — K40.90 UNILATERAL INGUINAL HERNIA, WITHOUT OBSTRUCTION OR GANGRENE, NOT SPECIFIED AS RECURRENT: ICD-10-CM

## 2017-04-28 LAB
ANION GAP SERPL CALC-SCNC: 5 MMOL/L — SIGNIFICANT CHANGE UP (ref 5–17)
BUN SERPL-MCNC: 13 MG/DL — SIGNIFICANT CHANGE UP (ref 7–23)
CALCIUM SERPL-MCNC: 9.2 MG/DL — SIGNIFICANT CHANGE UP (ref 8.5–10.1)
CHLORIDE SERPL-SCNC: 95 MMOL/L — LOW (ref 96–108)
CO2 SERPL-SCNC: 32 MMOL/L — HIGH (ref 22–31)
CREAT SERPL-MCNC: 0.65 MG/DL — SIGNIFICANT CHANGE UP (ref 0.5–1.3)
GLUCOSE SERPL-MCNC: 84 MG/DL — SIGNIFICANT CHANGE UP (ref 70–99)
HCT VFR BLD CALC: 39.6 % — SIGNIFICANT CHANGE UP (ref 39–50)
HGB BLD-MCNC: 13.4 G/DL — SIGNIFICANT CHANGE UP (ref 13–17)
MCHC RBC-ENTMCNC: 34 GM/DL — SIGNIFICANT CHANGE UP (ref 32–36)
MCHC RBC-ENTMCNC: 34.7 PG — HIGH (ref 27–34)
MCV RBC AUTO: 102 FL — HIGH (ref 80–100)
PLATELET # BLD AUTO: 248 K/UL — SIGNIFICANT CHANGE UP (ref 150–400)
POTASSIUM SERPL-MCNC: 4.6 MMOL/L — SIGNIFICANT CHANGE UP (ref 3.5–5.3)
POTASSIUM SERPL-SCNC: 4.6 MMOL/L — SIGNIFICANT CHANGE UP (ref 3.5–5.3)
RBC # BLD: 3.88 M/UL — LOW (ref 4.2–5.8)
RBC # FLD: 11.4 % — SIGNIFICANT CHANGE UP (ref 10.3–14.5)
SODIUM SERPL-SCNC: 132 MMOL/L — LOW (ref 135–145)
WBC # BLD: 13.8 K/UL — HIGH (ref 3.8–10.5)
WBC # FLD AUTO: 13.8 K/UL — HIGH (ref 3.8–10.5)

## 2017-04-28 PROCEDURE — 80048 BASIC METABOLIC PNL TOTAL CA: CPT

## 2017-04-28 PROCEDURE — 85027 COMPLETE CBC AUTOMATED: CPT

## 2017-04-28 PROCEDURE — 83605 ASSAY OF LACTIC ACID: CPT

## 2017-04-28 PROCEDURE — 82436 ASSAY OF URINE CHLORIDE: CPT

## 2017-04-28 PROCEDURE — 87086 URINE CULTURE/COLONY COUNT: CPT

## 2017-04-28 PROCEDURE — 84480 ASSAY TRIIODOTHYRONINE (T3): CPT

## 2017-04-28 PROCEDURE — 87040 BLOOD CULTURE FOR BACTERIA: CPT

## 2017-04-28 PROCEDURE — 97161 PT EVAL LOW COMPLEX 20 MIN: CPT

## 2017-04-28 PROCEDURE — 81001 URINALYSIS AUTO W/SCOPE: CPT

## 2017-04-28 PROCEDURE — 99239 HOSP IP/OBS DSCHRG MGMT >30: CPT

## 2017-04-28 PROCEDURE — 83735 ASSAY OF MAGNESIUM: CPT

## 2017-04-28 PROCEDURE — 97530 THERAPEUTIC ACTIVITIES: CPT

## 2017-04-28 PROCEDURE — 83036 HEMOGLOBIN GLYCOSYLATED A1C: CPT

## 2017-04-28 PROCEDURE — 84550 ASSAY OF BLOOD/URIC ACID: CPT

## 2017-04-28 PROCEDURE — 84295 ASSAY OF SERUM SODIUM: CPT

## 2017-04-28 PROCEDURE — 84300 ASSAY OF URINE SODIUM: CPT

## 2017-04-28 PROCEDURE — 85610 PROTHROMBIN TIME: CPT

## 2017-04-28 PROCEDURE — 99285 EMERGENCY DEPT VISIT HI MDM: CPT | Mod: 25

## 2017-04-28 PROCEDURE — 84443 ASSAY THYROID STIM HORMONE: CPT

## 2017-04-28 PROCEDURE — 85730 THROMBOPLASTIN TIME PARTIAL: CPT

## 2017-04-28 PROCEDURE — 81003 URINALYSIS AUTO W/O SCOPE: CPT

## 2017-04-28 PROCEDURE — 93005 ELECTROCARDIOGRAM TRACING: CPT

## 2017-04-28 PROCEDURE — 80053 COMPREHEN METABOLIC PANEL: CPT

## 2017-04-28 PROCEDURE — 71045 X-RAY EXAM CHEST 1 VIEW: CPT

## 2017-04-28 PROCEDURE — 97116 GAIT TRAINING THERAPY: CPT

## 2017-04-28 PROCEDURE — 84436 ASSAY OF TOTAL THYROXINE: CPT

## 2017-04-28 PROCEDURE — 84100 ASSAY OF PHOSPHORUS: CPT

## 2017-04-28 PROCEDURE — 84145 PROCALCITONIN (PCT): CPT

## 2017-04-28 PROCEDURE — 84133 ASSAY OF URINE POTASSIUM: CPT

## 2017-04-28 RX ORDER — SENNA PLUS 8.6 MG/1
2 TABLET ORAL
Qty: 0 | Refills: 0 | COMMUNITY
Start: 2017-04-28

## 2017-04-28 RX ADMIN — Medication 100 MILLIGRAM(S): at 05:27

## 2017-04-28 RX ADMIN — GABAPENTIN 300 MILLIGRAM(S): 400 CAPSULE ORAL at 13:12

## 2017-04-28 RX ADMIN — Medication 0.5 MILLIGRAM(S): at 13:12

## 2017-04-28 RX ADMIN — GABAPENTIN 300 MILLIGRAM(S): 400 CAPSULE ORAL at 05:27

## 2017-04-28 RX ADMIN — FINASTERIDE 5 MILLIGRAM(S): 5 TABLET, FILM COATED ORAL at 12:28

## 2017-04-28 RX ADMIN — Medication 100 MILLIGRAM(S): at 13:12

## 2017-04-28 NOTE — PROGRESS NOTE ADULT - PROBLEM SELECTOR PLAN 1
- acute vs acute on chronic  - likely multifactorial - SSRI use and excess fluid intake    - Na currently 132 after 131 yesterday - after pt was fluid restricted   - Renal Consulted, Dr. Burnham - Improving sodium levels. PO fluid restriction. Will follow lytes and RF trend. Avoid hypotonic fluids.   - per psych Dr. Garcia at [facility] - pt was on Celexa and stopped, transitioned to effexor x1 dose which was stopped 2/2 to hyponatremia  - Spoke with PMD Dr Waldo Farias who stated pt has been altered as per his sister who called him regarding his strange behavior recently. PMD stated all of this is new behavior within the last few weeks.  - urine lytes - K 23, Na 33, Cl 45  - thyroid panel - WNL (TSH 2.74, T3 100, T4 6.5)  - uric acid 2.4

## 2017-04-28 NOTE — PROGRESS NOTE ADULT - PROBLEM SELECTOR PLAN 7
pt currently complaining of issues with left eye where surgery was  - Spoke with supervisor at Dr. Milner's office ([phone]), ophthalmologist, who stated pt had left cataract surgery on March 20th 2017 and had no complications. Pt went back on March 28th for a follow up and everything looked fine and pt had no complaints at that time. Pt needed to be on prednisolone drops for 3 weeks and now no longer needs eye drops. They recommend pt to follow up there after discharge for any complaints.

## 2017-04-28 NOTE — PROGRESS NOTE ADULT - SUBJECTIVE AND OBJECTIVE BOX
Patient seen in follow up for hyponatremia. Sodium levels better today.     PAST MEDICAL HISTORY:  Cataracts, bilateral  Anxiety  Chronic leukemia  Prostate cancer  Hernia, inguinal, right  Depression  Anxiety  Depression  CLL (chronic lymphocytic leukemia)  Hydrocele  Varicocele  Cataract  Umbilical hernia  Prostate cancer    MEDICATIONS  (STANDING):  finasteride 5milliGRAM(s) Oral daily  tamsulosin 0.4milliGRAM(s) Oral at bedtime  gabapentin 300milliGRAM(s) Oral three times a day  enoxaparin Injectable 40milliGRAM(s) SubCutaneous every 24 hours  docusate sodium 100milliGRAM(s) Oral three times a day    MEDICATIONS  (PRN):  senna 2Tablet(s) Oral at bedtime PRN Constipation  LORazepam     Tablet 0.5milliGRAM(s) Oral every 6 hours PRN Anxiety    T(C): 36.4, Max: 37.3 (04-26 @ 13:49)  HR: 68 (68 - 96)  BP: 156/60 (109/67 - 156/60)  RR: 16  SpO2: 96%  Wt(kg): --  I&O's Detail  I & Os for 24h ending 28 Apr 2017 07:00  =============================================  IN:    Oral Fluid: 360 ml    Total IN: 360 ml  ---------------------------------------------  OUT:    Voided: 1600 ml    Total OUT: 1600 ml  ---------------------------------------------  Total NET: -1240 ml    I & Os for current day (as of 28 Apr 2017 10:35)  =============================================  IN:    Oral Fluid: 200 ml    Total IN: 200 ml  ---------------------------------------------  OUT:    Total OUT: 0 ml  ---------------------------------------------  Total NET: 200 ml      PHYSICAL EXAM:  General: NAD  Respiratory: b/l air entry  Cardiovascular: S1 S2  Gastrointestinal: soft  Extremities:  no edema               LABORATORY:                          13.4   13.8  )-----------( 248      ( 28 Apr 2017 08:31 )             39.6     04-28    132<L>  |  95<L>  |  13  ----------------------------<  84  4.6   |  32<H>  |  0.65    Ca    9.2      28 Apr 2017 08:31          Sodium, Serum: 132 mmol/L (04-28 @ 08:31)  Sodium, Serum: 131 mmol/L (04-27 @ 08:53)  Sodium, Serum: 123 mmol/L (04-26 @ 16:58)    Potassium, Serum: 4.6 mmol/L (04-28 @ 08:31)  Potassium, Serum: 4.3 mmol/L (04-27 @ 08:53)    Hemoglobin: 13.4 g/dL (04-28 @ 08:31)  Hemoglobin: 13.8 g/dL (04-27 @ 08:53)  Hemoglobin: 13.1 g/dL (04-26 @ 08:24)    Creatinine, Serum: 9404-28 @ 08:31  Creatinine, Serum: 9604-27 @ 08:53

## 2017-04-28 NOTE — PROGRESS NOTE ADULT - PROBLEM SELECTOR PLAN 3
continue with gabapentin, Effexor d/c 2/2 hyponatremia  psych consulted, Dr. Portillo - no SSRI now, pt to go back to [facility] for anxiety and depression

## 2017-04-28 NOTE — PROGRESS NOTE ADULT - ASSESSMENT
76-year-old male with past medical history of major depression disorder, right inguinal hernia, prostate cancer, CLL, anxiety, status post cataract surgery, presents to ED from [facility] with an abnormal lab value – [id number], admitted with hyponatremia.

## 2017-04-28 NOTE — PROGRESS NOTE ADULT - PROBLEM SELECTOR PLAN 2
resolving - WBC 13.8   likely reactive  procalcitonin .05, no fevers no signs of infection, blood and urine cultures negative

## 2017-04-28 NOTE — PROGRESS NOTE ADULT - SUBJECTIVE AND OBJECTIVE BOX
HPI:  76-year-old male with past medical history of major depression disorder, right inguinal hernia, prostate cancer, CLL, anxiety, status post cataract surgery, presented to ED from [facility] with an abnormal lab value – [id number]. Pt was recently seen and treated at [facility] for major depressive disorder and hyponatremia, was discharged to [facility] for further treatment. Pt has been feeling increased weakness, some slight confusion earlier, which resolved.  Patient stateed that he's been feeling more cold lately. Denies suicidal ideation/homicidal ideation.  Spoke with psychologist, Dr. Garcia, ([phone]) who confirmed pt is not currently on any SSRI as pt is hyponatremic. Na is currently 132 from 131 last night after fluid restriction. Pt was spoken to regarding not drinking too much water - limited to 1000ml a day. Pt was re counseled to not drink too much, pt is possibly drinking excess water despite being told not to. Pt now understands to drink less. Pt had no events overnight. Pt was concerned about his stable hernia in that he felt uncomfortable walking without support. Pt was given a special scrotal support to wear to help support when he walks.    REVIEW OF SYSTEMS:    CONSTITUTIONAL: No weakness, fevers or chills  EYES/ENT: + visual problems in both right and left eye, recent left eye cataract surgery, no throat pain   RESPIRATORY: No cough, wheezing, hemoptysis; No shortness of breath  CARDIOVASCULAR: No chest pain or palpitations  GASTROINTESTINAL: No abdominal, nausea, vomiting, or hematemesis; No diarrhea or constipation. No melena or hematochezia.  GENITOURINARY: No dysuria, frequency or hematuria  NEUROLOGICAL: No dizziness, numbness, or weakness  SKIN: No itching, burning, rashes, or lesions   All other review of systems is negative unless indicated above.    Vital Signs Last 24 Hrs  T(C): 36.4, Max: 36.9 ( @ 22:37)  T(F): 97.6, Max: 98.4 ( @ 22:37)  HR: 68 (68 - 96)  BP: 156/60 (109/67 - 156/60)  BP(mean): --  RR: 16 (16 - 16)  SpO2: 96% (96% - 98%)    PHYSICAL EXAM:     GENERAL: no acute distress  HEENT: NC/AT, EOMI, neck supple, MMM  RESPIRATORY: LCTAB/L, no rhonchi, rales, or wheezing  CARDIOVASCULAR: RRR, no murmurs, gallops, rubs  ABDOMINAL: soft, non-tender, non-distended, positive bowel sounds   EXTREMITIES: no clubbing, cyanosis, or edema  NEUROLOGICAL: alert and oriented x 3, non-focal  SKIN: no rashes or lesions   MUSCULOSKELETAL: no gross joint deformity    LABS:                                      13.4   13.8  )-----------( 248      ( 2017 08:31 )             39.6     04-    132<L>  |  95<L>  |  13  ----------------------------<  84  4.6   |  32<H>  |  0.65    Ca    9.2      2017 08:31      Color: Pale Yellow / Appearance: Clear / S.005 / pH: x  Gluc: x / Ketone: Negative  / Bili: Negative / Urobili: Negative   Blood: x / Protein: Negative / Nitrite: Negative   Leuk Esterase: Negative / RBC: x / WBC x   Sq Epi: x / Non Sq Epi: x / Bacteria: x    MEDICATIONS  (STANDING):  finasteride 5milliGRAM(s) Oral daily  tamsulosin 0.4milliGRAM(s) Oral at bedtime  gabapentin 300milliGRAM(s) Oral three times a day  enoxaparin Injectable 40milliGRAM(s) SubCutaneous every 24 hours  docusate sodium 100milliGRAM(s) Oral three times a day    MEDICATIONS  (PRN):  senna 2Tablet(s) Oral at bedtime PRN Constipation  LORazepam     Tablet 0.5milliGRAM(s) Oral every 6 hours PRN Anxiety      CXR - No acute pulmonary process demonstrated.

## 2017-04-28 NOTE — PROGRESS NOTE ADULT - PROBLEM SELECTOR PLAN 1
Improving sodium levels. PO fluid restriction. Will follow lytes and RF trend. Avoid hypotonic fluids. Pt advised on compliance with PO fluid restriction.

## 2017-04-30 LAB
CULTURE RESULTS: SIGNIFICANT CHANGE UP
CULTURE RESULTS: SIGNIFICANT CHANGE UP
SPECIMEN SOURCE: SIGNIFICANT CHANGE UP
SPECIMEN SOURCE: SIGNIFICANT CHANGE UP

## 2017-05-02 ENCOUNTER — APPOINTMENT (OUTPATIENT)
Dept: SURGERY | Facility: CLINIC | Age: 77
End: 2017-05-02

## 2017-05-03 DIAGNOSIS — E87.1 HYPO-OSMOLALITY AND HYPONATREMIA: ICD-10-CM

## 2017-05-03 DIAGNOSIS — F42.9 OBSESSIVE-COMPULSIVE DISORDER, UNSPECIFIED: ICD-10-CM

## 2017-05-03 DIAGNOSIS — C61 MALIGNANT NEOPLASM OF PROSTATE: ICD-10-CM

## 2017-05-03 DIAGNOSIS — Z66 DO NOT RESUSCITATE: ICD-10-CM

## 2017-05-03 DIAGNOSIS — C91.10 CHRONIC LYMPHOCYTIC LEUKEMIA OF B-CELL TYPE NOT HAVING ACHIEVED REMISSION: ICD-10-CM

## 2017-05-03 DIAGNOSIS — H26.9 UNSPECIFIED CATARACT: ICD-10-CM

## 2017-05-03 DIAGNOSIS — K40.90 UNILATERAL INGUINAL HERNIA, WITHOUT OBSTRUCTION OR GANGRENE, NOT SPECIFIED AS RECURRENT: ICD-10-CM

## 2017-05-03 DIAGNOSIS — F43.23 ADJUSTMENT DISORDER WITH MIXED ANXIETY AND DEPRESSED MOOD: ICD-10-CM

## 2017-05-08 PROBLEM — F32.9 MAJOR DEPRESSIVE DISORDER, SINGLE EPISODE, UNSPECIFIED: Chronic | Status: ACTIVE | Noted: 2017-04-24

## 2017-05-08 PROBLEM — C61 MALIGNANT NEOPLASM OF PROSTATE: Chronic | Status: ACTIVE | Noted: 2017-04-24

## 2017-05-08 PROBLEM — K40.90 UNILATERAL INGUINAL HERNIA, WITHOUT OBSTRUCTION OR GANGRENE, NOT SPECIFIED AS RECURRENT: Chronic | Status: ACTIVE | Noted: 2017-04-24

## 2017-05-08 PROBLEM — C95.10 CHRONIC LEUKEMIA OF UNSPECIFIED CELL TYPE NOT HAVING ACHIEVED REMISSION: Chronic | Status: ACTIVE | Noted: 2017-04-24

## 2017-05-09 ENCOUNTER — OUTPATIENT (OUTPATIENT)
Dept: OUTPATIENT SERVICES | Facility: HOSPITAL | Age: 77
LOS: 1 days | Discharge: HOME | End: 2017-05-09
Payer: MEDICARE

## 2017-05-09 DIAGNOSIS — Z98.49 CATARACT EXTRACTION STATUS, UNSPECIFIED EYE: Chronic | ICD-10-CM

## 2017-05-10 DIAGNOSIS — F32.3 MAJOR DEPRESSIVE DISORDER, SINGLE EPISODE, SEVERE WITH PSYCHOTIC FEATURES: ICD-10-CM

## 2017-05-26 ENCOUNTER — INPATIENT (INPATIENT)
Facility: HOSPITAL | Age: 77
LOS: 65 days | Discharge: HOME | End: 2017-07-31
Attending: PSYCHIATRY & NEUROLOGY | Admitting: PSYCHIATRY & NEUROLOGY
Payer: MEDICARE

## 2017-05-26 VITALS — TEMPERATURE: 97 F

## 2017-05-26 DIAGNOSIS — F33.9 MAJOR DEPRESSIVE DISORDER, RECURRENT, UNSPECIFIED: ICD-10-CM

## 2017-05-26 DIAGNOSIS — Z98.49 CATARACT EXTRACTION STATUS, UNSPECIFIED EYE: Chronic | ICD-10-CM

## 2017-05-26 PROCEDURE — 93010 ELECTROCARDIOGRAM REPORT: CPT

## 2017-05-26 PROCEDURE — 99232 SBSQ HOSP IP/OBS MODERATE 35: CPT

## 2017-05-26 RX ORDER — GABAPENTIN 400 MG/1
300 CAPSULE ORAL THREE TIMES A DAY
Qty: 0 | Refills: 0 | Status: DISCONTINUED | OUTPATIENT
Start: 2017-05-26 | End: 2017-06-25

## 2017-05-26 RX ORDER — TAMSULOSIN HYDROCHLORIDE 0.4 MG/1
0.4 CAPSULE ORAL AT BEDTIME
Qty: 0 | Refills: 0 | Status: DISCONTINUED | OUTPATIENT
Start: 2017-05-26 | End: 2017-06-25

## 2017-05-26 RX ORDER — FINASTERIDE 5 MG/1
5 TABLET, FILM COATED ORAL DAILY
Qty: 0 | Refills: 0 | Status: DISCONTINUED | OUTPATIENT
Start: 2017-05-26 | End: 2017-06-25

## 2017-05-26 RX ORDER — RISPERIDONE 4 MG/1
0.5 TABLET ORAL
Qty: 0 | Refills: 0 | Status: DISCONTINUED | OUTPATIENT
Start: 2017-05-26 | End: 2017-05-27

## 2017-05-26 RX ADMIN — TAMSULOSIN HYDROCHLORIDE 0.4 MILLIGRAM(S): 0.4 CAPSULE ORAL at 22:09

## 2017-05-26 RX ADMIN — GABAPENTIN 300 MILLIGRAM(S): 400 CAPSULE ORAL at 16:02

## 2017-05-26 RX ADMIN — RISPERIDONE 0.5 MILLIGRAM(S): 4 TABLET ORAL at 22:09

## 2017-05-26 RX ADMIN — GABAPENTIN 300 MILLIGRAM(S): 400 CAPSULE ORAL at 22:09

## 2017-05-27 LAB
ALBUMIN SERPL ELPH-MCNC: 4 G/DL — SIGNIFICANT CHANGE UP (ref 3.3–5)
ALP SERPL-CCNC: 55 U/L — SIGNIFICANT CHANGE UP (ref 40–120)
ALT FLD-CCNC: 15 U/L — SIGNIFICANT CHANGE UP (ref 4–41)
AST SERPL-CCNC: 19 U/L — SIGNIFICANT CHANGE UP (ref 4–40)
BILIRUB SERPL-MCNC: 0.5 MG/DL — SIGNIFICANT CHANGE UP (ref 0.2–1.2)
BUN SERPL-MCNC: 11 MG/DL — SIGNIFICANT CHANGE UP (ref 7–23)
CALCIUM SERPL-MCNC: 9.5 MG/DL — SIGNIFICANT CHANGE UP (ref 8.4–10.5)
CHLORIDE SERPL-SCNC: 101 MMOL/L — SIGNIFICANT CHANGE UP (ref 98–107)
CHOLEST SERPL-MCNC: 148 MG/DL — SIGNIFICANT CHANGE UP (ref 120–199)
CO2 SERPL-SCNC: 28 MMOL/L — SIGNIFICANT CHANGE UP (ref 22–31)
CREAT SERPL-MCNC: 0.69 MG/DL — SIGNIFICANT CHANGE UP (ref 0.5–1.3)
GLUCOSE SERPL-MCNC: 81 MG/DL — SIGNIFICANT CHANGE UP (ref 70–99)
HCT VFR BLD CALC: 38.4 % — LOW (ref 39–50)
HDLC SERPL-MCNC: 67 MG/DL — HIGH (ref 35–55)
HGB BLD-MCNC: 12.3 G/DL — LOW (ref 13–17)
LIPID PNL WITH DIRECT LDL SERPL: 80 MG/DL — SIGNIFICANT CHANGE UP
MCHC RBC-ENTMCNC: 32 % — SIGNIFICANT CHANGE UP (ref 32–36)
MCHC RBC-ENTMCNC: 32.5 PG — SIGNIFICANT CHANGE UP (ref 27–34)
MCV RBC AUTO: 101.3 FL — HIGH (ref 80–100)
PLATELET # BLD AUTO: 212 K/UL — SIGNIFICANT CHANGE UP (ref 150–400)
PMV BLD: 10.3 FL — SIGNIFICANT CHANGE UP (ref 7–13)
POTASSIUM SERPL-MCNC: 4.3 MMOL/L — SIGNIFICANT CHANGE UP (ref 3.5–5.3)
POTASSIUM SERPL-SCNC: 4.3 MMOL/L — SIGNIFICANT CHANGE UP (ref 3.5–5.3)
PROT SERPL-MCNC: 6.3 G/DL — SIGNIFICANT CHANGE UP (ref 6–8.3)
RBC # BLD: 3.79 M/UL — LOW (ref 4.2–5.8)
RBC # FLD: 12.7 % — SIGNIFICANT CHANGE UP (ref 10.3–14.5)
SODIUM SERPL-SCNC: 140 MMOL/L — SIGNIFICANT CHANGE UP (ref 135–145)
TRIGL SERPL-MCNC: 43 MG/DL — SIGNIFICANT CHANGE UP (ref 10–149)
TSH SERPL-MCNC: 2.48 UIU/ML — SIGNIFICANT CHANGE UP (ref 0.27–4.2)
WBC # BLD: 10.62 K/UL — HIGH (ref 3.8–10.5)
WBC # FLD AUTO: 10.62 K/UL — HIGH (ref 3.8–10.5)

## 2017-05-27 PROCEDURE — 99232 SBSQ HOSP IP/OBS MODERATE 35: CPT

## 2017-05-27 RX ORDER — RISPERIDONE 4 MG/1
1 TABLET ORAL AT BEDTIME
Qty: 0 | Refills: 0 | Status: DISCONTINUED | OUTPATIENT
Start: 2017-05-27 | End: 2017-05-30

## 2017-05-27 RX ORDER — RISPERIDONE 4 MG/1
0.5 TABLET ORAL DAILY
Qty: 0 | Refills: 0 | Status: DISCONTINUED | OUTPATIENT
Start: 2017-05-27 | End: 2017-05-30

## 2017-05-27 RX ADMIN — RISPERIDONE 1 MILLIGRAM(S): 4 TABLET ORAL at 20:27

## 2017-05-27 RX ADMIN — GABAPENTIN 300 MILLIGRAM(S): 400 CAPSULE ORAL at 13:42

## 2017-05-27 RX ADMIN — RISPERIDONE 0.5 MILLIGRAM(S): 4 TABLET ORAL at 08:37

## 2017-05-27 RX ADMIN — GABAPENTIN 300 MILLIGRAM(S): 400 CAPSULE ORAL at 20:27

## 2017-05-27 RX ADMIN — TAMSULOSIN HYDROCHLORIDE 0.4 MILLIGRAM(S): 0.4 CAPSULE ORAL at 20:27

## 2017-05-27 RX ADMIN — GABAPENTIN 300 MILLIGRAM(S): 400 CAPSULE ORAL at 08:37

## 2017-05-27 RX ADMIN — FINASTERIDE 5 MILLIGRAM(S): 5 TABLET, FILM COATED ORAL at 08:37

## 2017-05-28 PROCEDURE — 99231 SBSQ HOSP IP/OBS SF/LOW 25: CPT

## 2017-05-28 RX ORDER — DOCUSATE SODIUM 100 MG
100 CAPSULE ORAL ONCE
Qty: 0 | Refills: 0 | Status: COMPLETED | OUTPATIENT
Start: 2017-05-28 | End: 2017-05-28

## 2017-05-28 RX ADMIN — RISPERIDONE 0.5 MILLIGRAM(S): 4 TABLET ORAL at 08:09

## 2017-05-28 RX ADMIN — GABAPENTIN 300 MILLIGRAM(S): 400 CAPSULE ORAL at 14:07

## 2017-05-28 RX ADMIN — GABAPENTIN 300 MILLIGRAM(S): 400 CAPSULE ORAL at 20:24

## 2017-05-28 RX ADMIN — FINASTERIDE 5 MILLIGRAM(S): 5 TABLET, FILM COATED ORAL at 08:09

## 2017-05-28 RX ADMIN — TAMSULOSIN HYDROCHLORIDE 0.4 MILLIGRAM(S): 0.4 CAPSULE ORAL at 20:24

## 2017-05-28 RX ADMIN — RISPERIDONE 1 MILLIGRAM(S): 4 TABLET ORAL at 20:24

## 2017-05-28 RX ADMIN — GABAPENTIN 300 MILLIGRAM(S): 400 CAPSULE ORAL at 08:09

## 2017-05-28 RX ADMIN — Medication 100 MILLIGRAM(S): at 14:07

## 2017-05-29 PROCEDURE — 99232 SBSQ HOSP IP/OBS MODERATE 35: CPT

## 2017-05-29 RX ADMIN — GABAPENTIN 300 MILLIGRAM(S): 400 CAPSULE ORAL at 09:44

## 2017-05-29 RX ADMIN — RISPERIDONE 0.5 MILLIGRAM(S): 4 TABLET ORAL at 09:44

## 2017-05-29 RX ADMIN — RISPERIDONE 1 MILLIGRAM(S): 4 TABLET ORAL at 20:58

## 2017-05-29 RX ADMIN — FINASTERIDE 5 MILLIGRAM(S): 5 TABLET, FILM COATED ORAL at 09:44

## 2017-05-29 RX ADMIN — TAMSULOSIN HYDROCHLORIDE 0.4 MILLIGRAM(S): 0.4 CAPSULE ORAL at 20:58

## 2017-05-29 RX ADMIN — GABAPENTIN 300 MILLIGRAM(S): 400 CAPSULE ORAL at 13:10

## 2017-05-29 RX ADMIN — GABAPENTIN 300 MILLIGRAM(S): 400 CAPSULE ORAL at 20:58

## 2017-05-30 PROCEDURE — 99232 SBSQ HOSP IP/OBS MODERATE 35: CPT | Mod: 25

## 2017-05-30 PROCEDURE — 90853 GROUP PSYCHOTHERAPY: CPT

## 2017-05-30 RX ORDER — RISPERIDONE 4 MG/1
1 TABLET ORAL
Qty: 0 | Refills: 0 | Status: DISCONTINUED | OUTPATIENT
Start: 2017-05-30 | End: 2017-06-02

## 2017-05-30 RX ADMIN — FINASTERIDE 5 MILLIGRAM(S): 5 TABLET, FILM COATED ORAL at 08:33

## 2017-05-30 RX ADMIN — GABAPENTIN 300 MILLIGRAM(S): 400 CAPSULE ORAL at 20:58

## 2017-05-30 RX ADMIN — RISPERIDONE 1 MILLIGRAM(S): 4 TABLET ORAL at 20:58

## 2017-05-30 RX ADMIN — GABAPENTIN 300 MILLIGRAM(S): 400 CAPSULE ORAL at 08:33

## 2017-05-30 RX ADMIN — RISPERIDONE 0.5 MILLIGRAM(S): 4 TABLET ORAL at 08:33

## 2017-05-30 RX ADMIN — TAMSULOSIN HYDROCHLORIDE 0.4 MILLIGRAM(S): 0.4 CAPSULE ORAL at 20:58

## 2017-05-30 RX ADMIN — GABAPENTIN 300 MILLIGRAM(S): 400 CAPSULE ORAL at 13:04

## 2017-05-31 PROCEDURE — 99232 SBSQ HOSP IP/OBS MODERATE 35: CPT

## 2017-05-31 RX ORDER — BUPROPION HYDROCHLORIDE 150 MG/1
150 TABLET, EXTENDED RELEASE ORAL DAILY
Qty: 0 | Refills: 0 | Status: DISCONTINUED | OUTPATIENT
Start: 2017-05-31 | End: 2017-06-05

## 2017-05-31 RX ADMIN — RISPERIDONE 1 MILLIGRAM(S): 4 TABLET ORAL at 09:31

## 2017-05-31 RX ADMIN — BUPROPION HYDROCHLORIDE 150 MILLIGRAM(S): 150 TABLET, EXTENDED RELEASE ORAL at 09:30

## 2017-05-31 RX ADMIN — RISPERIDONE 1 MILLIGRAM(S): 4 TABLET ORAL at 20:27

## 2017-05-31 RX ADMIN — TAMSULOSIN HYDROCHLORIDE 0.4 MILLIGRAM(S): 0.4 CAPSULE ORAL at 20:27

## 2017-05-31 RX ADMIN — GABAPENTIN 300 MILLIGRAM(S): 400 CAPSULE ORAL at 09:31

## 2017-05-31 RX ADMIN — GABAPENTIN 300 MILLIGRAM(S): 400 CAPSULE ORAL at 14:56

## 2017-05-31 RX ADMIN — FINASTERIDE 5 MILLIGRAM(S): 5 TABLET, FILM COATED ORAL at 09:30

## 2017-05-31 RX ADMIN — GABAPENTIN 300 MILLIGRAM(S): 400 CAPSULE ORAL at 20:27

## 2017-06-01 PROCEDURE — 99232 SBSQ HOSP IP/OBS MODERATE 35: CPT

## 2017-06-01 RX ADMIN — GABAPENTIN 300 MILLIGRAM(S): 400 CAPSULE ORAL at 20:41

## 2017-06-01 RX ADMIN — RISPERIDONE 1 MILLIGRAM(S): 4 TABLET ORAL at 20:41

## 2017-06-01 RX ADMIN — BUPROPION HYDROCHLORIDE 150 MILLIGRAM(S): 150 TABLET, EXTENDED RELEASE ORAL at 08:42

## 2017-06-01 RX ADMIN — GABAPENTIN 300 MILLIGRAM(S): 400 CAPSULE ORAL at 12:32

## 2017-06-01 RX ADMIN — GABAPENTIN 300 MILLIGRAM(S): 400 CAPSULE ORAL at 08:42

## 2017-06-01 RX ADMIN — RISPERIDONE 1 MILLIGRAM(S): 4 TABLET ORAL at 08:42

## 2017-06-01 RX ADMIN — TAMSULOSIN HYDROCHLORIDE 0.4 MILLIGRAM(S): 0.4 CAPSULE ORAL at 20:41

## 2017-06-01 RX ADMIN — FINASTERIDE 5 MILLIGRAM(S): 5 TABLET, FILM COATED ORAL at 08:42

## 2017-06-02 LAB
APPEARANCE UR: SIGNIFICANT CHANGE UP
BILIRUB UR-MCNC: NEGATIVE — SIGNIFICANT CHANGE UP
BLOOD UR QL VISUAL: NEGATIVE — SIGNIFICANT CHANGE UP
COLOR SPEC: SIGNIFICANT CHANGE UP
GLUCOSE UR-MCNC: NEGATIVE — SIGNIFICANT CHANGE UP
KETONES UR-MCNC: NEGATIVE — SIGNIFICANT CHANGE UP
LEUKOCYTE ESTERASE UR-ACNC: NEGATIVE — SIGNIFICANT CHANGE UP
MUCOUS THREADS # UR AUTO: SIGNIFICANT CHANGE UP
NITRITE UR-MCNC: NEGATIVE — SIGNIFICANT CHANGE UP
PH UR: 8 — SIGNIFICANT CHANGE UP (ref 4.6–8)
PROT UR-MCNC: 10 — SIGNIFICANT CHANGE UP
RBC CASTS # UR COMP ASSIST: HIGH (ref 0–?)
SP GR SPEC: 1.01 — SIGNIFICANT CHANGE UP (ref 1–1.03)
UROBILINOGEN FLD QL: NORMAL E.U. — SIGNIFICANT CHANGE UP (ref 0.1–0.2)
WBC UR QL: SIGNIFICANT CHANGE UP (ref 0–?)

## 2017-06-02 PROCEDURE — 99232 SBSQ HOSP IP/OBS MODERATE 35: CPT

## 2017-06-02 RX ORDER — RISPERIDONE 4 MG/1
1 TABLET ORAL DAILY
Qty: 0 | Refills: 0 | Status: DISCONTINUED | OUTPATIENT
Start: 2017-06-02 | End: 2017-06-14

## 2017-06-02 RX ORDER — RISPERIDONE 4 MG/1
2 TABLET ORAL AT BEDTIME
Qty: 0 | Refills: 0 | Status: DISCONTINUED | OUTPATIENT
Start: 2017-06-02 | End: 2017-06-07

## 2017-06-02 RX ADMIN — RISPERIDONE 1 MILLIGRAM(S): 4 TABLET ORAL at 08:56

## 2017-06-02 RX ADMIN — FINASTERIDE 5 MILLIGRAM(S): 5 TABLET, FILM COATED ORAL at 08:56

## 2017-06-02 RX ADMIN — RISPERIDONE 2 MILLIGRAM(S): 4 TABLET ORAL at 21:16

## 2017-06-02 RX ADMIN — GABAPENTIN 300 MILLIGRAM(S): 400 CAPSULE ORAL at 08:56

## 2017-06-02 RX ADMIN — GABAPENTIN 300 MILLIGRAM(S): 400 CAPSULE ORAL at 13:22

## 2017-06-02 RX ADMIN — BUPROPION HYDROCHLORIDE 150 MILLIGRAM(S): 150 TABLET, EXTENDED RELEASE ORAL at 08:56

## 2017-06-02 RX ADMIN — TAMSULOSIN HYDROCHLORIDE 0.4 MILLIGRAM(S): 0.4 CAPSULE ORAL at 21:16

## 2017-06-02 RX ADMIN — GABAPENTIN 300 MILLIGRAM(S): 400 CAPSULE ORAL at 21:16

## 2017-06-03 RX ADMIN — GABAPENTIN 300 MILLIGRAM(S): 400 CAPSULE ORAL at 09:09

## 2017-06-03 RX ADMIN — FINASTERIDE 5 MILLIGRAM(S): 5 TABLET, FILM COATED ORAL at 09:09

## 2017-06-03 RX ADMIN — RISPERIDONE 1 MILLIGRAM(S): 4 TABLET ORAL at 09:09

## 2017-06-03 RX ADMIN — GABAPENTIN 300 MILLIGRAM(S): 400 CAPSULE ORAL at 12:46

## 2017-06-03 RX ADMIN — RISPERIDONE 2 MILLIGRAM(S): 4 TABLET ORAL at 20:53

## 2017-06-03 RX ADMIN — BUPROPION HYDROCHLORIDE 150 MILLIGRAM(S): 150 TABLET, EXTENDED RELEASE ORAL at 09:09

## 2017-06-03 RX ADMIN — GABAPENTIN 300 MILLIGRAM(S): 400 CAPSULE ORAL at 20:53

## 2017-06-03 RX ADMIN — TAMSULOSIN HYDROCHLORIDE 0.4 MILLIGRAM(S): 0.4 CAPSULE ORAL at 20:53

## 2017-06-04 RX ADMIN — RISPERIDONE 2 MILLIGRAM(S): 4 TABLET ORAL at 20:21

## 2017-06-04 RX ADMIN — RISPERIDONE 1 MILLIGRAM(S): 4 TABLET ORAL at 09:30

## 2017-06-04 RX ADMIN — BUPROPION HYDROCHLORIDE 150 MILLIGRAM(S): 150 TABLET, EXTENDED RELEASE ORAL at 09:30

## 2017-06-04 RX ADMIN — GABAPENTIN 300 MILLIGRAM(S): 400 CAPSULE ORAL at 09:30

## 2017-06-04 RX ADMIN — GABAPENTIN 300 MILLIGRAM(S): 400 CAPSULE ORAL at 20:21

## 2017-06-04 RX ADMIN — TAMSULOSIN HYDROCHLORIDE 0.4 MILLIGRAM(S): 0.4 CAPSULE ORAL at 20:21

## 2017-06-04 RX ADMIN — GABAPENTIN 300 MILLIGRAM(S): 400 CAPSULE ORAL at 12:59

## 2017-06-04 RX ADMIN — FINASTERIDE 5 MILLIGRAM(S): 5 TABLET, FILM COATED ORAL at 09:30

## 2017-06-05 PROCEDURE — 99232 SBSQ HOSP IP/OBS MODERATE 35: CPT

## 2017-06-05 RX ORDER — BUPROPION HYDROCHLORIDE 150 MG/1
300 TABLET, EXTENDED RELEASE ORAL DAILY
Qty: 0 | Refills: 0 | Status: DISCONTINUED | OUTPATIENT
Start: 2017-06-05 | End: 2017-06-15

## 2017-06-05 RX ADMIN — GABAPENTIN 300 MILLIGRAM(S): 400 CAPSULE ORAL at 21:40

## 2017-06-05 RX ADMIN — RISPERIDONE 1 MILLIGRAM(S): 4 TABLET ORAL at 09:23

## 2017-06-05 RX ADMIN — RISPERIDONE 2 MILLIGRAM(S): 4 TABLET ORAL at 21:40

## 2017-06-05 RX ADMIN — GABAPENTIN 300 MILLIGRAM(S): 400 CAPSULE ORAL at 13:28

## 2017-06-05 RX ADMIN — GABAPENTIN 300 MILLIGRAM(S): 400 CAPSULE ORAL at 09:23

## 2017-06-05 RX ADMIN — FINASTERIDE 5 MILLIGRAM(S): 5 TABLET, FILM COATED ORAL at 09:23

## 2017-06-05 RX ADMIN — TAMSULOSIN HYDROCHLORIDE 0.4 MILLIGRAM(S): 0.4 CAPSULE ORAL at 21:40

## 2017-06-05 RX ADMIN — BUPROPION HYDROCHLORIDE 300 MILLIGRAM(S): 150 TABLET, EXTENDED RELEASE ORAL at 09:23

## 2017-06-06 PROCEDURE — 99232 SBSQ HOSP IP/OBS MODERATE 35: CPT

## 2017-06-06 RX ADMIN — RISPERIDONE 1 MILLIGRAM(S): 4 TABLET ORAL at 08:33

## 2017-06-06 RX ADMIN — TAMSULOSIN HYDROCHLORIDE 0.4 MILLIGRAM(S): 0.4 CAPSULE ORAL at 20:46

## 2017-06-06 RX ADMIN — GABAPENTIN 300 MILLIGRAM(S): 400 CAPSULE ORAL at 20:46

## 2017-06-06 RX ADMIN — GABAPENTIN 300 MILLIGRAM(S): 400 CAPSULE ORAL at 12:45

## 2017-06-06 RX ADMIN — BUPROPION HYDROCHLORIDE 300 MILLIGRAM(S): 150 TABLET, EXTENDED RELEASE ORAL at 08:33

## 2017-06-06 RX ADMIN — RISPERIDONE 2 MILLIGRAM(S): 4 TABLET ORAL at 20:46

## 2017-06-06 RX ADMIN — GABAPENTIN 300 MILLIGRAM(S): 400 CAPSULE ORAL at 08:33

## 2017-06-06 RX ADMIN — FINASTERIDE 5 MILLIGRAM(S): 5 TABLET, FILM COATED ORAL at 08:33

## 2017-06-07 PROCEDURE — 99232 SBSQ HOSP IP/OBS MODERATE 35: CPT

## 2017-06-07 RX ORDER — RISPERIDONE 4 MG/1
2.5 TABLET ORAL AT BEDTIME
Qty: 0 | Refills: 0 | Status: DISCONTINUED | OUTPATIENT
Start: 2017-06-07 | End: 2017-06-08

## 2017-06-07 RX ADMIN — TAMSULOSIN HYDROCHLORIDE 0.4 MILLIGRAM(S): 0.4 CAPSULE ORAL at 20:42

## 2017-06-07 RX ADMIN — RISPERIDONE 1 MILLIGRAM(S): 4 TABLET ORAL at 09:19

## 2017-06-07 RX ADMIN — GABAPENTIN 300 MILLIGRAM(S): 400 CAPSULE ORAL at 09:19

## 2017-06-07 RX ADMIN — GABAPENTIN 300 MILLIGRAM(S): 400 CAPSULE ORAL at 13:16

## 2017-06-07 RX ADMIN — FINASTERIDE 5 MILLIGRAM(S): 5 TABLET, FILM COATED ORAL at 09:19

## 2017-06-07 RX ADMIN — GABAPENTIN 300 MILLIGRAM(S): 400 CAPSULE ORAL at 20:42

## 2017-06-07 RX ADMIN — RISPERIDONE 2.5 MILLIGRAM(S): 4 TABLET ORAL at 20:42

## 2017-06-07 RX ADMIN — BUPROPION HYDROCHLORIDE 300 MILLIGRAM(S): 150 TABLET, EXTENDED RELEASE ORAL at 09:20

## 2017-06-08 PROCEDURE — 90853 GROUP PSYCHOTHERAPY: CPT

## 2017-06-08 PROCEDURE — 99232 SBSQ HOSP IP/OBS MODERATE 35: CPT | Mod: 25

## 2017-06-08 RX ORDER — RISPERIDONE 4 MG/1
3 TABLET ORAL AT BEDTIME
Qty: 0 | Refills: 0 | Status: DISCONTINUED | OUTPATIENT
Start: 2017-06-08 | End: 2017-07-11

## 2017-06-08 RX ADMIN — GABAPENTIN 300 MILLIGRAM(S): 400 CAPSULE ORAL at 15:01

## 2017-06-08 RX ADMIN — FINASTERIDE 5 MILLIGRAM(S): 5 TABLET, FILM COATED ORAL at 08:18

## 2017-06-08 RX ADMIN — RISPERIDONE 3 MILLIGRAM(S): 4 TABLET ORAL at 20:34

## 2017-06-08 RX ADMIN — GABAPENTIN 300 MILLIGRAM(S): 400 CAPSULE ORAL at 20:34

## 2017-06-08 RX ADMIN — TAMSULOSIN HYDROCHLORIDE 0.4 MILLIGRAM(S): 0.4 CAPSULE ORAL at 20:34

## 2017-06-08 RX ADMIN — GABAPENTIN 300 MILLIGRAM(S): 400 CAPSULE ORAL at 08:18

## 2017-06-08 RX ADMIN — BUPROPION HYDROCHLORIDE 300 MILLIGRAM(S): 150 TABLET, EXTENDED RELEASE ORAL at 08:18

## 2017-06-08 RX ADMIN — RISPERIDONE 1 MILLIGRAM(S): 4 TABLET ORAL at 08:18

## 2017-06-09 PROCEDURE — 99232 SBSQ HOSP IP/OBS MODERATE 35: CPT

## 2017-06-09 RX ADMIN — GABAPENTIN 300 MILLIGRAM(S): 400 CAPSULE ORAL at 13:17

## 2017-06-09 RX ADMIN — TAMSULOSIN HYDROCHLORIDE 0.4 MILLIGRAM(S): 0.4 CAPSULE ORAL at 21:19

## 2017-06-09 RX ADMIN — FINASTERIDE 5 MILLIGRAM(S): 5 TABLET, FILM COATED ORAL at 09:34

## 2017-06-09 RX ADMIN — BUPROPION HYDROCHLORIDE 300 MILLIGRAM(S): 150 TABLET, EXTENDED RELEASE ORAL at 09:34

## 2017-06-09 RX ADMIN — GABAPENTIN 300 MILLIGRAM(S): 400 CAPSULE ORAL at 21:19

## 2017-06-09 RX ADMIN — RISPERIDONE 1 MILLIGRAM(S): 4 TABLET ORAL at 09:34

## 2017-06-09 RX ADMIN — Medication 5 MILLIGRAM(S): at 21:19

## 2017-06-09 RX ADMIN — RISPERIDONE 3 MILLIGRAM(S): 4 TABLET ORAL at 21:19

## 2017-06-09 RX ADMIN — GABAPENTIN 300 MILLIGRAM(S): 400 CAPSULE ORAL at 09:34

## 2017-06-10 RX ORDER — MAGNESIUM HYDROXIDE 400 MG/1
30 TABLET, CHEWABLE ORAL DAILY
Qty: 0 | Refills: 0 | Status: DISCONTINUED | OUTPATIENT
Start: 2017-06-10 | End: 2017-07-11

## 2017-06-10 RX ADMIN — GABAPENTIN 300 MILLIGRAM(S): 400 CAPSULE ORAL at 12:33

## 2017-06-10 RX ADMIN — TAMSULOSIN HYDROCHLORIDE 0.4 MILLIGRAM(S): 0.4 CAPSULE ORAL at 20:26

## 2017-06-10 RX ADMIN — GABAPENTIN 300 MILLIGRAM(S): 400 CAPSULE ORAL at 20:26

## 2017-06-10 RX ADMIN — Medication 5 MILLIGRAM(S): at 20:26

## 2017-06-10 RX ADMIN — RISPERIDONE 3 MILLIGRAM(S): 4 TABLET ORAL at 20:26

## 2017-06-10 RX ADMIN — RISPERIDONE 1 MILLIGRAM(S): 4 TABLET ORAL at 09:32

## 2017-06-10 RX ADMIN — BUPROPION HYDROCHLORIDE 300 MILLIGRAM(S): 150 TABLET, EXTENDED RELEASE ORAL at 09:32

## 2017-06-10 RX ADMIN — GABAPENTIN 300 MILLIGRAM(S): 400 CAPSULE ORAL at 09:32

## 2017-06-10 RX ADMIN — MAGNESIUM HYDROXIDE 30 MILLILITER(S): 400 TABLET, CHEWABLE ORAL at 15:03

## 2017-06-10 RX ADMIN — FINASTERIDE 5 MILLIGRAM(S): 5 TABLET, FILM COATED ORAL at 09:32

## 2017-06-11 RX ADMIN — RISPERIDONE 3 MILLIGRAM(S): 4 TABLET ORAL at 20:47

## 2017-06-11 RX ADMIN — Medication 1 ENEMA: at 10:43

## 2017-06-11 RX ADMIN — GABAPENTIN 300 MILLIGRAM(S): 400 CAPSULE ORAL at 09:23

## 2017-06-11 RX ADMIN — GABAPENTIN 300 MILLIGRAM(S): 400 CAPSULE ORAL at 20:47

## 2017-06-11 RX ADMIN — GABAPENTIN 300 MILLIGRAM(S): 400 CAPSULE ORAL at 12:59

## 2017-06-11 RX ADMIN — RISPERIDONE 1 MILLIGRAM(S): 4 TABLET ORAL at 09:23

## 2017-06-11 RX ADMIN — TAMSULOSIN HYDROCHLORIDE 0.4 MILLIGRAM(S): 0.4 CAPSULE ORAL at 20:47

## 2017-06-11 RX ADMIN — FINASTERIDE 5 MILLIGRAM(S): 5 TABLET, FILM COATED ORAL at 09:23

## 2017-06-11 RX ADMIN — BUPROPION HYDROCHLORIDE 300 MILLIGRAM(S): 150 TABLET, EXTENDED RELEASE ORAL at 09:23

## 2017-06-12 PROCEDURE — 99232 SBSQ HOSP IP/OBS MODERATE 35: CPT | Mod: 25

## 2017-06-12 PROCEDURE — 90853 GROUP PSYCHOTHERAPY: CPT

## 2017-06-12 RX ADMIN — Medication 5 MILLIGRAM(S): at 22:29

## 2017-06-12 RX ADMIN — RISPERIDONE 3 MILLIGRAM(S): 4 TABLET ORAL at 22:30

## 2017-06-12 RX ADMIN — GABAPENTIN 300 MILLIGRAM(S): 400 CAPSULE ORAL at 22:29

## 2017-06-12 RX ADMIN — TAMSULOSIN HYDROCHLORIDE 0.4 MILLIGRAM(S): 0.4 CAPSULE ORAL at 22:30

## 2017-06-12 RX ADMIN — RISPERIDONE 1 MILLIGRAM(S): 4 TABLET ORAL at 08:53

## 2017-06-12 RX ADMIN — GABAPENTIN 300 MILLIGRAM(S): 400 CAPSULE ORAL at 08:53

## 2017-06-12 RX ADMIN — FINASTERIDE 5 MILLIGRAM(S): 5 TABLET, FILM COATED ORAL at 08:53

## 2017-06-12 RX ADMIN — GABAPENTIN 300 MILLIGRAM(S): 400 CAPSULE ORAL at 13:21

## 2017-06-12 RX ADMIN — BUPROPION HYDROCHLORIDE 300 MILLIGRAM(S): 150 TABLET, EXTENDED RELEASE ORAL at 08:53

## 2017-06-13 DIAGNOSIS — C91.10 CHRONIC LYMPHOCYTIC LEUKEMIA OF B-CELL TYPE NOT HAVING ACHIEVED REMISSION: ICD-10-CM

## 2017-06-13 DIAGNOSIS — F33.2 MAJOR DEPRESSIVE DISORDER, RECURRENT SEVERE WITHOUT PSYCHOTIC FEATURES: ICD-10-CM

## 2017-06-13 DIAGNOSIS — C61 MALIGNANT NEOPLASM OF PROSTATE: ICD-10-CM

## 2017-06-13 DIAGNOSIS — L74.0 MILIARIA RUBRA: ICD-10-CM

## 2017-06-13 PROCEDURE — 99232 SBSQ HOSP IP/OBS MODERATE 35: CPT

## 2017-06-13 PROCEDURE — 99223 1ST HOSP IP/OBS HIGH 75: CPT

## 2017-06-13 RX ORDER — HYDROCORTISONE 1 %
1 OINTMENT (GRAM) TOPICAL THREE TIMES A DAY
Qty: 0 | Refills: 0 | Status: DISCONTINUED | OUTPATIENT
Start: 2017-06-13 | End: 2017-06-15

## 2017-06-13 RX ADMIN — RISPERIDONE 3 MILLIGRAM(S): 4 TABLET ORAL at 21:09

## 2017-06-13 RX ADMIN — GABAPENTIN 300 MILLIGRAM(S): 400 CAPSULE ORAL at 09:23

## 2017-06-13 RX ADMIN — RISPERIDONE 1 MILLIGRAM(S): 4 TABLET ORAL at 09:23

## 2017-06-13 RX ADMIN — Medication 1 APPLICATION(S): at 21:09

## 2017-06-13 RX ADMIN — GABAPENTIN 300 MILLIGRAM(S): 400 CAPSULE ORAL at 21:09

## 2017-06-13 RX ADMIN — GABAPENTIN 300 MILLIGRAM(S): 400 CAPSULE ORAL at 12:50

## 2017-06-13 RX ADMIN — FINASTERIDE 5 MILLIGRAM(S): 5 TABLET, FILM COATED ORAL at 09:23

## 2017-06-13 RX ADMIN — TAMSULOSIN HYDROCHLORIDE 0.4 MILLIGRAM(S): 0.4 CAPSULE ORAL at 21:09

## 2017-06-13 RX ADMIN — BUPROPION HYDROCHLORIDE 300 MILLIGRAM(S): 150 TABLET, EXTENDED RELEASE ORAL at 09:23

## 2017-06-13 NOTE — CONSULT NOTE ADULT - ASSESSMENT
75 y/o male with past medical history of major depression disorder, right inguinal hernia, prostate cancer, CLL, anxiety, status post cataract surgery, admitted in [facility] for depression.

## 2017-06-13 NOTE — CONSULT NOTE ADULT - PROBLEM SELECTOR RECOMMENDATION 9
rash developed during hot and humid weather, mainly distributed on upper trunk  neck, and flexural areas, face, palms, and soles are spared. Empirically treated with topical hydrocortisone.   explained with Pt cooler environment, Wear breathable clothing, shower with luke warm water with gentle soap.

## 2017-06-13 NOTE — CONSULT NOTE ADULT - SUBJECTIVE AND OBJECTIVE BOX
HPI:     PAST MEDICAL & SURGICAL HISTORY:  Cataracts, bilateral  Anxiety  Chronic leukemia  Prostate cancer  Hernia, inguinal, right  Depression  Anxiety  Depression  CLL (chronic lymphocytic leukemia)  Hydrocele  Varicocele  Cataract  Umbilical hernia  Prostate cancer  Status post cataract extraction, unspecified laterality  No significant past surgical history  No significant past surgical history      Review of Systems:   CONSTITUTIONAL: No fever, weight loss, or fatigue  EYES: No eye pain, visual disturbances, or discharge  ENMT:  No difficulty hearing, tinnitus, vertigo; No sinus or throat pain  NECK: No pain or stiffness  BREASTS: No pain, masses, or nipple discharge  RESPIRATORY: No cough, wheezing, chills or hemoptysis; No shortness of breath  CARDIOVASCULAR: No chest pain, palpitations, dizziness, or leg swelling  GASTROINTESTINAL: No abdominal or epigastric pain. No nausea, vomiting, or hematemesis; No diarrhea or constipation. No melena or hematochezia.  GENITOURINARY: No dysuria, frequency, hematuria, or incontinence  NEUROLOGICAL: No headaches, memory loss, loss of strength, numbness, or tremors  SKIN: No itching, burning, rashes, or lesions   LYMPH NODES: No enlarged glands  ENDOCRINE: No heat or cold intolerance; No hair loss  MUSCULOSKELETAL: No joint pain or swelling; No muscle, back, or extremity pain  PSYCHIATRIC: No depression, anxiety, mood swings, or difficulty sleeping  HEME/LYMPH: No easy bruising, or bleeding gums  ALLERY AND IMMUNOLOGIC: No hives or eczema    Allergies    latex (Rash)  latex (Unknown)  lidocaine (Hypotension)  lidocaine (Unknown)    Intolerances        Social History:     FAMILY HISTORY:  No pertinent family history in first degree relatives      MEDICATIONS  (STANDING):  finasteride 5milliGRAM(s) Oral daily  tamsulosin 0.4milliGRAM(s) Oral at bedtime  gabapentin 300milliGRAM(s) Oral three times a day  risperiDONE   Tablet 1milliGRAM(s) Oral daily  buPROPion XL 300milliGRAM(s) Oral daily  risperiDONE   Tablet 3milliGRAM(s) Oral at bedtime  bisacodyl 5milliGRAM(s) Oral at bedtime    MEDICATIONS  (PRN):  magnesium hydroxide Suspension 30milliLiter(s) Oral daily PRN Constipation  LORazepam     Tablet 0.5milliGRAM(s) Oral every 6 hours PRN Anxiety  LORazepam   Injectable 1milliGRAM(s) IntraMuscular once PRN Agitation      Vital Signs Last 24 Hrs  T(C): 36.9, Max: 36.9 (06-12 @ 15:20)  HR: --  BP: --  RR: 16 (16 - 16)  SpO2: --  Wt(kg): --  CAPILLARY BLOOD GLUCOSE    I&O's Summary      PHYSICAL EXAM:  GENERAL: NAD, well-developed  HEAD:  Atraumatic, Normocephalic  EYES: EOMI, PERRLA, conjunctiva and sclera clear  NECK: Supple, No JVD  CHEST/LUNG: Clear to auscultation bilaterally; No wheeze  HEART: Regular rate and rhythm; No murmurs, rubs, or gallops  ABDOMEN: Soft, Nontender, Nondistended; Bowel sounds present  EXTREMITIES:  2+ Peripheral Pulses, No clubbing, cyanosis, or edema  PSYCH: AAOx3  NEUROLOGY: non-focal  SKIN:widely spread, non-follicularly-based erythematous papules.      LABS:                    EKG:    RADIOLOGY & ADDITIONAL TESTS:    Imaging Personally Reviewed:    Consultant(s) Notes Reviewed:      Care Discussed with Consultants/Other Providers: HPI: 75 y/o male with past medical history of major depression disorder, right inguinal hernia, prostate cancer, CLL, anxiety, status post cataract surgery, admitted in [facility] for depression. Medical consult was called for pt started itchy diffuse spongiotic vesicles rashes started 1 day ago. Pt reported his room has been very warm in the past 2 days when there is humid and hot weather in [place] 95degrees. The rash is mainly distributed at front chest, back and  Otherwise, he denied new medication, no new food. Confirmed with RN above, no sick contact. He denied oral mucosa and eye involvement of rash.         PAST MEDICAL & SURGICAL HISTORY:  Cataracts, bilateral  Anxiety  Chronic leukemia  Prostate cancer  Hernia, inguinal, right  Depression  Anxiety  Depression  CLL (chronic lymphocytic leukemia)  Hydrocele  Varicocele  Cataract  Umbilical hernia  Prostate cancer  Status post cataract extraction, unspecified laterality  No significant past surgical history  No significant past surgical history      Review of Systems:   CONSTITUTIONAL: No fever, weight loss, or fatigue  EYES: No eye pain, visual disturbances, or discharge  ENMT:  No difficulty hearing, tinnitus, vertigo; No sinus or throat pain  NECK: No pain or stiffness  BREASTS: No pain, masses, or nipple discharge  RESPIRATORY: No cough, wheezing, chills or hemoptysis; No shortness of breath  CARDIOVASCULAR: No chest pain, palpitations, dizziness, or leg swelling  GASTROINTESTINAL: No abdominal or epigastric pain. No nausea, vomiting, or hematemesis; No diarrhea or constipation. No melena or hematochezia.  GENITOURINARY: No dysuria, frequency, hematuria, or incontinence  NEUROLOGICAL: No headaches, memory loss, loss of strength, numbness, or tremors  SKIN: rash  LYMPH NODES: No enlarged glands  ENDOCRINE: No heat or cold intolerance; No hair loss  MUSCULOSKELETAL: No joint pain or swelling; No muscle, back, or extremity pain  PSYCHIATRIC: No depression, anxiety, mood swings, or difficulty sleeping  HEME/LYMPH: No easy bruising, or bleeding gums  ALLERY AND IMMUNOLOGIC: No hives or eczema    Allergies    latex (Rash)  latex (Unknown)  lidocaine (Hypotension)  lidocaine (Unknown)    Intolerances        Social History: denied smoking or drinking    FAMILY HISTORY:  No pertinent family history in first degree relatives      MEDICATIONS  (STANDING):  finasteride 5milliGRAM(s) Oral daily  tamsulosin 0.4milliGRAM(s) Oral at bedtime  gabapentin 300milliGRAM(s) Oral three times a day  risperiDONE   Tablet 1milliGRAM(s) Oral daily  buPROPion XL 300milliGRAM(s) Oral daily  risperiDONE   Tablet 3milliGRAM(s) Oral at bedtime  bisacodyl 5milliGRAM(s) Oral at bedtime    MEDICATIONS  (PRN):  magnesium hydroxide Suspension 30milliLiter(s) Oral daily PRN Constipation  LORazepam     Tablet 0.5milliGRAM(s) Oral every 6 hours PRN Anxiety  LORazepam   Injectable 1milliGRAM(s) IntraMuscular once PRN Agitation      Vital Signs Last 24 Hrs  T(C): 36.9, Max: 36.9 (06-12 @ 15:20)  HR: --  BP: --  RR: 16 (16 - 16)  SpO2: --  Wt(kg): --  CAPILLARY BLOOD GLUCOSE    I&O's Summary  Comprehensive Metabolic Panel (05.27.17 @ 08:00)    Sodium, Serum: 140 mmol/L    Potassium, Serum: 4.3 mmol/L    Chloride, Serum: 101 mmol/L    Carbon Dioxide, Serum: 28 mmol/L    Blood Urea Nitrogen, Serum: 11 mg/dL    Creatinine, Serum: 0.69 mg/dL    Glucose, Serum: 81 mg/dL    Calcium, Total Serum: 9.5 mg/dL    Protein Total, Serum: 6.3 g/dL    Albumin, Serum: 4.0 g/dL    Bilirubin Total, Serum: 0.5 mg/dL    Alkaline Phosphatase, Serum: 55: Please note new reference ranges are adjusted for age and  gender. u/L    Aspartate Aminotransferase (AST/SGOT): 19 u/L    Alanine Aminotransferase (ALT/SGPT): 15 u/L    eGFR if Non : 92: The units for eGFR are ml/min/1.73m2 (normalized body  surface area). The eGFR is calculated from a serum  creatinine using the CKD-EPI equation. Other variables  required for calculation are race, age and sex. Among  patients with chronic kidney dise92: ase (CKD), the eGFR is  useful in determining the stage of disease according to  KDOQI CKD classification. All eGFR results are reported  numerically with the following interpretation.    GFR  (ml/min/1.73 m2)          W/KIDNEY DAMAGE    W/O KIDNEY DM92: G  ==========================================================  >= 90.......................Stage 1..............Normal  60-89.......................Stage 2...........Decreased GFR  30-59.......................Stage 3..............Stage 3  15-29.......92: ................Stage 4..............Stage 4  < 15........................Stage 5..............Stage 5    Each stage of CKD assumes that the associated GFR level  has been in effect for at least 3 months. Determination of  stages one and two (with eGF92: R > 59ml/min/m2) requires  estimation of kidney damage for at least 3 months as  defined by structural or functional abnormalities.    Limitations: All estimates of GFR will be less accurate  for patients at extremes of muscle mass (including but  not92:  limited to frail elderly, critically ill, or cancer  patients), those with unusual diets, and those with  conditions associated with reduced secretion or  extrarenal elimination of creatinine. The eGFR equation  is not recommended for use in [id number]:  with unstable  creatinine levels. mL/min    eGFR if : 107 mL/min        PHYSICAL EXAM:  GENERAL: NAD, well-developed  HEAD:  Atraumatic, Normocephalic  EYES: EOMI, PERRLA, conjunctiva and sclera clear  NECK: Supple, No JVD  CHEST/LUNG: Clear to auscultation bilaterally; No wheeze  HEART: Regular rate and rhythm; No murmurs, rubs, or gallops  ABDOMEN: Soft, Nontender, Nondistended; Bowel sounds present  EXTREMITIES:  2+ Peripheral Pulses, No clubbing, cyanosis, or edema  PSYCH: AAOx3, calm  NEUROLOGY: non-focal  SKIN: widely spread, non-follicularly-based erythematous papules, mainly on front chest, back, and upper arms (inner side), b/l thighs.      LABS:    Complete Blood Count (05.27.17 @ 08:00)    WBC Count: 10.62 K/uL    RBC Count: 3.79 M/uL    Hemoglobin: 12.3 g/dL    Hematocrit: 38.4 %    Mean Cell Volume: 101.3 fL    Mean Cell Hemoglobin: 32.5 pg    Mean Cell Hemoglobin Conc: 32.0 %    Red Cell Distrib Width: 12.7 %    Platelet Count - Automated: 212 K/uL    MPV: 10.3 fl                    EKG:Diagnosis Line Normal sinus rhythm  Normal ECG  No previous ECGs available    RADIOLOGY & ADDITIONAL TESTS:    Imaging Personally Reviewed:    Consultant(s) Notes Reviewed:      Care Discussed with Consultants/Other Providers:

## 2017-06-14 PROCEDURE — 99232 SBSQ HOSP IP/OBS MODERATE 35: CPT

## 2017-06-14 RX ORDER — RISPERIDONE 4 MG/1
2 TABLET ORAL DAILY
Qty: 0 | Refills: 0 | Status: DISCONTINUED | OUTPATIENT
Start: 2017-06-14 | End: 2017-07-14

## 2017-06-14 RX ADMIN — TAMSULOSIN HYDROCHLORIDE 0.4 MILLIGRAM(S): 0.4 CAPSULE ORAL at 20:52

## 2017-06-14 RX ADMIN — BUPROPION HYDROCHLORIDE 300 MILLIGRAM(S): 150 TABLET, EXTENDED RELEASE ORAL at 08:25

## 2017-06-14 RX ADMIN — FINASTERIDE 5 MILLIGRAM(S): 5 TABLET, FILM COATED ORAL at 08:25

## 2017-06-14 RX ADMIN — GABAPENTIN 300 MILLIGRAM(S): 400 CAPSULE ORAL at 14:27

## 2017-06-14 RX ADMIN — Medication 1 APPLICATION(S): at 20:52

## 2017-06-14 RX ADMIN — Medication 1 APPLICATION(S): at 09:25

## 2017-06-14 RX ADMIN — GABAPENTIN 300 MILLIGRAM(S): 400 CAPSULE ORAL at 20:52

## 2017-06-14 RX ADMIN — RISPERIDONE 3 MILLIGRAM(S): 4 TABLET ORAL at 20:52

## 2017-06-14 RX ADMIN — Medication 1 APPLICATION(S): at 14:27

## 2017-06-14 RX ADMIN — GABAPENTIN 300 MILLIGRAM(S): 400 CAPSULE ORAL at 08:25

## 2017-06-14 NOTE — PROGRESS NOTE ADULT - SUBJECTIVE AND OBJECTIVE BOX
CC/Reason for Consult: f/u heat rash    SUBJECTIVE / OVERNIGHT EVENTS: afebrile, improved rash on front chest, more rash came out f/l inner side of fore arms, no rash on head/palms or feet.    MEDICATIONS  (STANDING):  finasteride 5milliGRAM(s) Oral daily  tamsulosin 0.4milliGRAM(s) Oral at bedtime  gabapentin 300milliGRAM(s) Oral three times a day  buPROPion XL 300milliGRAM(s) Oral daily  risperiDONE   Tablet 3milliGRAM(s) Oral at bedtime  bisacodyl 5milliGRAM(s) Oral at bedtime  hydrocortisone 1% Cream 1Application(s) Topical three times a day  risperiDONE   Tablet 2milliGRAM(s) Oral daily    MEDICATIONS  (PRN):  magnesium hydroxide Suspension 30milliLiter(s) Oral daily PRN Constipation  LORazepam     Tablet 0.5milliGRAM(s) Oral every 6 hours PRN Anxiety  LORazepam   Injectable 1milliGRAM(s) IntraMuscular once PRN Agitation      Vital Signs Last 24 Hrs  T(C): 37.2, Max: 37.3 (06-13 @ 15:33)  HR: --89  BP: --103/64  RR: --  SpO2: --  Wt(kg): --  CAPILLARY BLOOD GLUCOSE    I&O's Summary      PHYSICAL EXAM:  GENERAL: NAD, well-developed  HEAD:  Atraumatic, Normocephalic  EYES: EOMI, PERRLA, conjunctiva and sclera clear  NECK: Supple, No JVD  CHEST/LUNG: Clear to auscultation bilaterally; No wheeze  HEART: Regular rate and rhythm; No murmurs, rubs, or gallops  ABDOMEN: Soft, Nontender, Nondistended; Bowel sounds present  EXTREMITIES:  2+ Peripheral Pulses, No clubbing, cyanosis, or edema  PSYCH: AAOx3  NEUROLOGY: non-focal  SKIN: wide spread erythematous papules, improved on front chest, remained on back, b/l arms, worse inner side, and b/l thighs.     LABS:                    RADIOLOGY & ADDITIONAL TESTS:    Imaging Personally Reviewed:    Consultant(s) Notes Reviewed:      Care Discussed with Consultants/Other Providers:

## 2017-06-14 NOTE — PROGRESS NOTE ADULT - ASSESSMENT
75 y/o male with past medical history of major depression disorder, right inguinal hernia, prostate cancer, CLL, anxiety, status post cataract surgery, admitted in [facility] for depression.

## 2017-06-15 LAB
ALBUMIN SERPL ELPH-MCNC: 3.8 G/DL — SIGNIFICANT CHANGE UP (ref 3.3–5)
ALP SERPL-CCNC: 54 U/L — SIGNIFICANT CHANGE UP (ref 40–120)
ALT FLD-CCNC: 21 U/L — SIGNIFICANT CHANGE UP (ref 4–41)
AST SERPL-CCNC: 19 U/L — SIGNIFICANT CHANGE UP (ref 4–40)
BASOPHILS # BLD AUTO: 0.02 K/UL — SIGNIFICANT CHANGE UP (ref 0–0.2)
BASOPHILS NFR BLD AUTO: 0.2 % — SIGNIFICANT CHANGE UP (ref 0–2)
BASOPHILS NFR SPEC: 0 % — SIGNIFICANT CHANGE UP (ref 0–2)
BILIRUB SERPL-MCNC: 0.3 MG/DL — SIGNIFICANT CHANGE UP (ref 0.2–1.2)
BUN SERPL-MCNC: 16 MG/DL — SIGNIFICANT CHANGE UP (ref 7–23)
CALCIUM SERPL-MCNC: 9.2 MG/DL — SIGNIFICANT CHANGE UP (ref 8.4–10.5)
CHLORIDE SERPL-SCNC: 96 MMOL/L — LOW (ref 98–107)
CO2 SERPL-SCNC: 29 MMOL/L — SIGNIFICANT CHANGE UP (ref 22–31)
CREAT SERPL-MCNC: 0.76 MG/DL — SIGNIFICANT CHANGE UP (ref 0.5–1.3)
CRP SERPL-MCNC: 11.3 MG/L — HIGH (ref 0.3–5)
EOSINOPHIL # BLD AUTO: 0.44 K/UL — SIGNIFICANT CHANGE UP (ref 0–0.5)
EOSINOPHIL NFR BLD AUTO: 4.3 % — SIGNIFICANT CHANGE UP (ref 0–6)
EOSINOPHIL NFR FLD: 7 % — HIGH (ref 0–6)
ERYTHROCYTE [SEDIMENTATION RATE] IN BLOOD: 27 MM/HR — HIGH (ref 1–15)
GLUCOSE SERPL-MCNC: 95 MG/DL — SIGNIFICANT CHANGE UP (ref 70–99)
HCT VFR BLD CALC: 38.4 % — LOW (ref 39–50)
HGB BLD-MCNC: 12.3 G/DL — LOW (ref 13–17)
IMM GRANULOCYTES NFR BLD AUTO: 0.3 % — SIGNIFICANT CHANGE UP (ref 0–1.5)
LYMPHOCYTES # BLD AUTO: 5.89 K/UL — HIGH (ref 1–3.3)
LYMPHOCYTES # BLD AUTO: 58 % — HIGH (ref 13–44)
LYMPHOCYTES NFR SPEC AUTO: 54 % — HIGH (ref 13–44)
MANUAL SMEAR VERIFICATION: YES — SIGNIFICANT CHANGE UP
MCHC RBC-ENTMCNC: 32 % — SIGNIFICANT CHANGE UP (ref 32–36)
MCHC RBC-ENTMCNC: 32.8 PG — SIGNIFICANT CHANGE UP (ref 27–34)
MCV RBC AUTO: 102.4 FL — HIGH (ref 80–100)
MONOCYTES # BLD AUTO: 0.26 K/UL — SIGNIFICANT CHANGE UP (ref 0–0.9)
MONOCYTES NFR BLD AUTO: 2.6 % — SIGNIFICANT CHANGE UP (ref 2–14)
MONOCYTES NFR BLD: 4 % — SIGNIFICANT CHANGE UP (ref 2–9)
NEUTROPHIL AB SER-ACNC: 34 % — LOW (ref 43–77)
NEUTROPHILS # BLD AUTO: 3.52 K/UL — SIGNIFICANT CHANGE UP (ref 1.8–7.4)
NEUTROPHILS NFR BLD AUTO: 34.6 % — LOW (ref 43–77)
PLATELET # BLD AUTO: 256 K/UL — SIGNIFICANT CHANGE UP (ref 150–400)
PLATELET COUNT - ESTIMATE: NORMAL — SIGNIFICANT CHANGE UP
PMV BLD: 10.5 FL — SIGNIFICANT CHANGE UP (ref 7–13)
POTASSIUM SERPL-MCNC: 4.8 MMOL/L — SIGNIFICANT CHANGE UP (ref 3.5–5.3)
POTASSIUM SERPL-SCNC: 4.8 MMOL/L — SIGNIFICANT CHANGE UP (ref 3.5–5.3)
PROT SERPL-MCNC: 6.3 G/DL — SIGNIFICANT CHANGE UP (ref 6–8.3)
RBC # BLD: 3.75 M/UL — LOW (ref 4.2–5.8)
RBC # FLD: 13.2 % — SIGNIFICANT CHANGE UP (ref 10.3–14.5)
SODIUM SERPL-SCNC: 137 MMOL/L — SIGNIFICANT CHANGE UP (ref 135–145)
VARIANT LYMPHS # BLD: 1 % — SIGNIFICANT CHANGE UP
WBC # BLD: 10.16 K/UL — SIGNIFICANT CHANGE UP (ref 3.8–10.5)
WBC # FLD AUTO: 10.16 K/UL — SIGNIFICANT CHANGE UP (ref 3.8–10.5)

## 2017-06-15 PROCEDURE — 99232 SBSQ HOSP IP/OBS MODERATE 35: CPT

## 2017-06-15 PROCEDURE — 99233 SBSQ HOSP IP/OBS HIGH 50: CPT

## 2017-06-15 RX ORDER — DIPHENHYDRAMINE HCL 50 MG
25 CAPSULE ORAL EVERY 6 HOURS
Qty: 0 | Refills: 0 | Status: DISCONTINUED | OUTPATIENT
Start: 2017-06-15 | End: 2017-07-14

## 2017-06-15 RX ADMIN — GABAPENTIN 300 MILLIGRAM(S): 400 CAPSULE ORAL at 15:04

## 2017-06-15 RX ADMIN — TAMSULOSIN HYDROCHLORIDE 0.4 MILLIGRAM(S): 0.4 CAPSULE ORAL at 21:11

## 2017-06-15 RX ADMIN — GABAPENTIN 300 MILLIGRAM(S): 400 CAPSULE ORAL at 11:46

## 2017-06-15 RX ADMIN — Medication 25 MILLIGRAM(S): at 21:13

## 2017-06-15 RX ADMIN — Medication 25 MILLIGRAM(S): at 15:04

## 2017-06-15 RX ADMIN — FINASTERIDE 5 MILLIGRAM(S): 5 TABLET, FILM COATED ORAL at 11:46

## 2017-06-15 RX ADMIN — GABAPENTIN 300 MILLIGRAM(S): 400 CAPSULE ORAL at 21:11

## 2017-06-15 RX ADMIN — Medication 1 APPLICATION(S): at 21:13

## 2017-06-15 NOTE — PROGRESS NOTE ADULT - SUBJECTIVE AND OBJECTIVE BOX
CC/Reason for Consult: follow up rash    SUBJECTIVE / OVERNIGHT EVENTS: rash more spread out, in involving front of knee, and b/l fore arms, no oral mucosa involvement, no palm/foot involvement. afebrile    MEDICATIONS  (STANDING):  finasteride 5milliGRAM(s) Oral daily  tamsulosin 0.4milliGRAM(s) Oral at bedtime  gabapentin 300milliGRAM(s) Oral three times a day  buPROPion XL 300milliGRAM(s) Oral daily  risperiDONE   Tablet 3milliGRAM(s) Oral at bedtime  bisacodyl 5milliGRAM(s) Oral at bedtime  risperiDONE   Tablet 2milliGRAM(s) Oral daily  triamcinolone 0.1% Cream 1Application(s) Topical two times a day    MEDICATIONS  (PRN):  magnesium hydroxide Suspension 30milliLiter(s) Oral daily PRN Constipation  LORazepam     Tablet 0.5milliGRAM(s) Oral every 6 hours PRN Anxiety  LORazepam   Injectable 1milliGRAM(s) IntraMuscular once PRN Agitation  diphenhydrAMINE   Capsule 25milliGRAM(s) Oral every 6 hours PRN Rash and/or Itching      Vital Signs Last 24 Hrs  T(C): 36.8, Max: 37.1 (06-14 @ 15:38)  HR: --84  BP: --108/58  RR: --  SpO2: --  Wt(kg): --  CAPILLARY BLOOD GLUCOSE    I&O's Summary      PHYSICAL EXAM:  GENERAL: NAD, well-developed  HEAD:  Atraumatic, Normocephalic  EYES: EOMI, PERRLA, conjunctiva and sclera clear  NECK: Supple, No JVD  CHEST/LUNG: Clear to auscultation bilaterally; No wheeze  HEART: Regular rate and rhythm; No murmurs, rubs, or gallops  ABDOMEN: Soft, Nontender, Nondistended; Bowel sounds present  EXTREMITIES:  2+ Peripheral Pulses, No clubbing, cyanosis, or edema  PSYCH: AAOx3  NEUROLOGY: non-focal  SKIN: diffuse macular-papular rash, no discharge    LABS:                        12.3   10.16 )-----------( 256      ( 15 Arnold 2017 13:26 )             38.4                     RADIOLOGY & ADDITIONAL TESTS:    Imaging Personally Reviewed:    Consultant(s) Notes Reviewed:      Care Discussed with Consultants/Other Providers:

## 2017-06-15 NOTE — PROGRESS NOTE ADULT - ASSESSMENT
75 y/o male with past medical history of major depression disorder, right inguinal hernia, prostate cancer, CLL, anxiety, status post cataract surgery, admitted in [facility] for depression.

## 2017-06-15 NOTE — CONSULT NOTE ADULT - SUBJECTIVE AND OBJECTIVE BOX
77 yo man admitted for major depression presents with 3 day history of rash that has enlarged. Pt denies pruritus. He denies oral or genital lesions. Denies skin pain. He started wellbutrin 10 days ago.     PMH: MDD, CLL, BPH    MEDICATIONS  (STANDING):  finasteride 5milliGRAM(s) Oral daily  tamsulosin 0.4milliGRAM(s) Oral at bedtime  gabapentin 300milliGRAM(s) Oral three times a day  buPROPion XL 300milliGRAM(s) Oral daily  risperiDONE   Tablet 3milliGRAM(s) Oral at bedtime  bisacodyl 5milliGRAM(s) Oral at bedtime  risperiDONE   Tablet 2milliGRAM(s) Oral daily  triamcinolone 0.1% Cream 1Application(s) Topical two times a day    MEDICATIONS  (PRN):  magnesium hydroxide Suspension 30milliLiter(s) Oral daily PRN Constipation  LORazepam     Tablet 0.5milliGRAM(s) Oral every 6 hours PRN Anxiety  LORazepam   Injectable 1milliGRAM(s) IntraMuscular once PRN Agitation  diphenhydrAMINE   Capsule 25milliGRAM(s) Oral every 6 hours PRN Rash and/or Itching      Allergies    latex (Rash)  latex (Unknown)  lidocaine (Hypotension)  lidocaine (Unknown)    REVIEW OF SYSTEMS  General: no fevers/chills, no NS	    Skin: see HPI  	  Ophthalmologic: no eye pain or change in vision    Genitourinary: no dysuria or hematuria    Musculoskeletal: no joint pains or weakness	    Neurological:no weakness or tingling      Vital Signs Last 24 Hrs  T(C): 36.8, Max: 37.1 (06-14 @ 15:38)  T(F): 98.3, Max: 98.8 (06-14 @ 15:38)  HR: --  BP: --  BP(mean): --  RR: --  SpO2: --    PHYSICAL EXAM:   The patient was alert and oriented X 3, well nourished, and in no  apparent distress.  There was no visible lymphadenopathy.  Conjunctiva were non injected    Of note on skin exam:   chest, back, abdomen, b/l arms, palms, buttock, legs with erythematous papules coalescing into plaques  oral and genital mucosa clear      LABS:                        12.3   10.16 )-----------( 256      ( 15 Arnold 2017 13:26 )             38.4     06-15    137  |  96<L>  |  16  ----------------------------<  95  4.8   |  29  |  0.76    Ca    9.2      15 Arnold 2017 13:26    TPro  6.3  /  Alb  3.8  /  TBili  0.3  /  DBili  x   /  AST  19  /  ALT  21  /  AlkPhos  54  06-15

## 2017-06-15 NOTE — PROGRESS NOTE ADULT - ATTENDING COMMENTS
discussed with Pt. discussed with Psych attending Dr. Krishna. discussed with Pt. discussed with Psych attending Dr. Krishna.    3/10 addendum: discussed with derm, rash possible morbilliform drug eruption, suspect Wellbutrin, Discussed with Dr. Krishna, OK to d/c Wellbutrin today. Order d/anali in [place].

## 2017-06-15 NOTE — CONSULT NOTE ADULT - ASSESSMENT
75 yo man admitted with depression and started on wellbutrin 7 days before eruption developed    likely morbilliform drug eruption 2/2 buproprion  - if pt is itchy, can continue TAC 0.1% cream to affected areas and benadryl PO  - there is no intervention to change the progression of this eruption, it will likely worsen before improving, darken and desquamate over 4-6 wks  - please page derm if pt develops blisters, skin pain or involvement of the oral or genital mucosa    Case discussed with Dr. Fields via teledermatology

## 2017-06-16 DIAGNOSIS — L27.0 GENERALIZED SKIN ERUPTION DUE TO DRUGS AND MEDICAMENTS TAKEN INTERNALLY: ICD-10-CM

## 2017-06-16 PROCEDURE — 99232 SBSQ HOSP IP/OBS MODERATE 35: CPT

## 2017-06-16 RX ORDER — SERTRALINE 25 MG/1
50 TABLET, FILM COATED ORAL DAILY
Qty: 0 | Refills: 0 | Status: DISCONTINUED | OUTPATIENT
Start: 2017-06-16 | End: 2017-06-19

## 2017-06-16 RX ADMIN — GABAPENTIN 300 MILLIGRAM(S): 400 CAPSULE ORAL at 13:51

## 2017-06-16 RX ADMIN — GABAPENTIN 300 MILLIGRAM(S): 400 CAPSULE ORAL at 20:26

## 2017-06-16 RX ADMIN — Medication 5 MILLIGRAM(S): at 20:26

## 2017-06-16 RX ADMIN — RISPERIDONE 3 MILLIGRAM(S): 4 TABLET ORAL at 20:26

## 2017-06-16 RX ADMIN — GABAPENTIN 300 MILLIGRAM(S): 400 CAPSULE ORAL at 08:27

## 2017-06-16 RX ADMIN — FINASTERIDE 5 MILLIGRAM(S): 5 TABLET, FILM COATED ORAL at 08:27

## 2017-06-16 RX ADMIN — RISPERIDONE 2 MILLIGRAM(S): 4 TABLET ORAL at 08:27

## 2017-06-16 RX ADMIN — Medication 1 APPLICATION(S): at 21:46

## 2017-06-16 RX ADMIN — Medication 1 APPLICATION(S): at 08:27

## 2017-06-16 RX ADMIN — SERTRALINE 50 MILLIGRAM(S): 25 TABLET, FILM COATED ORAL at 08:27

## 2017-06-16 RX ADMIN — TAMSULOSIN HYDROCHLORIDE 0.4 MILLIGRAM(S): 0.4 CAPSULE ORAL at 20:26

## 2017-06-16 NOTE — PROGRESS NOTE ADULT - PROBLEM SELECTOR PROBLEM 3
CLL (chronic lymphocytic leukemia)

## 2017-06-16 NOTE — PROGRESS NOTE ADULT - SUBJECTIVE AND OBJECTIVE BOX
CC/Reason for Consult: f/u rash,     SUBJECTIVE / OVERNIGHT EVENTS: no blisters, no oral lesion, afebrile, denied eye pain, no N/V/D, no abd pain. reported urinating well.     MEDICATIONS  (STANDING):  finasteride 5milliGRAM(s) Oral daily  tamsulosin 0.4milliGRAM(s) Oral at bedtime  gabapentin 300milliGRAM(s) Oral three times a day  risperiDONE   Tablet 3milliGRAM(s) Oral at bedtime  bisacodyl 5milliGRAM(s) Oral at bedtime  risperiDONE   Tablet 2milliGRAM(s) Oral daily  triamcinolone 0.1% Cream 1Application(s) Topical two times a day  sertraline 50milliGRAM(s) Oral daily    MEDICATIONS  (PRN):  magnesium hydroxide Suspension 30milliLiter(s) Oral daily PRN Constipation  LORazepam     Tablet 0.5milliGRAM(s) Oral every 6 hours PRN Anxiety  LORazepam   Injectable 1milliGRAM(s) IntraMuscular once PRN Agitation  diphenhydrAMINE   Capsule 25milliGRAM(s) Oral every 6 hours PRN Rash and/or Itching      Vital Signs Last 24 Hrs  T(C): 37, Max: 37 (06-16 @ 05:16)  HR: --71  BP: --109/59  RR: 18 (18 - 18)  SpO2: --  Wt(kg): --  CAPILLARY BLOOD GLUCOSE    I&O's Summary      PHYSICAL EXAM:  GENERAL: NAD, well-developed  HEAD:  Atraumatic, Normocephalic  EYES: EOMI, PERRLA, conjunctiva and sclera clear  NECK: Supple, No JVD  CHEST/LUNG: Clear to auscultation bilaterally; No wheeze  HEART: Regular rate and rhythm; No murmurs, rubs, or gallops  ABDOMEN: Soft, Nontender, Nondistended; Bowel sounds present  EXTREMITIES:  2+ Peripheral Pulses, No clubbing, cyanosis, or edema  PSYCH: AAOx3  NEUROLOGY: non-focal  SKIN: diffuse erythematous macular-papular rash no chest/back, buttock, thighs, b/l UE , no blisters, no oral lesion    LABS:                        12.3   10.16 )-----------( 256      ( 15 Arnold 2017 13:26 )             38.4     06-15    137  |  96<L>  |  16  ----------------------------<  95  4.8   |  29  |  0.76    Ca    9.2      15 Arnold 2017 13:26    TPro  6.3  /  Alb  3.8  /  TBili  0.3  /  DBili  x   /  AST  19  /  ALT  21  /  AlkPhos  54  06-15              RADIOLOGY & ADDITIONAL TESTS:    Imaging Personally Reviewed:    Consultant(s) Notes Reviewed:  Dermatology    Care Discussed with Consultants/Other Providers:

## 2017-06-16 NOTE — PROGRESS NOTE ADULT - PROBLEM SELECTOR PROBLEM 2
Severe episode of recurrent major depressive disorder, without psychotic features

## 2017-06-16 NOTE — PROGRESS NOTE ADULT - PROBLEM SELECTOR PLAN 2
management as per psych team  on Risperdal (was on risperdal for long time) and Ativan,  off wellbutrin
management as per psych team  on Risperdal (was on risperdal for long time, Ativan,  wellbutrin was hold today, Called Dr. Krishna, if rash persistent, may consider hold off wellbutrin for possible other etiology of rash. will also f/u with Derm
management as per psych team  on Risperdal, Ativan, wellbutrin

## 2017-06-16 NOTE — PROGRESS NOTE ADULT - PROBLEM SELECTOR PLAN 1
As rash is not improving with topical hydrocortisone, changed to Triamcinolone 0.1%, added benadryl PRN.   oral hydration, cooler environment, gentle soap and lukewarm water  also ordered stat lab to rull out other possible cause of rash such as drug reaction (started Wellbutrin Jun 5, 2017), CBC reviewed, eosinophil WNL, f/u LFT/renal function, ESR/CRP.   Derm consulted
Derm consult reviewed, rash like due to drug reaction (Wellbutrin),  no new rash appeared since yesterday, no blister or oral lesion.  Pt afebrile, lab reviewed, no eosinophil, WBC WNL, normal LFT and renal function, DRESS less likely.   continue topical steroid and benadryl prn for symptom support.   Explained at bed side with Pt, rash appeared after 7 days starting Wellburin, usually rash will resolve within 4-6 weeks, although it may appears worse before improving. Offending medication is stopped, no oral lesion, BW WNL. He verbally expressed understanding.
continue topical hydrocortisone, reassured pt   oral hydration, cooler environment, gentle soap and lukewarm water  if not resolved in 1 week, may consider high potency topical Triamcinolone 0.1%

## 2017-06-16 NOTE — PROGRESS NOTE ADULT - ASSESSMENT
75 y/o male with past medical history of major depression disorder, right inguinal hernia, prostate cancer, CLL, anxiety, status post cataract surgery, admitted in [facility] for depression.

## 2017-06-17 RX ADMIN — RISPERIDONE 3 MILLIGRAM(S): 4 TABLET ORAL at 21:08

## 2017-06-17 RX ADMIN — Medication 5 MILLIGRAM(S): at 21:08

## 2017-06-17 RX ADMIN — GABAPENTIN 300 MILLIGRAM(S): 400 CAPSULE ORAL at 13:57

## 2017-06-17 RX ADMIN — GABAPENTIN 300 MILLIGRAM(S): 400 CAPSULE ORAL at 09:31

## 2017-06-17 RX ADMIN — SERTRALINE 50 MILLIGRAM(S): 25 TABLET, FILM COATED ORAL at 09:31

## 2017-06-17 RX ADMIN — GABAPENTIN 300 MILLIGRAM(S): 400 CAPSULE ORAL at 21:08

## 2017-06-17 RX ADMIN — TAMSULOSIN HYDROCHLORIDE 0.4 MILLIGRAM(S): 0.4 CAPSULE ORAL at 21:08

## 2017-06-17 RX ADMIN — Medication 1 APPLICATION(S): at 10:57

## 2017-06-17 RX ADMIN — FINASTERIDE 5 MILLIGRAM(S): 5 TABLET, FILM COATED ORAL at 09:31

## 2017-06-17 RX ADMIN — Medication 1 APPLICATION(S): at 21:08

## 2017-06-17 RX ADMIN — RISPERIDONE 2 MILLIGRAM(S): 4 TABLET ORAL at 09:31

## 2017-06-18 RX ADMIN — RISPERIDONE 2 MILLIGRAM(S): 4 TABLET ORAL at 08:29

## 2017-06-18 RX ADMIN — FINASTERIDE 5 MILLIGRAM(S): 5 TABLET, FILM COATED ORAL at 08:29

## 2017-06-18 RX ADMIN — GABAPENTIN 300 MILLIGRAM(S): 400 CAPSULE ORAL at 20:27

## 2017-06-18 RX ADMIN — Medication 5 MILLIGRAM(S): at 20:27

## 2017-06-18 RX ADMIN — TAMSULOSIN HYDROCHLORIDE 0.4 MILLIGRAM(S): 0.4 CAPSULE ORAL at 20:27

## 2017-06-18 RX ADMIN — SERTRALINE 50 MILLIGRAM(S): 25 TABLET, FILM COATED ORAL at 08:29

## 2017-06-18 RX ADMIN — GABAPENTIN 300 MILLIGRAM(S): 400 CAPSULE ORAL at 08:29

## 2017-06-18 RX ADMIN — Medication 1 APPLICATION(S): at 08:29

## 2017-06-18 RX ADMIN — RISPERIDONE 3 MILLIGRAM(S): 4 TABLET ORAL at 20:27

## 2017-06-18 RX ADMIN — GABAPENTIN 300 MILLIGRAM(S): 400 CAPSULE ORAL at 13:47

## 2017-06-18 RX ADMIN — Medication 1 APPLICATION(S): at 21:26

## 2017-06-19 PROCEDURE — 99232 SBSQ HOSP IP/OBS MODERATE 35: CPT | Mod: 25

## 2017-06-19 PROCEDURE — 90853 GROUP PSYCHOTHERAPY: CPT

## 2017-06-19 RX ORDER — SERTRALINE 25 MG/1
100 TABLET, FILM COATED ORAL DAILY
Qty: 0 | Refills: 0 | Status: DISCONTINUED | OUTPATIENT
Start: 2017-06-19 | End: 2017-06-21

## 2017-06-19 RX ADMIN — TAMSULOSIN HYDROCHLORIDE 0.4 MILLIGRAM(S): 0.4 CAPSULE ORAL at 23:11

## 2017-06-19 RX ADMIN — GABAPENTIN 300 MILLIGRAM(S): 400 CAPSULE ORAL at 23:10

## 2017-06-19 RX ADMIN — GABAPENTIN 300 MILLIGRAM(S): 400 CAPSULE ORAL at 08:20

## 2017-06-19 RX ADMIN — RISPERIDONE 2 MILLIGRAM(S): 4 TABLET ORAL at 08:20

## 2017-06-19 RX ADMIN — Medication 1 APPLICATION(S): at 08:20

## 2017-06-19 RX ADMIN — RISPERIDONE 3 MILLIGRAM(S): 4 TABLET ORAL at 23:11

## 2017-06-19 RX ADMIN — FINASTERIDE 5 MILLIGRAM(S): 5 TABLET, FILM COATED ORAL at 08:20

## 2017-06-19 RX ADMIN — Medication 1 APPLICATION(S): at 21:32

## 2017-06-19 RX ADMIN — GABAPENTIN 300 MILLIGRAM(S): 400 CAPSULE ORAL at 12:59

## 2017-06-19 RX ADMIN — SERTRALINE 100 MILLIGRAM(S): 25 TABLET, FILM COATED ORAL at 08:20

## 2017-06-20 PROCEDURE — 99232 SBSQ HOSP IP/OBS MODERATE 35: CPT

## 2017-06-20 RX ADMIN — Medication 1 APPLICATION(S): at 21:22

## 2017-06-20 RX ADMIN — SERTRALINE 100 MILLIGRAM(S): 25 TABLET, FILM COATED ORAL at 08:54

## 2017-06-20 RX ADMIN — Medication 1 APPLICATION(S): at 08:54

## 2017-06-20 RX ADMIN — GABAPENTIN 300 MILLIGRAM(S): 400 CAPSULE ORAL at 21:22

## 2017-06-20 RX ADMIN — RISPERIDONE 2 MILLIGRAM(S): 4 TABLET ORAL at 08:54

## 2017-06-20 RX ADMIN — GABAPENTIN 300 MILLIGRAM(S): 400 CAPSULE ORAL at 12:36

## 2017-06-20 RX ADMIN — Medication 5 MILLIGRAM(S): at 21:22

## 2017-06-20 RX ADMIN — GABAPENTIN 300 MILLIGRAM(S): 400 CAPSULE ORAL at 08:54

## 2017-06-20 RX ADMIN — RISPERIDONE 3 MILLIGRAM(S): 4 TABLET ORAL at 21:22

## 2017-06-20 RX ADMIN — TAMSULOSIN HYDROCHLORIDE 0.4 MILLIGRAM(S): 0.4 CAPSULE ORAL at 21:22

## 2017-06-20 RX ADMIN — FINASTERIDE 5 MILLIGRAM(S): 5 TABLET, FILM COATED ORAL at 08:55

## 2017-06-21 ENCOUNTER — EMERGENCY (EMERGENCY)
Facility: HOSPITAL | Age: 77
LOS: 1 days | Discharge: HOME | End: 2017-06-21
Attending: EMERGENCY MEDICINE | Admitting: EMERGENCY MEDICINE
Payer: MEDICARE

## 2017-06-21 VITALS
OXYGEN SATURATION: 100 % | HEART RATE: 76 BPM | DIASTOLIC BLOOD PRESSURE: 57 MMHG | RESPIRATION RATE: 16 BRPM | SYSTOLIC BLOOD PRESSURE: 110 MMHG

## 2017-06-21 VITALS — TEMPERATURE: 99 F | HEART RATE: 74 BPM

## 2017-06-21 DIAGNOSIS — Z98.49 CATARACT EXTRACTION STATUS, UNSPECIFIED EYE: Chronic | ICD-10-CM

## 2017-06-21 LAB
ALBUMIN SERPL ELPH-MCNC: 3.6 G/DL — SIGNIFICANT CHANGE UP (ref 3.3–5)
ALP SERPL-CCNC: 56 U/L — SIGNIFICANT CHANGE UP (ref 40–120)
ALT FLD-CCNC: 27 U/L — SIGNIFICANT CHANGE UP (ref 4–41)
AST SERPL-CCNC: 25 U/L — SIGNIFICANT CHANGE UP (ref 4–40)
BASE EXCESS BLDV CALC-SCNC: 3.3 MMOL/L — SIGNIFICANT CHANGE UP
BASOPHILS # BLD AUTO: 0.02 K/UL — SIGNIFICANT CHANGE UP (ref 0–0.2)
BASOPHILS NFR BLD AUTO: 0.1 % — SIGNIFICANT CHANGE UP (ref 0–2)
BILIRUB SERPL-MCNC: 0.3 MG/DL — SIGNIFICANT CHANGE UP (ref 0.2–1.2)
BLOOD GAS VENOUS - CREATININE: 0.64 MG/DL — SIGNIFICANT CHANGE UP (ref 0.5–1.3)
BUN SERPL-MCNC: 21 MG/DL — SIGNIFICANT CHANGE UP (ref 7–23)
CALCIUM SERPL-MCNC: 9.1 MG/DL — SIGNIFICANT CHANGE UP (ref 8.4–10.5)
CHLORIDE BLDV-SCNC: 103 MMOL/L — SIGNIFICANT CHANGE UP (ref 96–108)
CHLORIDE SERPL-SCNC: 99 MMOL/L — SIGNIFICANT CHANGE UP (ref 98–107)
CK MB BLD-MCNC: 1 NG/ML — SIGNIFICANT CHANGE UP (ref 1–6.6)
CK MB BLD-MCNC: SIGNIFICANT CHANGE UP (ref 0–2.5)
CK SERPL-CCNC: 25 U/L — LOW (ref 30–200)
CO2 SERPL-SCNC: 28 MMOL/L — SIGNIFICANT CHANGE UP (ref 22–31)
CREAT SERPL-MCNC: 0.76 MG/DL — SIGNIFICANT CHANGE UP (ref 0.5–1.3)
EOSINOPHIL # BLD AUTO: 0.03 K/UL — SIGNIFICANT CHANGE UP (ref 0–0.5)
EOSINOPHIL NFR BLD AUTO: 0.2 % — SIGNIFICANT CHANGE UP (ref 0–6)
GAS PNL BLDV: 131 MMOL/L — LOW (ref 136–146)
GLUCOSE BLDV-MCNC: 152 — HIGH (ref 70–99)
GLUCOSE SERPL-MCNC: 165 MG/DL — HIGH (ref 70–99)
HCO3 BLDV-SCNC: 26 MMOL/L — SIGNIFICANT CHANGE UP (ref 20–27)
HCT VFR BLD CALC: 37.3 % — LOW (ref 39–50)
HCT VFR BLDV CALC: 34.4 % — LOW (ref 39–51)
HGB BLD-MCNC: 12.2 G/DL — LOW (ref 13–17)
HGB BLDV-MCNC: 11.2 G/DL — LOW (ref 13–17)
IMM GRANULOCYTES NFR BLD AUTO: 0.1 % — SIGNIFICANT CHANGE UP (ref 0–1.5)
LACTATE BLDV-MCNC: 1.9 MMOL/L — SIGNIFICANT CHANGE UP (ref 0.5–2)
LYMPHOCYTES # BLD AUTO: 57.9 % — HIGH (ref 13–44)
LYMPHOCYTES # BLD AUTO: 7.99 K/UL — HIGH (ref 1–3.3)
MCHC RBC-ENTMCNC: 32.7 % — SIGNIFICANT CHANGE UP (ref 32–36)
MCHC RBC-ENTMCNC: 33 PG — SIGNIFICANT CHANGE UP (ref 27–34)
MCV RBC AUTO: 100.8 FL — HIGH (ref 80–100)
MONOCYTES # BLD AUTO: 0.55 K/UL — SIGNIFICANT CHANGE UP (ref 0–0.9)
MONOCYTES NFR BLD AUTO: 4 % — SIGNIFICANT CHANGE UP (ref 2–14)
NEUTROPHILS # BLD AUTO: 5.18 K/UL — SIGNIFICANT CHANGE UP (ref 1.8–7.4)
NEUTROPHILS NFR BLD AUTO: 37.7 % — LOW (ref 43–77)
PCO2 BLDV: 48 MMHG — SIGNIFICANT CHANGE UP (ref 41–51)
PH BLDV: 7.38 PH — SIGNIFICANT CHANGE UP (ref 7.32–7.43)
PLATELET # BLD AUTO: 244 K/UL — SIGNIFICANT CHANGE UP (ref 150–400)
PMV BLD: 10.6 FL — SIGNIFICANT CHANGE UP (ref 7–13)
PO2 BLDV: 38 MMHG — SIGNIFICANT CHANGE UP (ref 35–40)
POTASSIUM BLDV-SCNC: 3.5 MMOL/L — SIGNIFICANT CHANGE UP (ref 3.4–4.5)
POTASSIUM SERPL-MCNC: 4.1 MMOL/L — SIGNIFICANT CHANGE UP (ref 3.5–5.3)
POTASSIUM SERPL-SCNC: 4.1 MMOL/L — SIGNIFICANT CHANGE UP (ref 3.5–5.3)
PROT SERPL-MCNC: 6.1 G/DL — SIGNIFICANT CHANGE UP (ref 6–8.3)
RBC # BLD: 3.7 M/UL — LOW (ref 4.2–5.8)
RBC # FLD: 12.8 % — SIGNIFICANT CHANGE UP (ref 10.3–14.5)
SAO2 % BLDV: 68.7 % — SIGNIFICANT CHANGE UP (ref 60–85)
SODIUM SERPL-SCNC: 138 MMOL/L — SIGNIFICANT CHANGE UP (ref 135–145)
TROPONIN T SERPL-MCNC: < 0.06 NG/ML — SIGNIFICANT CHANGE UP (ref 0–0.06)
WBC # BLD: 13.79 K/UL — HIGH (ref 3.8–10.5)
WBC # FLD AUTO: 13.79 K/UL — HIGH (ref 3.8–10.5)

## 2017-06-21 PROCEDURE — 99285 EMERGENCY DEPT VISIT HI MDM: CPT | Mod: 25,GC

## 2017-06-21 PROCEDURE — 93010 ELECTROCARDIOGRAM REPORT: CPT | Mod: 59

## 2017-06-21 PROCEDURE — 71010: CPT | Mod: 26

## 2017-06-21 PROCEDURE — 99232 SBSQ HOSP IP/OBS MODERATE 35: CPT

## 2017-06-21 RX ORDER — SODIUM CHLORIDE 9 MG/ML
1000 INJECTION INTRAMUSCULAR; INTRAVENOUS; SUBCUTANEOUS ONCE
Qty: 0 | Refills: 0 | Status: COMPLETED | OUTPATIENT
Start: 2017-06-21 | End: 2017-06-21

## 2017-06-21 RX ORDER — SERTRALINE 25 MG/1
50 TABLET, FILM COATED ORAL DAILY
Qty: 0 | Refills: 0 | Status: DISCONTINUED | OUTPATIENT
Start: 2017-06-21 | End: 2017-06-22

## 2017-06-21 RX ADMIN — TAMSULOSIN HYDROCHLORIDE 0.4 MILLIGRAM(S): 0.4 CAPSULE ORAL at 21:04

## 2017-06-21 RX ADMIN — GABAPENTIN 300 MILLIGRAM(S): 400 CAPSULE ORAL at 21:04

## 2017-06-21 RX ADMIN — GABAPENTIN 300 MILLIGRAM(S): 400 CAPSULE ORAL at 09:32

## 2017-06-21 RX ADMIN — Medication 1 APPLICATION(S): at 21:04

## 2017-06-21 RX ADMIN — SERTRALINE 50 MILLIGRAM(S): 25 TABLET, FILM COATED ORAL at 09:32

## 2017-06-21 RX ADMIN — Medication 5 MILLIGRAM(S): at 21:04

## 2017-06-21 RX ADMIN — Medication 1 APPLICATION(S): at 11:37

## 2017-06-21 RX ADMIN — RISPERIDONE 3 MILLIGRAM(S): 4 TABLET ORAL at 21:04

## 2017-06-21 RX ADMIN — RISPERIDONE 2 MILLIGRAM(S): 4 TABLET ORAL at 09:32

## 2017-06-21 RX ADMIN — SODIUM CHLORIDE 1000 MILLILITER(S): 9 INJECTION INTRAMUSCULAR; INTRAVENOUS; SUBCUTANEOUS at 04:43

## 2017-06-21 RX ADMIN — SODIUM CHLORIDE 1000 MILLILITER(S): 9 INJECTION INTRAMUSCULAR; INTRAVENOUS; SUBCUTANEOUS at 02:33

## 2017-06-21 RX ADMIN — FINASTERIDE 5 MILLIGRAM(S): 5 TABLET, FILM COATED ORAL at 09:32

## 2017-06-21 RX ADMIN — GABAPENTIN 300 MILLIGRAM(S): 400 CAPSULE ORAL at 18:09

## 2017-06-21 NOTE — CHART NOTE - NSCHARTNOTEFT_GEN_A_CORE
[facility] Inpatient to ED Transfer Summary    Reason for Transfer/Medical Summary:  This evening pt's inguinal hernia protruded out and it was reduced by the evening PAM.  Abdominal binder placed. Inguinal hernia appears to have protuded into his right scrotum.  Pt denies any pain.  Pt noted with a bp at 2330 of 70/40.  Pt stated that he didn't eat a lot today. Pt instructed to eat and drink some water. Repeat bp improved but pt was stating that he felt weak and dizzy.  Orthostatics done, sitting BP 80/48, HR 72; standing BP 70/40, HR 68.  Temp 99.7.  RR 16.  Pt continues to feel dizzy and weak.  Blood sugar 114.  Transferred to ED for management of hypotension.       PAST MEDICAL & SURGICAL HISTORY:  Cataracts, bilateral  Anxiety  Chronic leukemia  Prostate cancer  Hernia, inguinal, right  Depression  Anxiety  Depression  CLL (chronic lymphocytic leukemia)  Hydrocele  Varicocele  Cataract  Umbilical hernia  Prostate cancer  Status post cataract extraction, unspecified laterality  No significant past surgical history  No significant past surgical history      Allergies    latex (Rash)  latex (Unknown)  lidocaine (Hypotension)  lidocaine (Unknown)    Intolerances        MEDICATIONS  (STANDING):  finasteride 5milliGRAM(s) Oral daily  tamsulosin 0.4milliGRAM(s) Oral at bedtime  gabapentin 300milliGRAM(s) Oral three times a day  risperiDONE   Tablet 3milliGRAM(s) Oral at bedtime  bisacodyl 5milliGRAM(s) Oral at bedtime  risperiDONE   Tablet 2milliGRAM(s) Oral daily  triamcinolone 0.1% Cream 1Application(s) Topical two times a day  sertraline 100milliGRAM(s) Oral daily    MEDICATIONS  (PRN):  magnesium hydroxide Suspension 30milliLiter(s) Oral daily PRN Constipation  diphenhydrAMINE   Capsule 25milliGRAM(s) Oral every 6 hours PRN Rash and/or Itching  LORazepam     Tablet 0.5milliGRAM(s) Oral every 6 hours PRN Anxiety  LORazepam   Injectable 1milliGRAM(s) IntraMuscular once PRN Agitation      Vital Signs Last 24 Hrs  T(C): 37.6, Max: 37.6 (06-20 @ 23:55)  HR: 86 (81 - 86),   BP: 90/48 (90/48 - 116/63)  RR: 16 (16 - 18)  SpO2: --  Wt(kg): --  CAPILLARY BLOOD GLUCOSE  114 (20 Jun 2017 23:55)        PHYSICAL EXAM:  GENERAL: NAD, well-developed  HEAD:  Atraumatic, Normocephalic  EYES: EOMI, PERRLA, conjunctiva and sclera clear  NECK: Supple, No JVD  CHEST/LUNG: Clear to auscultation bilaterally; No wheeze  HEART: Regular rate and rhythm; No murmurs, rubs, or gallops  ABDOMEN: Soft, Nontender, Nondistended; Bowel sounds present  EXTREMITIES:  2+ Peripheral Pulses, No clubbing, cyanosis, or edema  PSYCH: AAOx3  NEUROLOGY: non-focal,  SKIN: generalized rash noted 2/2 allergy to wellbutrin.  Medication was dc'd.     LABS:          Psychiatry Section:  Psychiatric Summary/[facility] admitting diagnosis: Depression and anxiety    Psychiatric Recommendations: continue with all meds as ordered. Fall risk is only risk identified. Constant observation.     Observation status (check one):   ( /) Constant Observation  ( ) Enhanced care  ( ) Routine checks    Risk Status (check all that apply if present):  ( ) at risk for suicide/self-injury  ( ) at risk for aggressive behavior  ( ) at risk for elopement  ( /) other risk:   ( /)fall risk

## 2017-06-21 NOTE — ED PROVIDER NOTE - PROGRESS NOTE DETAILS
Labs, imaging, ekg appreciated and wnl. Stable vital signs. Will dc back to [facility].  Errol Salazar MD PGY-3 [place]: prelim xr possible pulm edema. Pt no SOB, lungs CTAB. Clinically not fluid overloaded. Pt observed in ED for several hrs w/ no significant hypotension. Afebrile. No abd pain or further NV. Labs wnl, benign exam. Clinically hypotension not 2/2 cardiac/sepsis/anaphylaxis. Likely hypovolemia now resolved. D/w dr. day at [facility], will transfer pt back to [facility]. Continued psych/medical management, and surg f/u for hernia. To return for recurrent hypotension or abd pain, persistent NV.

## 2017-06-21 NOTE — ED PROVIDER NOTE - OBJECTIVE STATEMENT
75 y/o male with past medical history of major depression disorder, right inguinal hernia, prostate cancer, CLL, anxiety, status post cataract surgery p/w generalized weakness, dizziness. Pt reports feeling weak and lightheaded throughout the day. Pt reports abd pain earlier in the day related to his right inguinal hernia, had 25 episodes of vomiting earlier which resolved when hernia was reduced hours ago. Pt admits to poor po intake. Pt denies abd pain now, diarrhea, constipation, chest pain, palpitations, SOB. Pt arriving from [facility], . 77 y/o male with past medical history of major depression disorder, right inguinal hernia, prostate cancer, CLL, anxiety, status post cataract surgery p/w generalized weakness, dizziness. Pt reports feeling weak and lightheaded throughout the day. Pt reports abd pain earlier in the day related to his right inguinal hernia, had 25 episodes of vomiting earlier which resolved when hernia was reduced hours ago. Pt admits to poor po intake. Pt denies abd pain now, diarrhea, constipation, chest pain, palpitations, SOB. Pt arriving from [facility], .  [place]: 76M from [facility] for MDD, R inguinal hernia ([place] [facility], surgery delayed 2/2 psych issues), prostate ca, CLL p/w generalized weakness, lightheaded. Pt was having RLQ/inguinal pain w/ multiple NBNB NV, was seemingly reduced and placed in binder. PT now denying any nausea or abd pain. No CP/SOB, urinary complaints, LE pain/swelling, black/bloody stool.

## 2017-06-21 NOTE — ED PROVIDER NOTE - ATTENDING CONTRIBUTION TO CARE
76M from [facility] for MDD, R inguinal hernia ([place] at [facility], surgery delayed 2/2 psych issues), prostate ca, CLL p/w RLQ pain, NV now resolved w/ subsequent generalized weakness, lightheaded and hypotension. Received ~300cc IVF via EMS. Pt currently asymptomatic. Vitals wnl, exam as above. EKG wnl (story and EKG not consistent w/ STEMI)   ddx: Likely had incarcerated hernia now resolved. Hypotension/lightheaded likely 2/2 dehydration/NV. Clinically no incarcerated/strangulated hernia.   CBC, cmp, IVF, reassess.

## 2017-06-21 NOTE — ED PROVIDER NOTE - PMH
Anxiety    Anxiety    Cataract    Cataracts, bilateral    Chronic leukemia    CLL (chronic lymphocytic leukemia)    Depression    Depression    Hernia, inguinal, right    Hydrocele    Prostate cancer    Prostate cancer    Umbilical hernia    Varicocele

## 2017-06-21 NOTE — ED PROVIDER NOTE - PHYSICAL EXAMINATION
lower abd binder --> RN dulko chaperone - R inguinal hernia, soft, reducible, no ttp, no rebound/guarding. no overlying skin changes.

## 2017-06-21 NOTE — ED ADULT NURSE NOTE - OBJECTIVE STATEMENT
Malcolm RN : pt. received in TR-B as a notification , Pt. arriving via EMS from  [facility] states staff noticed pt. has been weaker today than usual , bp at the field was 80/40 and ST elevations were seen on EKG at field. Pt. states he is nauseous but denies any pain. Pt. arrives with IV placed on left hand , IV placed on right arm 18 gauge. Labs drawn and sent , and placed on cardiac monitor. Pt. Vitals as noted, and in no acute distress. Will continue to monitor while in the ED. Malcolm RN : pt. received in TR-B as a notification , Pt. arriving via EMS from  [facility] states staff noticed pt. has been weaker today than usual , bp at the field was 80/40 and ST elevations were seen on EKG at field. Pt. states he is nauseous but denies any pain. pt. at the field received 162 aspirin and NS fluids. Pt. arrives with IV placed on left hand , IV placed on right arm 18 gauge. Labs drawn and sent , and placed on cardiac monitor. Pt. Vitals as noted, and in no acute distress. Will continue to monitor while in the ED.

## 2017-06-22 PROCEDURE — 99232 SBSQ HOSP IP/OBS MODERATE 35: CPT

## 2017-06-22 PROCEDURE — 93010 ELECTROCARDIOGRAM REPORT: CPT

## 2017-06-22 RX ADMIN — GABAPENTIN 300 MILLIGRAM(S): 400 CAPSULE ORAL at 20:27

## 2017-06-22 RX ADMIN — Medication 1 APPLICATION(S): at 11:51

## 2017-06-22 RX ADMIN — FINASTERIDE 5 MILLIGRAM(S): 5 TABLET, FILM COATED ORAL at 09:26

## 2017-06-22 RX ADMIN — RISPERIDONE 3 MILLIGRAM(S): 4 TABLET ORAL at 20:27

## 2017-06-22 RX ADMIN — GABAPENTIN 300 MILLIGRAM(S): 400 CAPSULE ORAL at 09:26

## 2017-06-22 RX ADMIN — GABAPENTIN 300 MILLIGRAM(S): 400 CAPSULE ORAL at 13:39

## 2017-06-22 RX ADMIN — RISPERIDONE 2 MILLIGRAM(S): 4 TABLET ORAL at 09:26

## 2017-06-23 LAB
ALBUMIN SERPL ELPH-MCNC: 3.4 G/DL — SIGNIFICANT CHANGE UP (ref 3.3–5)
ALP SERPL-CCNC: 51 U/L — SIGNIFICANT CHANGE UP (ref 40–120)
ALT FLD-CCNC: 19 U/L — SIGNIFICANT CHANGE UP (ref 4–41)
AST SERPL-CCNC: 17 U/L — SIGNIFICANT CHANGE UP (ref 4–40)
BILIRUB SERPL-MCNC: 0.4 MG/DL — SIGNIFICANT CHANGE UP (ref 0.2–1.2)
BUN SERPL-MCNC: 11 MG/DL — SIGNIFICANT CHANGE UP (ref 7–23)
CALCIUM SERPL-MCNC: 9 MG/DL — SIGNIFICANT CHANGE UP (ref 8.4–10.5)
CHLORIDE SERPL-SCNC: 98 MMOL/L — SIGNIFICANT CHANGE UP (ref 98–107)
CO2 SERPL-SCNC: 25 MMOL/L — SIGNIFICANT CHANGE UP (ref 22–31)
CREAT SERPL-MCNC: 0.51 MG/DL — SIGNIFICANT CHANGE UP (ref 0.5–1.3)
GLUCOSE SERPL-MCNC: 89 MG/DL — SIGNIFICANT CHANGE UP (ref 70–99)
HCT VFR BLD CALC: 35.2 % — LOW (ref 39–50)
HGB BLD-MCNC: 11.4 G/DL — LOW (ref 13–17)
MCHC RBC-ENTMCNC: 32.4 % — SIGNIFICANT CHANGE UP (ref 32–36)
MCHC RBC-ENTMCNC: 32.9 PG — SIGNIFICANT CHANGE UP (ref 27–34)
MCV RBC AUTO: 101.4 FL — HIGH (ref 80–100)
PLATELET # BLD AUTO: 222 K/UL — SIGNIFICANT CHANGE UP (ref 150–400)
PMV BLD: 10.6 FL — SIGNIFICANT CHANGE UP (ref 7–13)
POTASSIUM SERPL-MCNC: 4.2 MMOL/L — SIGNIFICANT CHANGE UP (ref 3.5–5.3)
POTASSIUM SERPL-SCNC: 4.2 MMOL/L — SIGNIFICANT CHANGE UP (ref 3.5–5.3)
PROT SERPL-MCNC: 6 G/DL — SIGNIFICANT CHANGE UP (ref 6–8.3)
RBC # BLD: 3.47 M/UL — LOW (ref 4.2–5.8)
RBC # FLD: 13.1 % — SIGNIFICANT CHANGE UP (ref 10.3–14.5)
SODIUM SERPL-SCNC: 135 MMOL/L — SIGNIFICANT CHANGE UP (ref 135–145)
WBC # BLD: 13.38 K/UL — HIGH (ref 3.8–10.5)
WBC # FLD AUTO: 13.38 K/UL — HIGH (ref 3.8–10.5)

## 2017-06-23 PROCEDURE — 99232 SBSQ HOSP IP/OBS MODERATE 35: CPT

## 2017-06-23 RX ADMIN — GABAPENTIN 300 MILLIGRAM(S): 400 CAPSULE ORAL at 21:55

## 2017-06-23 RX ADMIN — TAMSULOSIN HYDROCHLORIDE 0.4 MILLIGRAM(S): 0.4 CAPSULE ORAL at 21:55

## 2017-06-23 RX ADMIN — GABAPENTIN 300 MILLIGRAM(S): 400 CAPSULE ORAL at 12:55

## 2017-06-23 RX ADMIN — RISPERIDONE 2 MILLIGRAM(S): 4 TABLET ORAL at 08:39

## 2017-06-23 RX ADMIN — GABAPENTIN 300 MILLIGRAM(S): 400 CAPSULE ORAL at 08:39

## 2017-06-23 RX ADMIN — RISPERIDONE 3 MILLIGRAM(S): 4 TABLET ORAL at 21:55

## 2017-06-23 RX ADMIN — Medication 5 MILLIGRAM(S): at 21:55

## 2017-06-23 RX ADMIN — FINASTERIDE 5 MILLIGRAM(S): 5 TABLET, FILM COATED ORAL at 08:39

## 2017-06-24 LAB
APPEARANCE UR: CLEAR — SIGNIFICANT CHANGE UP
BILIRUB UR-MCNC: NEGATIVE — SIGNIFICANT CHANGE UP
BLOOD UR QL VISUAL: NEGATIVE — SIGNIFICANT CHANGE UP
COLOR SPEC: YELLOW — SIGNIFICANT CHANGE UP
GLUCOSE UR-MCNC: NEGATIVE — SIGNIFICANT CHANGE UP
KETONES UR-MCNC: NEGATIVE — SIGNIFICANT CHANGE UP
LEUKOCYTE ESTERASE UR-ACNC: SIGNIFICANT CHANGE UP
MUCOUS THREADS # UR AUTO: SIGNIFICANT CHANGE UP
NITRITE UR-MCNC: NEGATIVE — SIGNIFICANT CHANGE UP
PH UR: 6 — SIGNIFICANT CHANGE UP (ref 4.6–8)
PROT UR-MCNC: 10 — SIGNIFICANT CHANGE UP
RBC CASTS # UR COMP ASSIST: SIGNIFICANT CHANGE UP (ref 0–?)
SP GR SPEC: 1.02 — SIGNIFICANT CHANGE UP (ref 1–1.03)
UROBILINOGEN FLD QL: NORMAL E.U. — SIGNIFICANT CHANGE UP (ref 0.1–0.2)
WBC UR QL: HIGH (ref 0–?)

## 2017-06-24 PROCEDURE — 99231 SBSQ HOSP IP/OBS SF/LOW 25: CPT

## 2017-06-24 PROCEDURE — 99233 SBSQ HOSP IP/OBS HIGH 50: CPT

## 2017-06-24 RX ADMIN — GABAPENTIN 300 MILLIGRAM(S): 400 CAPSULE ORAL at 12:37

## 2017-06-24 RX ADMIN — TAMSULOSIN HYDROCHLORIDE 0.4 MILLIGRAM(S): 0.4 CAPSULE ORAL at 21:05

## 2017-06-24 RX ADMIN — RISPERIDONE 2 MILLIGRAM(S): 4 TABLET ORAL at 08:45

## 2017-06-24 RX ADMIN — GABAPENTIN 300 MILLIGRAM(S): 400 CAPSULE ORAL at 08:50

## 2017-06-24 RX ADMIN — Medication 5 MILLIGRAM(S): at 21:05

## 2017-06-24 RX ADMIN — FINASTERIDE 5 MILLIGRAM(S): 5 TABLET, FILM COATED ORAL at 08:45

## 2017-06-24 RX ADMIN — GABAPENTIN 300 MILLIGRAM(S): 400 CAPSULE ORAL at 21:05

## 2017-06-24 RX ADMIN — RISPERIDONE 3 MILLIGRAM(S): 4 TABLET ORAL at 21:05

## 2017-06-24 NOTE — CONSULT NOTE ADULT - ASSESSMENT
76 year old male with psych disorder now with exacerbation being evaluated for ECT.  1.  Chronic Lymphocytic Leukemia with WBC 13.38-STABLE  2.  Prostate CA:  3.  Hypotension:  push fluids  4.  R inguinal hernia:  Abdominal binder.  Surgery consult when psychiatrically stable.  5.  Dermatology:  Seen by Derm.  Drug rash secondary to buproprion.  6.  Psych:  pt cleared medically for planned ECT. 76 year old male with psych disorder now with exacerbation being evaluated for ECT.  1.  Chronic Lymphocytic Leukemia with WBC 13.38-STABLE  2.  Prostate CA:  check PSA  3.  Hypotension:  push fluids now stable  4.  R inguinal hernia:  Abdominal binder.  Surgery consult when psychiatrically stable.  5.  Dermatology:  Seen by Derm.  Drug rash secondary to buproprion.  6. :  recent onset urinary urgency.  check u/a  7.  Psych:  pt cleared medically for planned ECT.

## 2017-06-24 NOTE — CONSULT NOTE ADULT - SUBJECTIVE AND OBJECTIVE BOX
HPI:   Asked by attending Dr. STOKES to see this 76 year old male with psych disorder now with exacerbation of depression in need of ECT for medical clearance for ECT.  Pt with extensive medical history.  Pt has CLL, Prostate CA, Right Inguinal Hernia, Hypotension.    During hospital stay, patient developed a rash/drug eruption secondary to buproprion as per Derm consult.  Pt also 2017 not drinking and became hypotensive with blood pressure 70/40 and was transferred to ED for IV hydration.        PAST MEDICAL & SURGICAL HISTORY:  Cataracts, bilateral extracted  Anxiety  Chronic leukemia  Prostate cancer  Hernia, inguinal, right  Depression  Anxiety  Depression  CLL (chronic lymphocytic leukemia)  Hydrocele  Varicocele  Cataract  Umbilical hernia  Prostate cancer  Status post cataract extraction, unspecified laterality  No significant past surgical history  No significant past surgical history      Review of Systems:   CONSTITUTIONAL: No fever, weight loss, or fatigue  EYES: No eye pain, visual disturbances, or discharge  ENMT:  No difficulty hearing, tinnitus, vertigo; No sinus or throat pain  NECK: No pain or stiffness  RESPIRATORY: No cough, wheezing, chills or hemoptysis; No shortness of breath  CARDIOVASCULAR: No chest pain, palpitations, dizziness, or leg swelling  GASTROINTESTINAL: No abdominal or epigastric pain. No nausea, vomiting, or hematemesis; No diarrhea or constipation. No melena or hematochezia.  GENITOURINARY: +Prostate CA  NEUROLOGICAL: No headaches, memory loss, loss of strength, numbness, or tremors  SKIN: No itching, burning, rashes, or lesions   LYMPH NODES: No enlarged glands  ENDOCRINE: No heat or cold intolerance; No hair loss  MUSCULOSKELETAL: No joint pain or swelling; No muscle, back, or extremity pain  HEME/LYMPH: No easy bruising, or bleeding gums      Allergies    latex (Rash)  latex (Unknown)  lidocaine (Hypotension)  lidocaine (Unknown)    Social History:  -CIGS, -ETOH, -DRUGS.    FAMILY HISTORY:  No pertinent family history in first degree relatives  MOTHER , FATHER     MEDICATIONS  (STANDING):  finasteride 5milliGRAM(s) Oral daily  tamsulosin 0.4milliGRAM(s) Oral at bedtime  gabapentin 300milliGRAM(s) Oral three times a day  risperiDONE   Tablet 3milliGRAM(s) Oral at bedtime  bisacodyl 5milliGRAM(s) Oral at bedtime  risperiDONE   Tablet 2milliGRAM(s) Oral daily    MEDICATIONS  (PRN):  magnesium hydroxide Suspension 30milliLiter(s) Oral daily PRN Constipation  diphenhydrAMINE   Capsule 25milliGRAM(s) Oral every 6 hours PRN Rash and/or Itching  LORazepam     Tablet 0.5milliGRAM(s) Oral every 6 hours PRN Anxiety  LORazepam   Injectable 1milliGRAM(s) IntraMuscular once PRN Agitation      Vital Signs Last 24 Hrs  T(C): 36.8, Max: 36.8 (- @ 15:35)  HR: -- 86  BP: -- 110/62  RR: -- 16  SpO2: --        PHYSICAL EXAM:  GENERAL: NAD, well-developed  HEAD:  Atraumatic, Normocephalic  EYES: EOMI, PERRLA, conjunctiva and sclera clear  NECK: Supple, No JVD  CHEST/LUNG: Clear to auscultation bilaterally; No wheeze  HEART: Regular rate and rhythm; No murmurs, rubs, or gallops  ABDOMEN: Soft, Nontender, Nondistended; Bowel sounds present  EXTREMITIES:  2+ Peripheral Pulses, No clubbing, cyanosis, or edema  NEUROLOGY: non-focal  SKIN:     LABS:                        11.4   13.38 )-----------( 222      ( 2017 08:13 )             35.2     -    135  |  98  |  11  ----------------------------<  89  4.2   |  25  |  0.51    Ca    9.0      2017 08:13    TPro  6.0  /  Alb  3.4  /  TBili  0.4  /  DBili  x   /  AST  17  /  ALT  19  /  AlkPhos  51  -2017  TSH 2.48  2017  HGAIC 5.4      EK2017  NSR WNL    RADIOLOGY & ADDITIONAL TESTS:    2017  CT OF HEAD W/O C:  UNREMARKABLE BRAIN FOR AGE ON NONCONTRAST STUDY.    2017  CXR:  MILD PROMINENCE OF INTERSTITIUM W/O CONSOLIDATION        Consultant(s) Notes Reviewed:  DERMATOLOGY AND HOSPITALIST NOTES    Care Discussed with Consultants/Other Providers: ATTENDING AND STAFF HPI:   Asked by attending Dr. STOKES to see this 76 year old male with psych disorder now with exacerbation of depression in need of ECT for medical clearance for ECT.  Pt with extensive medical history.  Pt has CLL, Prostate CA, Right Inguinal Hernia, Hypotension.    Pt states Prostate CA since  diagnosed by biopsy treated with Proscar.  No hx of chemo, radiation or surgery.    +CLL stable as per patient with f/u pending.    +R inguinal hernia pending surgical repair.    During hospital stay, patient developed a rash/drug eruption secondary to buproprion as per Derm consult.  Pt also 2017 not drinking and became hypotensive with blood pressure 70/40 and was transferred to ED for IV hydration.    Pt complains of urinary urgency recent onset worried it's associated with his right inguinal hernia.        PAST MEDICAL & SURGICAL HISTORY:  Cataracts, bilateral extracted  Anxiety  Chronic leukemia  Prostate cancer  Hernia, inguinal, right  Depression  Anxiety  Depression  CLL (chronic lymphocytic leukemia)  Hydrocele  Varicocele  Cataract  Umbilical hernia  Prostate cancer  Status post cataract extraction, unspecified laterality  No significant past surgical history  No significant past surgical history      Review of Systems:   CONSTITUTIONAL: No fever, weight loss, or fatigue  EYES: No eye pain, visual disturbances, or discharge  ENMT:  No difficulty hearing, tinnitus, vertigo; No sinus or throat pain  NECK: No pain or stiffness  RESPIRATORY: No cough, wheezing, chills or hemoptysis; No shortness of breath  CARDIOVASCULAR: No chest pain, palpitations, dizziness, or leg swelling  GASTROINTESTINAL: No abdominal or epigastric pain. No nausea, vomiting, or hematemesis; No diarrhea or constipation. No melena or hematochezia.  GENITOURINARY: +Prostate CA   NEUROLOGICAL: No headaches, memory loss, loss of strength, numbness, or tremors  SKIN: No itching, burning, rashes, or lesions   LYMPH NODES: No enlarged glands  ENDOCRINE: No heat or cold intolerance; No hair loss  MUSCULOSKELETAL: No joint pain or swelling; No muscle, back, or extremity pain  HEME/LYMPH: No easy bruising, or bleeding gums      Allergies    latex (Rash)  latex (Unknown)  lidocaine (Hypotension)  lidocaine (Unknown)    Social History:  -CIGS, -ETOH, -DRUGS.    FAMILY HISTORY:  No pertinent family history in first degree relatives  MOTHER , FATHER     MEDICATIONS  (STANDING):  finasteride 5milliGRAM(s) Oral daily  tamsulosin 0.4milliGRAM(s) Oral at bedtime  gabapentin 300milliGRAM(s) Oral three times a day  risperiDONE   Tablet 3milliGRAM(s) Oral at bedtime  bisacodyl 5milliGRAM(s) Oral at bedtime  risperiDONE   Tablet 2milliGRAM(s) Oral daily    MEDICATIONS  (PRN):  magnesium hydroxide Suspension 30milliLiter(s) Oral daily PRN Constipation  diphenhydrAMINE   Capsule 25milliGRAM(s) Oral every 6 hours PRN Rash and/or Itching  LORazepam     Tablet 0.5milliGRAM(s) Oral every 6 hours PRN Anxiety  LORazepam   Injectable 1milliGRAM(s) IntraMuscular once PRN Agitation      Vital Signs Last 24 Hrs  T(C): 36.8, Max: 36.8 ( @ 15:35)  HR: -- 86  BP: -- 110/62  RR: -- 16  SpO2: --96%        PHYSICAL EXAM:  GENERAL: NAD, well-developed  HEAD:  Atraumatic, Normocephalic  EYES: EOMI, PERRLA, conjunctiva and sclera clear  NECK: Supple, No JVD  CHEST/LUNG: Clear to auscultation bilaterally; No wheeze  HEART: Regular rate and rhythm; No murmurs, rubs, or gallops  ABDOMEN: Soft, Nontender, Nondistended; Bowel sounds present.  Large right inguinal hernia extending into scrotum nontender.  EXTREMITIES:  2+ Peripheral Pulses, No clubbing, cyanosis, or edema  NEUROLOGY: non-focal  SKIN:  intact    LABS:                        11.4   13.38 )-----------( 222      ( 2017 08:13 )             35.2         135  |  98  |  11  ----------------------------<  89  4.2   |  25  |  0.51    Ca    9.0      2017 08:13    TPro  6.0  /  Alb  3.4  /  TBili  0.4  /  DBili  x   /  AST  17  /  ALT  19  /  AlkPhos  51  -2017  TSH 2.48  2017  HGAIC 5.4      EK2017  NSR WNL    RADIOLOGY & ADDITIONAL TESTS:    2017  CT OF HEAD W/O C:  UNREMARKABLE BRAIN FOR AGE ON NONCONTRAST STUDY.    2017  CXR:  MILD PROMINENCE OF INTERSTITIUM W/O CONSOLIDATION        Consultant(s) Notes Reviewed:  DERMATOLOGY AND HOSPITALIST NOTES    Care Discussed with Consultants/Other Providers: ATTENDING AND STAFF

## 2017-06-25 LAB
FOLATE SERPL-MCNC: 17.5 NG/ML — SIGNIFICANT CHANGE UP (ref 4.7–20)
PSA FLD-MCNC: 34.68 NG/ML — HIGH (ref 0–4)
VIT B12 SERPL-MCNC: 492 PG/ML — SIGNIFICANT CHANGE UP (ref 200–900)

## 2017-06-25 PROCEDURE — 99231 SBSQ HOSP IP/OBS SF/LOW 25: CPT

## 2017-06-25 RX ORDER — TAMSULOSIN HYDROCHLORIDE 0.4 MG/1
0.4 CAPSULE ORAL AT BEDTIME
Qty: 0 | Refills: 0 | Status: DISCONTINUED | OUTPATIENT
Start: 2017-06-25 | End: 2017-07-05

## 2017-06-25 RX ORDER — GABAPENTIN 400 MG/1
300 CAPSULE ORAL THREE TIMES A DAY
Qty: 0 | Refills: 0 | Status: DISCONTINUED | OUTPATIENT
Start: 2017-06-25 | End: 2017-07-24

## 2017-06-25 RX ORDER — FINASTERIDE 5 MG/1
5 TABLET, FILM COATED ORAL DAILY
Qty: 0 | Refills: 0 | Status: DISCONTINUED | OUTPATIENT
Start: 2017-06-25 | End: 2017-07-05

## 2017-06-25 RX ADMIN — GABAPENTIN 300 MILLIGRAM(S): 400 CAPSULE ORAL at 09:02

## 2017-06-25 RX ADMIN — RISPERIDONE 3 MILLIGRAM(S): 4 TABLET ORAL at 21:04

## 2017-06-25 RX ADMIN — GABAPENTIN 300 MILLIGRAM(S): 400 CAPSULE ORAL at 13:09

## 2017-06-25 RX ADMIN — RISPERIDONE 2 MILLIGRAM(S): 4 TABLET ORAL at 09:02

## 2017-06-25 RX ADMIN — FINASTERIDE 5 MILLIGRAM(S): 5 TABLET, FILM COATED ORAL at 09:02

## 2017-06-26 PROCEDURE — 90870 ELECTROCONVULSIVE THERAPY: CPT

## 2017-06-26 PROCEDURE — 99232 SBSQ HOSP IP/OBS MODERATE 35: CPT | Mod: 25

## 2017-06-26 RX ADMIN — Medication 5 MILLIGRAM(S): at 20:23

## 2017-06-26 RX ADMIN — RISPERIDONE 3 MILLIGRAM(S): 4 TABLET ORAL at 20:23

## 2017-06-26 RX ADMIN — GABAPENTIN 300 MILLIGRAM(S): 400 CAPSULE ORAL at 20:23

## 2017-06-26 RX ADMIN — TAMSULOSIN HYDROCHLORIDE 0.4 MILLIGRAM(S): 0.4 CAPSULE ORAL at 20:23

## 2017-06-26 RX ADMIN — GABAPENTIN 300 MILLIGRAM(S): 400 CAPSULE ORAL at 12:35

## 2017-06-27 PROCEDURE — 99232 SBSQ HOSP IP/OBS MODERATE 35: CPT

## 2017-06-27 RX ADMIN — RISPERIDONE 3 MILLIGRAM(S): 4 TABLET ORAL at 20:26

## 2017-06-27 RX ADMIN — GABAPENTIN 300 MILLIGRAM(S): 400 CAPSULE ORAL at 13:36

## 2017-06-27 RX ADMIN — FINASTERIDE 5 MILLIGRAM(S): 5 TABLET, FILM COATED ORAL at 08:19

## 2017-06-27 RX ADMIN — TAMSULOSIN HYDROCHLORIDE 0.4 MILLIGRAM(S): 0.4 CAPSULE ORAL at 20:26

## 2017-06-27 RX ADMIN — GABAPENTIN 300 MILLIGRAM(S): 400 CAPSULE ORAL at 08:19

## 2017-06-27 RX ADMIN — RISPERIDONE 2 MILLIGRAM(S): 4 TABLET ORAL at 08:19

## 2017-06-27 RX ADMIN — Medication 5 MILLIGRAM(S): at 20:26

## 2017-06-28 PROCEDURE — 90870 ELECTROCONVULSIVE THERAPY: CPT

## 2017-06-28 PROCEDURE — 99232 SBSQ HOSP IP/OBS MODERATE 35: CPT | Mod: 25

## 2017-06-28 RX ADMIN — GABAPENTIN 300 MILLIGRAM(S): 400 CAPSULE ORAL at 21:17

## 2017-06-28 RX ADMIN — TAMSULOSIN HYDROCHLORIDE 0.4 MILLIGRAM(S): 0.4 CAPSULE ORAL at 21:17

## 2017-06-28 RX ADMIN — RISPERIDONE 3 MILLIGRAM(S): 4 TABLET ORAL at 21:17

## 2017-06-28 RX ADMIN — FINASTERIDE 5 MILLIGRAM(S): 5 TABLET, FILM COATED ORAL at 11:53

## 2017-06-28 RX ADMIN — Medication 5 MILLIGRAM(S): at 21:17

## 2017-06-28 RX ADMIN — GABAPENTIN 300 MILLIGRAM(S): 400 CAPSULE ORAL at 12:04

## 2017-06-29 PROCEDURE — 99232 SBSQ HOSP IP/OBS MODERATE 35: CPT

## 2017-06-29 RX ADMIN — GABAPENTIN 300 MILLIGRAM(S): 400 CAPSULE ORAL at 09:27

## 2017-06-29 RX ADMIN — GABAPENTIN 300 MILLIGRAM(S): 400 CAPSULE ORAL at 21:24

## 2017-06-29 RX ADMIN — GABAPENTIN 300 MILLIGRAM(S): 400 CAPSULE ORAL at 12:33

## 2017-06-29 RX ADMIN — RISPERIDONE 3 MILLIGRAM(S): 4 TABLET ORAL at 21:24

## 2017-06-29 RX ADMIN — RISPERIDONE 2 MILLIGRAM(S): 4 TABLET ORAL at 09:27

## 2017-06-29 RX ADMIN — FINASTERIDE 5 MILLIGRAM(S): 5 TABLET, FILM COATED ORAL at 09:27

## 2017-06-29 RX ADMIN — TAMSULOSIN HYDROCHLORIDE 0.4 MILLIGRAM(S): 0.4 CAPSULE ORAL at 21:24

## 2017-06-29 RX ADMIN — Medication 5 MILLIGRAM(S): at 21:24

## 2017-06-30 PROCEDURE — 99232 SBSQ HOSP IP/OBS MODERATE 35: CPT

## 2017-06-30 RX ADMIN — GABAPENTIN 300 MILLIGRAM(S): 400 CAPSULE ORAL at 08:50

## 2017-06-30 RX ADMIN — RISPERIDONE 3 MILLIGRAM(S): 4 TABLET ORAL at 20:43

## 2017-06-30 RX ADMIN — RISPERIDONE 2 MILLIGRAM(S): 4 TABLET ORAL at 08:50

## 2017-06-30 RX ADMIN — Medication 5 MILLIGRAM(S): at 20:42

## 2017-06-30 RX ADMIN — GABAPENTIN 300 MILLIGRAM(S): 400 CAPSULE ORAL at 12:58

## 2017-06-30 RX ADMIN — GABAPENTIN 300 MILLIGRAM(S): 400 CAPSULE ORAL at 20:42

## 2017-06-30 RX ADMIN — FINASTERIDE 5 MILLIGRAM(S): 5 TABLET, FILM COATED ORAL at 08:50

## 2017-06-30 RX ADMIN — TAMSULOSIN HYDROCHLORIDE 0.4 MILLIGRAM(S): 0.4 CAPSULE ORAL at 20:43

## 2017-07-01 RX ADMIN — GABAPENTIN 300 MILLIGRAM(S): 400 CAPSULE ORAL at 09:22

## 2017-07-01 RX ADMIN — GABAPENTIN 300 MILLIGRAM(S): 400 CAPSULE ORAL at 12:34

## 2017-07-01 RX ADMIN — GABAPENTIN 300 MILLIGRAM(S): 400 CAPSULE ORAL at 21:03

## 2017-07-01 RX ADMIN — RISPERIDONE 3 MILLIGRAM(S): 4 TABLET ORAL at 21:03

## 2017-07-01 RX ADMIN — TAMSULOSIN HYDROCHLORIDE 0.4 MILLIGRAM(S): 0.4 CAPSULE ORAL at 21:03

## 2017-07-01 RX ADMIN — RISPERIDONE 2 MILLIGRAM(S): 4 TABLET ORAL at 09:22

## 2017-07-01 RX ADMIN — Medication 5 MILLIGRAM(S): at 21:03

## 2017-07-01 RX ADMIN — FINASTERIDE 5 MILLIGRAM(S): 5 TABLET, FILM COATED ORAL at 09:22

## 2017-07-02 RX ADMIN — GABAPENTIN 300 MILLIGRAM(S): 400 CAPSULE ORAL at 09:02

## 2017-07-02 RX ADMIN — RISPERIDONE 2 MILLIGRAM(S): 4 TABLET ORAL at 09:03

## 2017-07-02 RX ADMIN — FINASTERIDE 5 MILLIGRAM(S): 5 TABLET, FILM COATED ORAL at 09:02

## 2017-07-02 RX ADMIN — GABAPENTIN 300 MILLIGRAM(S): 400 CAPSULE ORAL at 12:46

## 2017-07-02 RX ADMIN — TAMSULOSIN HYDROCHLORIDE 0.4 MILLIGRAM(S): 0.4 CAPSULE ORAL at 21:10

## 2017-07-02 RX ADMIN — RISPERIDONE 3 MILLIGRAM(S): 4 TABLET ORAL at 21:44

## 2017-07-02 RX ADMIN — GABAPENTIN 300 MILLIGRAM(S): 400 CAPSULE ORAL at 21:10

## 2017-07-02 RX ADMIN — Medication 5 MILLIGRAM(S): at 21:10

## 2017-07-03 PROCEDURE — 99232 SBSQ HOSP IP/OBS MODERATE 35: CPT

## 2017-07-03 RX ADMIN — FINASTERIDE 5 MILLIGRAM(S): 5 TABLET, FILM COATED ORAL at 09:25

## 2017-07-03 RX ADMIN — GABAPENTIN 300 MILLIGRAM(S): 400 CAPSULE ORAL at 09:25

## 2017-07-03 RX ADMIN — TAMSULOSIN HYDROCHLORIDE 0.4 MILLIGRAM(S): 0.4 CAPSULE ORAL at 22:39

## 2017-07-03 RX ADMIN — GABAPENTIN 300 MILLIGRAM(S): 400 CAPSULE ORAL at 13:13

## 2017-07-03 RX ADMIN — GABAPENTIN 300 MILLIGRAM(S): 400 CAPSULE ORAL at 22:38

## 2017-07-03 RX ADMIN — Medication 5 MILLIGRAM(S): at 22:38

## 2017-07-03 RX ADMIN — RISPERIDONE 2 MILLIGRAM(S): 4 TABLET ORAL at 09:25

## 2017-07-03 RX ADMIN — RISPERIDONE 3 MILLIGRAM(S): 4 TABLET ORAL at 22:39

## 2017-07-04 RX ADMIN — TAMSULOSIN HYDROCHLORIDE 0.4 MILLIGRAM(S): 0.4 CAPSULE ORAL at 20:47

## 2017-07-04 RX ADMIN — GABAPENTIN 300 MILLIGRAM(S): 400 CAPSULE ORAL at 20:47

## 2017-07-04 RX ADMIN — RISPERIDONE 2 MILLIGRAM(S): 4 TABLET ORAL at 09:16

## 2017-07-04 RX ADMIN — GABAPENTIN 300 MILLIGRAM(S): 400 CAPSULE ORAL at 12:42

## 2017-07-04 RX ADMIN — RISPERIDONE 3 MILLIGRAM(S): 4 TABLET ORAL at 20:47

## 2017-07-04 RX ADMIN — FINASTERIDE 5 MILLIGRAM(S): 5 TABLET, FILM COATED ORAL at 09:15

## 2017-07-04 RX ADMIN — GABAPENTIN 300 MILLIGRAM(S): 400 CAPSULE ORAL at 09:16

## 2017-07-04 RX ADMIN — Medication 5 MILLIGRAM(S): at 20:47

## 2017-07-05 LAB
BASOPHILS # BLD AUTO: 0.04 K/UL — SIGNIFICANT CHANGE UP (ref 0–0.2)
BASOPHILS NFR BLD AUTO: 0.4 % — SIGNIFICANT CHANGE UP (ref 0–2)
BASOPHILS NFR SPEC: 0 % — SIGNIFICANT CHANGE UP (ref 0–2)
EOSINOPHIL # BLD AUTO: 0.16 K/UL — SIGNIFICANT CHANGE UP (ref 0–0.5)
EOSINOPHIL NFR BLD AUTO: 1.4 % — SIGNIFICANT CHANGE UP (ref 0–6)
EOSINOPHIL NFR FLD: 1 % — SIGNIFICANT CHANGE UP (ref 0–6)
HCT VFR BLD CALC: 37.1 % — LOW (ref 39–50)
HGB BLD-MCNC: 12 G/DL — LOW (ref 13–17)
IMM GRANULOCYTES # BLD AUTO: 0.02 # — SIGNIFICANT CHANGE UP
IMM GRANULOCYTES NFR BLD AUTO: 0.2 % — SIGNIFICANT CHANGE UP (ref 0–1.5)
LG PLATELETS BLD QL AUTO: SLIGHT — SIGNIFICANT CHANGE UP
LYMPHOCYTES # BLD AUTO: 65.5 % — HIGH (ref 13–44)
LYMPHOCYTES # BLD AUTO: 7.47 K/UL — HIGH (ref 1–3.3)
LYMPHOCYTES NFR SPEC AUTO: 65 % — HIGH (ref 13–44)
MACROCYTES BLD QL: SIGNIFICANT CHANGE UP
MANUAL SMEAR VERIFICATION: SIGNIFICANT CHANGE UP
MCHC RBC-ENTMCNC: 32.3 % — SIGNIFICANT CHANGE UP (ref 32–36)
MCHC RBC-ENTMCNC: 32.5 PG — SIGNIFICANT CHANGE UP (ref 27–34)
MCV RBC AUTO: 100.5 FL — HIGH (ref 80–100)
MICROCYTES BLD QL: SLIGHT — SIGNIFICANT CHANGE UP
MONOCYTES # BLD AUTO: 1.04 K/UL — HIGH (ref 0–0.9)
MONOCYTES NFR BLD AUTO: 9.1 % — SIGNIFICANT CHANGE UP (ref 2–14)
MONOCYTES NFR BLD: 6 % — SIGNIFICANT CHANGE UP (ref 2–9)
NEUTROPHIL AB SER-ACNC: 28 % — LOW (ref 43–77)
NEUTROPHILS # BLD AUTO: 2.67 K/UL — SIGNIFICANT CHANGE UP (ref 1.8–7.4)
NEUTROPHILS NFR BLD AUTO: 23.4 % — LOW (ref 43–77)
NRBC # FLD: 0 — SIGNIFICANT CHANGE UP
OVALOCYTES BLD QL SMEAR: SLIGHT — SIGNIFICANT CHANGE UP
PLATELET # BLD AUTO: 217 K/UL — SIGNIFICANT CHANGE UP (ref 150–400)
PLATELET COUNT - ESTIMATE: NORMAL — SIGNIFICANT CHANGE UP
PMV BLD: 10.2 FL — SIGNIFICANT CHANGE UP (ref 7–13)
RBC # BLD: 3.69 M/UL — LOW (ref 4.2–5.8)
RBC # FLD: 12.3 % — SIGNIFICANT CHANGE UP (ref 10.3–14.5)
SMUDGE CELLS # BLD: PRESENT — SIGNIFICANT CHANGE UP
WBC # BLD: 11.4 K/UL — HIGH (ref 3.8–10.5)
WBC # FLD AUTO: 11.4 K/UL — HIGH (ref 3.8–10.5)

## 2017-07-05 PROCEDURE — 99232 SBSQ HOSP IP/OBS MODERATE 35: CPT

## 2017-07-05 PROCEDURE — 99233 SBSQ HOSP IP/OBS HIGH 50: CPT

## 2017-07-05 RX ORDER — TAMSULOSIN HYDROCHLORIDE 0.4 MG/1
0.4 CAPSULE ORAL
Qty: 0 | Refills: 0 | Status: DISCONTINUED | OUTPATIENT
Start: 2017-07-05 | End: 2017-07-31

## 2017-07-05 RX ORDER — FINASTERIDE 5 MG/1
5 TABLET, FILM COATED ORAL
Qty: 0 | Refills: 0 | Status: DISCONTINUED | OUTPATIENT
Start: 2017-07-05 | End: 2017-07-31

## 2017-07-05 RX ORDER — TUBERCULIN PURIFIED PROTEIN DERIVATIVE 5 [IU]/.1ML
5 INJECTION, SOLUTION INTRADERMAL ONCE
Qty: 0 | Refills: 0 | Status: COMPLETED | OUTPATIENT
Start: 2017-07-05 | End: 2017-07-06

## 2017-07-05 RX ADMIN — Medication 5 MILLIGRAM(S): at 20:45

## 2017-07-05 RX ADMIN — GABAPENTIN 300 MILLIGRAM(S): 400 CAPSULE ORAL at 20:45

## 2017-07-05 RX ADMIN — GABAPENTIN 300 MILLIGRAM(S): 400 CAPSULE ORAL at 09:23

## 2017-07-05 RX ADMIN — FINASTERIDE 5 MILLIGRAM(S): 5 TABLET, FILM COATED ORAL at 09:23

## 2017-07-05 RX ADMIN — GABAPENTIN 300 MILLIGRAM(S): 400 CAPSULE ORAL at 12:46

## 2017-07-05 RX ADMIN — RISPERIDONE 2 MILLIGRAM(S): 4 TABLET ORAL at 09:23

## 2017-07-05 RX ADMIN — RISPERIDONE 3 MILLIGRAM(S): 4 TABLET ORAL at 20:45

## 2017-07-05 NOTE — PROGRESS NOTE ADULT - ASSESSMENT
76M admitted for management of depression undergoing ECT, with prostate cancer, BPH, CLL, Right inguinal hernia, with urinary urgency, recent drug rash due to bupropion.    Plan:  Urinary urgency/BPH: Adjust proscar and flomax timing to with and after breakfast, respectively, which patient reports controlled his symptomsbefore admission.  UA and history not consistent with infection.    Prostate cancer: Would inform pt's urologist Dr Tiffani Pastor of current PSA level. Patient should follow up with her when discharged.     CLL: WBC stable, outpatient hematology f/u    Inguinal hernia: Stable/ Abd support. Outpatient surgery follow up. Surgery consult if pain develops.    Drug eruption due to bupropion: resolved with discontinuation of drug.    Depression: Managed by primary team, ECT

## 2017-07-05 NOTE — PROGRESS NOTE ADULT - SUBJECTIVE AND OBJECTIVE BOX
CC/Reason for Consult: elevated PSA, f/u urinary sx    SUBJECTIVE / OVERNIGHT EVENTS: Patient reports that he has had urinary urgency since admission to [facility] because of the change in his medication schedule: at home he takes proscar with breakfast and tamsulosin about a half hour later. He denies burning on urination of incontinence.  He says his current prostate cancer is under "active surveillance" by his urologist Dr Tiffani Pastor. He saw her about 1 year ago and plans to see her when discharged.    MEDICATIONS  (STANDING):  risperiDONE   Tablet 3 milliGRAM(s) Oral at bedtime  bisacodyl 5 milliGRAM(s) Oral at bedtime  risperiDONE   Tablet 2 milliGRAM(s) Oral daily  gabapentin 300 milliGRAM(s) Oral three times a day  finasteride 5 milliGRAM(s) Oral daily  tamsulosin 0.4 milliGRAM(s) Oral qhs  PPD  5 Tuberculin Unit(s) Injectable 5 Unit(s) IntraDermal once    MEDICATIONS  (PRN):  magnesium hydroxide Suspension 30 milliLiter(s) Oral daily PRN Constipation  diphenhydrAMINE   Capsule 25 milliGRAM(s) Oral every 6 hours PRN Rash and/or Itching  LORazepam     Tablet 0.5 milliGRAM(s) Oral every 6 hours PRN Anxiety  LORazepam   Injectable 1 milliGRAM(s) IntraMuscular once PRN Agitation      Vital Signs Last 24 Hrs  T(C): 36.6 (07-05-17 @ 06:18), Max: 36.9 (07-04-17 @ 15:40)  HR: 75 (07-04-17 @ 21:04) (75 - 75)  BP: 107/60 (07-04-17 @ 21:04) (107/60 - 107/60)  RR: 18 (07-05-17 @ 06:18) (18 - 18)  CAPILLARY BLOOD GLUCOSE    PHYSICAL EXAM:  GENERAL: NAD, well-developed  HEAD:  Atraumatic, Normocephalic  EYES: EOMI, PERRLA, conjunctiva and sclera clear  NECK: Supple, No JVD  CHEST/LUNG: Clear to auscultation bilaterally; No wheeze  HEART: Regular rate and rhythm; No murmurs, rubs, or gallops  ABDOMEN: Soft, Nontender, Nondistended; Bowel sounds present  EXTREMITIES:  2+ Peripheral Pulses, No clubbing, cyanosis, or edema  PSYCH: AAOx3  NEUROLOGY: non-focal  SKIN: No rashes or lesions    LABS:                        12.0   11.40 )-----------( 217      ( 05 Jul 2017 08:33 )             37.1     Prostate Ca Screen, PSA Total (06.25.17 @ 07:00)    Prostate Ca Screen, PSA Total: 34.68ng/mL    Urinalysis (06.24.17 @ 13:53)    Color: YELLOW    Urine Appearance: CLEAR    Glucose: NEGATIVE    Bilirubin: NEGATIVE    Ketone - Urine: NEGATIVE    Specific Gravity: 1.022    Blood: NEGATIVE    pH - Urine: 6.0    Protein, Urine: 10    Urobilinogen: NORMAL E.U.    Nitrite: NEGATIVE    Leukocyte Esterase Concentration: TRACE    Red Blood Cell - Urine: 2-5    White Blood Cell - Urine: 5-10    Mucus: FEW        Care Discussed with Consultants/Other Providers: Dr Richardson

## 2017-07-06 PROCEDURE — 99232 SBSQ HOSP IP/OBS MODERATE 35: CPT

## 2017-07-06 RX ADMIN — RISPERIDONE 2 MILLIGRAM(S): 4 TABLET ORAL at 08:56

## 2017-07-06 RX ADMIN — TAMSULOSIN HYDROCHLORIDE 0.4 MILLIGRAM(S): 0.4 CAPSULE ORAL at 10:59

## 2017-07-06 RX ADMIN — Medication 5 MILLIGRAM(S): at 20:41

## 2017-07-06 RX ADMIN — GABAPENTIN 300 MILLIGRAM(S): 400 CAPSULE ORAL at 13:22

## 2017-07-06 RX ADMIN — GABAPENTIN 300 MILLIGRAM(S): 400 CAPSULE ORAL at 08:56

## 2017-07-06 RX ADMIN — TUBERCULIN PURIFIED PROTEIN DERIVATIVE 5 UNIT(S): 5 INJECTION, SOLUTION INTRADERMAL at 21:50

## 2017-07-06 RX ADMIN — RISPERIDONE 3 MILLIGRAM(S): 4 TABLET ORAL at 20:41

## 2017-07-06 RX ADMIN — GABAPENTIN 300 MILLIGRAM(S): 400 CAPSULE ORAL at 20:41

## 2017-07-06 RX ADMIN — FINASTERIDE 5 MILLIGRAM(S): 5 TABLET, FILM COATED ORAL at 08:55

## 2017-07-07 PROCEDURE — 90870 ELECTROCONVULSIVE THERAPY: CPT

## 2017-07-07 PROCEDURE — 99232 SBSQ HOSP IP/OBS MODERATE 35: CPT | Mod: 25

## 2017-07-07 RX ADMIN — GABAPENTIN 300 MILLIGRAM(S): 400 CAPSULE ORAL at 21:11

## 2017-07-07 RX ADMIN — TAMSULOSIN HYDROCHLORIDE 0.4 MILLIGRAM(S): 0.4 CAPSULE ORAL at 12:14

## 2017-07-07 RX ADMIN — Medication 5 MILLIGRAM(S): at 21:11

## 2017-07-07 RX ADMIN — GABAPENTIN 300 MILLIGRAM(S): 400 CAPSULE ORAL at 12:14

## 2017-07-07 RX ADMIN — Medication 1 MILLIGRAM(S): at 06:15

## 2017-07-07 RX ADMIN — RISPERIDONE 3 MILLIGRAM(S): 4 TABLET ORAL at 21:11

## 2017-07-07 RX ADMIN — FINASTERIDE 5 MILLIGRAM(S): 5 TABLET, FILM COATED ORAL at 12:14

## 2017-07-08 RX ADMIN — RISPERIDONE 3 MILLIGRAM(S): 4 TABLET ORAL at 20:23

## 2017-07-08 RX ADMIN — GABAPENTIN 300 MILLIGRAM(S): 400 CAPSULE ORAL at 13:04

## 2017-07-08 RX ADMIN — RISPERIDONE 2 MILLIGRAM(S): 4 TABLET ORAL at 08:59

## 2017-07-08 RX ADMIN — GABAPENTIN 300 MILLIGRAM(S): 400 CAPSULE ORAL at 08:59

## 2017-07-08 RX ADMIN — Medication 5 MILLIGRAM(S): at 20:23

## 2017-07-08 RX ADMIN — GABAPENTIN 300 MILLIGRAM(S): 400 CAPSULE ORAL at 20:23

## 2017-07-08 RX ADMIN — TUBERCULIN PURIFIED PROTEIN DERIVATIVE 5 UNIT(S): 5 INJECTION, SOLUTION INTRADERMAL at 21:55

## 2017-07-08 RX ADMIN — TAMSULOSIN HYDROCHLORIDE 0.4 MILLIGRAM(S): 0.4 CAPSULE ORAL at 08:59

## 2017-07-08 RX ADMIN — FINASTERIDE 5 MILLIGRAM(S): 5 TABLET, FILM COATED ORAL at 08:59

## 2017-07-09 RX ADMIN — RISPERIDONE 2 MILLIGRAM(S): 4 TABLET ORAL at 08:35

## 2017-07-09 RX ADMIN — RISPERIDONE 3 MILLIGRAM(S): 4 TABLET ORAL at 20:16

## 2017-07-09 RX ADMIN — GABAPENTIN 300 MILLIGRAM(S): 400 CAPSULE ORAL at 08:35

## 2017-07-09 RX ADMIN — GABAPENTIN 300 MILLIGRAM(S): 400 CAPSULE ORAL at 14:20

## 2017-07-09 RX ADMIN — GABAPENTIN 300 MILLIGRAM(S): 400 CAPSULE ORAL at 20:16

## 2017-07-09 RX ADMIN — FINASTERIDE 5 MILLIGRAM(S): 5 TABLET, FILM COATED ORAL at 08:35

## 2017-07-09 RX ADMIN — TAMSULOSIN HYDROCHLORIDE 0.4 MILLIGRAM(S): 0.4 CAPSULE ORAL at 08:35

## 2017-07-09 RX ADMIN — Medication 5 MILLIGRAM(S): at 20:16

## 2017-07-10 PROCEDURE — 90870 ELECTROCONVULSIVE THERAPY: CPT

## 2017-07-10 RX ADMIN — Medication 5 MILLIGRAM(S): at 21:13

## 2017-07-10 RX ADMIN — GABAPENTIN 300 MILLIGRAM(S): 400 CAPSULE ORAL at 14:39

## 2017-07-10 RX ADMIN — Medication 1 MILLIGRAM(S): at 06:59

## 2017-07-10 RX ADMIN — RISPERIDONE 3 MILLIGRAM(S): 4 TABLET ORAL at 21:13

## 2017-07-10 RX ADMIN — GABAPENTIN 300 MILLIGRAM(S): 400 CAPSULE ORAL at 21:13

## 2017-07-11 PROCEDURE — 99232 SBSQ HOSP IP/OBS MODERATE 35: CPT

## 2017-07-11 RX ORDER — MAGNESIUM HYDROXIDE 400 MG/1
30 TABLET, CHEWABLE ORAL DAILY
Qty: 0 | Refills: 0 | Status: DISCONTINUED | OUTPATIENT
Start: 2017-07-11 | End: 2017-07-31

## 2017-07-11 RX ORDER — RISPERIDONE 4 MG/1
3 TABLET ORAL AT BEDTIME
Qty: 0 | Refills: 0 | Status: DISCONTINUED | OUTPATIENT
Start: 2017-07-11 | End: 2017-07-26

## 2017-07-11 RX ADMIN — GABAPENTIN 300 MILLIGRAM(S): 400 CAPSULE ORAL at 21:14

## 2017-07-11 RX ADMIN — Medication 5 MILLIGRAM(S): at 21:14

## 2017-07-11 RX ADMIN — RISPERIDONE 2 MILLIGRAM(S): 4 TABLET ORAL at 08:35

## 2017-07-11 RX ADMIN — GABAPENTIN 300 MILLIGRAM(S): 400 CAPSULE ORAL at 12:30

## 2017-07-11 RX ADMIN — GABAPENTIN 300 MILLIGRAM(S): 400 CAPSULE ORAL at 08:35

## 2017-07-11 RX ADMIN — FINASTERIDE 5 MILLIGRAM(S): 5 TABLET, FILM COATED ORAL at 08:35

## 2017-07-11 RX ADMIN — TAMSULOSIN HYDROCHLORIDE 0.4 MILLIGRAM(S): 0.4 CAPSULE ORAL at 09:15

## 2017-07-11 RX ADMIN — RISPERIDONE 3 MILLIGRAM(S): 4 TABLET ORAL at 21:14

## 2017-07-12 PROCEDURE — 99232 SBSQ HOSP IP/OBS MODERATE 35: CPT

## 2017-07-12 RX ADMIN — RISPERIDONE 3 MILLIGRAM(S): 4 TABLET ORAL at 20:30

## 2017-07-12 RX ADMIN — RISPERIDONE 2 MILLIGRAM(S): 4 TABLET ORAL at 09:14

## 2017-07-12 RX ADMIN — GABAPENTIN 300 MILLIGRAM(S): 400 CAPSULE ORAL at 09:14

## 2017-07-12 RX ADMIN — TAMSULOSIN HYDROCHLORIDE 0.4 MILLIGRAM(S): 0.4 CAPSULE ORAL at 10:42

## 2017-07-12 RX ADMIN — Medication 5 MILLIGRAM(S): at 20:30

## 2017-07-12 RX ADMIN — FINASTERIDE 5 MILLIGRAM(S): 5 TABLET, FILM COATED ORAL at 09:14

## 2017-07-12 RX ADMIN — GABAPENTIN 300 MILLIGRAM(S): 400 CAPSULE ORAL at 20:30

## 2017-07-12 RX ADMIN — GABAPENTIN 300 MILLIGRAM(S): 400 CAPSULE ORAL at 13:58

## 2017-07-13 PROCEDURE — 99232 SBSQ HOSP IP/OBS MODERATE 35: CPT

## 2017-07-13 RX ADMIN — FINASTERIDE 5 MILLIGRAM(S): 5 TABLET, FILM COATED ORAL at 09:40

## 2017-07-13 RX ADMIN — RISPERIDONE 2 MILLIGRAM(S): 4 TABLET ORAL at 09:43

## 2017-07-13 RX ADMIN — TAMSULOSIN HYDROCHLORIDE 0.4 MILLIGRAM(S): 0.4 CAPSULE ORAL at 09:43

## 2017-07-13 RX ADMIN — RISPERIDONE 3 MILLIGRAM(S): 4 TABLET ORAL at 20:42

## 2017-07-13 RX ADMIN — Medication 5 MILLIGRAM(S): at 20:42

## 2017-07-13 RX ADMIN — GABAPENTIN 300 MILLIGRAM(S): 400 CAPSULE ORAL at 13:29

## 2017-07-13 RX ADMIN — GABAPENTIN 300 MILLIGRAM(S): 400 CAPSULE ORAL at 20:42

## 2017-07-13 RX ADMIN — GABAPENTIN 300 MILLIGRAM(S): 400 CAPSULE ORAL at 09:43

## 2017-07-14 PROCEDURE — 99232 SBSQ HOSP IP/OBS MODERATE 35: CPT | Mod: 25

## 2017-07-14 PROCEDURE — 90870 ELECTROCONVULSIVE THERAPY: CPT

## 2017-07-14 RX ORDER — RISPERIDONE 4 MG/1
2 TABLET ORAL DAILY
Qty: 0 | Refills: 0 | Status: DISCONTINUED | OUTPATIENT
Start: 2017-07-14 | End: 2017-07-26

## 2017-07-14 RX ADMIN — RISPERIDONE 3 MILLIGRAM(S): 4 TABLET ORAL at 20:55

## 2017-07-14 RX ADMIN — GABAPENTIN 300 MILLIGRAM(S): 400 CAPSULE ORAL at 13:34

## 2017-07-14 RX ADMIN — GABAPENTIN 300 MILLIGRAM(S): 400 CAPSULE ORAL at 20:55

## 2017-07-14 RX ADMIN — Medication 5 MILLIGRAM(S): at 20:55

## 2017-07-14 RX ADMIN — RISPERIDONE 2 MILLIGRAM(S): 4 TABLET ORAL at 13:34

## 2017-07-15 RX ADMIN — RISPERIDONE 2 MILLIGRAM(S): 4 TABLET ORAL at 10:24

## 2017-07-15 RX ADMIN — GABAPENTIN 300 MILLIGRAM(S): 400 CAPSULE ORAL at 20:30

## 2017-07-15 RX ADMIN — GABAPENTIN 300 MILLIGRAM(S): 400 CAPSULE ORAL at 14:44

## 2017-07-15 RX ADMIN — Medication 5 MILLIGRAM(S): at 20:30

## 2017-07-15 RX ADMIN — FINASTERIDE 5 MILLIGRAM(S): 5 TABLET, FILM COATED ORAL at 10:24

## 2017-07-15 RX ADMIN — TAMSULOSIN HYDROCHLORIDE 0.4 MILLIGRAM(S): 0.4 CAPSULE ORAL at 10:24

## 2017-07-15 RX ADMIN — RISPERIDONE 3 MILLIGRAM(S): 4 TABLET ORAL at 20:30

## 2017-07-15 RX ADMIN — GABAPENTIN 300 MILLIGRAM(S): 400 CAPSULE ORAL at 10:24

## 2017-07-16 RX ADMIN — GABAPENTIN 300 MILLIGRAM(S): 400 CAPSULE ORAL at 10:20

## 2017-07-16 RX ADMIN — FINASTERIDE 5 MILLIGRAM(S): 5 TABLET, FILM COATED ORAL at 08:56

## 2017-07-16 RX ADMIN — GABAPENTIN 300 MILLIGRAM(S): 400 CAPSULE ORAL at 13:32

## 2017-07-16 RX ADMIN — TAMSULOSIN HYDROCHLORIDE 0.4 MILLIGRAM(S): 0.4 CAPSULE ORAL at 10:20

## 2017-07-16 RX ADMIN — Medication 5 MILLIGRAM(S): at 21:09

## 2017-07-16 RX ADMIN — RISPERIDONE 2 MILLIGRAM(S): 4 TABLET ORAL at 10:20

## 2017-07-16 RX ADMIN — GABAPENTIN 300 MILLIGRAM(S): 400 CAPSULE ORAL at 21:09

## 2017-07-16 RX ADMIN — RISPERIDONE 3 MILLIGRAM(S): 4 TABLET ORAL at 21:09

## 2017-07-17 PROCEDURE — 90870 ELECTROCONVULSIVE THERAPY: CPT

## 2017-07-17 PROCEDURE — 99232 SBSQ HOSP IP/OBS MODERATE 35: CPT | Mod: 25

## 2017-07-17 RX ADMIN — Medication 5 MILLIGRAM(S): at 20:23

## 2017-07-17 RX ADMIN — GABAPENTIN 300 MILLIGRAM(S): 400 CAPSULE ORAL at 20:23

## 2017-07-17 RX ADMIN — RISPERIDONE 2 MILLIGRAM(S): 4 TABLET ORAL at 13:35

## 2017-07-17 RX ADMIN — GABAPENTIN 300 MILLIGRAM(S): 400 CAPSULE ORAL at 13:35

## 2017-07-17 RX ADMIN — RISPERIDONE 3 MILLIGRAM(S): 4 TABLET ORAL at 20:23

## 2017-07-18 PROCEDURE — 99232 SBSQ HOSP IP/OBS MODERATE 35: CPT

## 2017-07-18 RX ADMIN — GABAPENTIN 300 MILLIGRAM(S): 400 CAPSULE ORAL at 20:08

## 2017-07-18 RX ADMIN — FINASTERIDE 5 MILLIGRAM(S): 5 TABLET, FILM COATED ORAL at 09:33

## 2017-07-18 RX ADMIN — GABAPENTIN 300 MILLIGRAM(S): 400 CAPSULE ORAL at 13:24

## 2017-07-18 RX ADMIN — RISPERIDONE 3 MILLIGRAM(S): 4 TABLET ORAL at 20:08

## 2017-07-18 RX ADMIN — RISPERIDONE 2 MILLIGRAM(S): 4 TABLET ORAL at 09:33

## 2017-07-18 RX ADMIN — GABAPENTIN 300 MILLIGRAM(S): 400 CAPSULE ORAL at 09:33

## 2017-07-18 RX ADMIN — Medication 5 MILLIGRAM(S): at 20:08

## 2017-07-18 RX ADMIN — TAMSULOSIN HYDROCHLORIDE 0.4 MILLIGRAM(S): 0.4 CAPSULE ORAL at 09:33

## 2017-07-19 PROCEDURE — 99232 SBSQ HOSP IP/OBS MODERATE 35: CPT

## 2017-07-19 RX ADMIN — RISPERIDONE 3 MILLIGRAM(S): 4 TABLET ORAL at 20:44

## 2017-07-19 RX ADMIN — FINASTERIDE 5 MILLIGRAM(S): 5 TABLET, FILM COATED ORAL at 08:43

## 2017-07-19 RX ADMIN — TAMSULOSIN HYDROCHLORIDE 0.4 MILLIGRAM(S): 0.4 CAPSULE ORAL at 11:18

## 2017-07-19 RX ADMIN — RISPERIDONE 2 MILLIGRAM(S): 4 TABLET ORAL at 08:43

## 2017-07-19 RX ADMIN — Medication 5 MILLIGRAM(S): at 20:43

## 2017-07-19 RX ADMIN — GABAPENTIN 300 MILLIGRAM(S): 400 CAPSULE ORAL at 20:43

## 2017-07-19 RX ADMIN — GABAPENTIN 300 MILLIGRAM(S): 400 CAPSULE ORAL at 08:43

## 2017-07-19 RX ADMIN — GABAPENTIN 300 MILLIGRAM(S): 400 CAPSULE ORAL at 13:13

## 2017-07-20 LAB
ALBUMIN SERPL ELPH-MCNC: 3.8 G/DL — SIGNIFICANT CHANGE UP (ref 3.3–5)
ALP SERPL-CCNC: 52 U/L — SIGNIFICANT CHANGE UP (ref 40–120)
ALT FLD-CCNC: 36 U/L — SIGNIFICANT CHANGE UP (ref 4–41)
AST SERPL-CCNC: 28 U/L — SIGNIFICANT CHANGE UP (ref 4–40)
BILIRUB SERPL-MCNC: 0.4 MG/DL — SIGNIFICANT CHANGE UP (ref 0.2–1.2)
BUN SERPL-MCNC: 19 MG/DL — SIGNIFICANT CHANGE UP (ref 7–23)
CALCIUM SERPL-MCNC: 9.5 MG/DL — SIGNIFICANT CHANGE UP (ref 8.4–10.5)
CHLORIDE SERPL-SCNC: 102 MMOL/L — SIGNIFICANT CHANGE UP (ref 98–107)
CO2 SERPL-SCNC: 25 MMOL/L — SIGNIFICANT CHANGE UP (ref 22–31)
CREAT SERPL-MCNC: 0.7 MG/DL — SIGNIFICANT CHANGE UP (ref 0.5–1.3)
GLUCOSE SERPL-MCNC: 84 MG/DL — SIGNIFICANT CHANGE UP (ref 70–99)
HCT VFR BLD CALC: 35.7 % — LOW (ref 39–50)
HGB BLD-MCNC: 11.6 G/DL — LOW (ref 13–17)
MCHC RBC-ENTMCNC: 32 PG — SIGNIFICANT CHANGE UP (ref 27–34)
MCHC RBC-ENTMCNC: 32.5 % — SIGNIFICANT CHANGE UP (ref 32–36)
MCV RBC AUTO: 98.3 FL — SIGNIFICANT CHANGE UP (ref 80–100)
NRBC # FLD: 0 — SIGNIFICANT CHANGE UP
PLATELET # BLD AUTO: 152 K/UL — SIGNIFICANT CHANGE UP (ref 150–400)
PMV BLD: 11.2 FL — SIGNIFICANT CHANGE UP (ref 7–13)
POTASSIUM SERPL-MCNC: 4.2 MMOL/L — SIGNIFICANT CHANGE UP (ref 3.5–5.3)
POTASSIUM SERPL-SCNC: 4.2 MMOL/L — SIGNIFICANT CHANGE UP (ref 3.5–5.3)
PROT SERPL-MCNC: 6.2 G/DL — SIGNIFICANT CHANGE UP (ref 6–8.3)
RBC # BLD: 3.63 M/UL — LOW (ref 4.2–5.8)
RBC # FLD: 12.3 % — SIGNIFICANT CHANGE UP (ref 10.3–14.5)
SODIUM SERPL-SCNC: 141 MMOL/L — SIGNIFICANT CHANGE UP (ref 135–145)
WBC # BLD: 11.13 K/UL — HIGH (ref 3.8–10.5)
WBC # FLD AUTO: 11.13 K/UL — HIGH (ref 3.8–10.5)

## 2017-07-20 PROCEDURE — 99232 SBSQ HOSP IP/OBS MODERATE 35: CPT

## 2017-07-20 RX ADMIN — RISPERIDONE 2 MILLIGRAM(S): 4 TABLET ORAL at 10:07

## 2017-07-20 RX ADMIN — GABAPENTIN 300 MILLIGRAM(S): 400 CAPSULE ORAL at 12:39

## 2017-07-20 RX ADMIN — FINASTERIDE 5 MILLIGRAM(S): 5 TABLET, FILM COATED ORAL at 10:07

## 2017-07-20 RX ADMIN — GABAPENTIN 300 MILLIGRAM(S): 400 CAPSULE ORAL at 10:07

## 2017-07-20 RX ADMIN — GABAPENTIN 300 MILLIGRAM(S): 400 CAPSULE ORAL at 20:06

## 2017-07-20 RX ADMIN — RISPERIDONE 3 MILLIGRAM(S): 4 TABLET ORAL at 20:06

## 2017-07-20 RX ADMIN — Medication 5 MILLIGRAM(S): at 20:06

## 2017-07-20 RX ADMIN — TAMSULOSIN HYDROCHLORIDE 0.4 MILLIGRAM(S): 0.4 CAPSULE ORAL at 10:07

## 2017-07-21 PROCEDURE — 99232 SBSQ HOSP IP/OBS MODERATE 35: CPT | Mod: 25

## 2017-07-21 PROCEDURE — 90870 ELECTROCONVULSIVE THERAPY: CPT

## 2017-07-21 RX ADMIN — GABAPENTIN 300 MILLIGRAM(S): 400 CAPSULE ORAL at 20:21

## 2017-07-21 RX ADMIN — Medication 5 MILLIGRAM(S): at 20:20

## 2017-07-21 RX ADMIN — GABAPENTIN 300 MILLIGRAM(S): 400 CAPSULE ORAL at 17:06

## 2017-07-21 RX ADMIN — RISPERIDONE 3 MILLIGRAM(S): 4 TABLET ORAL at 20:21

## 2017-07-22 RX ADMIN — RISPERIDONE 3 MILLIGRAM(S): 4 TABLET ORAL at 21:14

## 2017-07-22 RX ADMIN — FINASTERIDE 5 MILLIGRAM(S): 5 TABLET, FILM COATED ORAL at 09:54

## 2017-07-22 RX ADMIN — GABAPENTIN 300 MILLIGRAM(S): 400 CAPSULE ORAL at 21:14

## 2017-07-22 RX ADMIN — Medication 5 MILLIGRAM(S): at 21:14

## 2017-07-22 RX ADMIN — GABAPENTIN 300 MILLIGRAM(S): 400 CAPSULE ORAL at 13:22

## 2017-07-22 RX ADMIN — GABAPENTIN 300 MILLIGRAM(S): 400 CAPSULE ORAL at 09:54

## 2017-07-22 RX ADMIN — TAMSULOSIN HYDROCHLORIDE 0.4 MILLIGRAM(S): 0.4 CAPSULE ORAL at 09:54

## 2017-07-22 RX ADMIN — RISPERIDONE 2 MILLIGRAM(S): 4 TABLET ORAL at 09:54

## 2017-07-23 RX ADMIN — RISPERIDONE 2 MILLIGRAM(S): 4 TABLET ORAL at 09:12

## 2017-07-23 RX ADMIN — FINASTERIDE 5 MILLIGRAM(S): 5 TABLET, FILM COATED ORAL at 09:12

## 2017-07-23 RX ADMIN — GABAPENTIN 300 MILLIGRAM(S): 400 CAPSULE ORAL at 20:36

## 2017-07-23 RX ADMIN — GABAPENTIN 300 MILLIGRAM(S): 400 CAPSULE ORAL at 09:12

## 2017-07-23 RX ADMIN — TAMSULOSIN HYDROCHLORIDE 0.4 MILLIGRAM(S): 0.4 CAPSULE ORAL at 09:12

## 2017-07-23 RX ADMIN — RISPERIDONE 3 MILLIGRAM(S): 4 TABLET ORAL at 20:36

## 2017-07-23 RX ADMIN — Medication 5 MILLIGRAM(S): at 20:36

## 2017-07-23 RX ADMIN — GABAPENTIN 300 MILLIGRAM(S): 400 CAPSULE ORAL at 13:17

## 2017-07-24 PROCEDURE — 90870 ELECTROCONVULSIVE THERAPY: CPT

## 2017-07-24 PROCEDURE — 99232 SBSQ HOSP IP/OBS MODERATE 35: CPT | Mod: 25

## 2017-07-24 RX ORDER — FINASTERIDE 5 MG/1
1 TABLET, FILM COATED ORAL
Qty: 0 | Refills: 0 | COMMUNITY

## 2017-07-24 RX ORDER — GABAPENTIN 400 MG/1
3 CAPSULE ORAL
Qty: 0 | Refills: 0 | COMMUNITY

## 2017-07-24 RX ORDER — GABAPENTIN 400 MG/1
1 CAPSULE ORAL
Qty: 0 | Refills: 0 | COMMUNITY
Start: 2017-07-24

## 2017-07-24 RX ORDER — TAMSULOSIN HYDROCHLORIDE 0.4 MG/1
1 CAPSULE ORAL
Qty: 0 | Refills: 0 | COMMUNITY
Start: 2017-07-24

## 2017-07-24 RX ORDER — GABAPENTIN 400 MG/1
300 CAPSULE ORAL THREE TIMES A DAY
Qty: 0 | Refills: 0 | Status: DISCONTINUED | OUTPATIENT
Start: 2017-07-24 | End: 2017-07-31

## 2017-07-24 RX ORDER — FINASTERIDE 5 MG/1
1 TABLET, FILM COATED ORAL
Qty: 0 | Refills: 0 | COMMUNITY
Start: 2017-07-24

## 2017-07-24 RX ORDER — TAMSULOSIN HYDROCHLORIDE 0.4 MG/1
1 CAPSULE ORAL
Qty: 0 | Refills: 0 | COMMUNITY

## 2017-07-24 RX ADMIN — GABAPENTIN 300 MILLIGRAM(S): 400 CAPSULE ORAL at 21:23

## 2017-07-24 RX ADMIN — RISPERIDONE 3 MILLIGRAM(S): 4 TABLET ORAL at 21:23

## 2017-07-24 RX ADMIN — Medication 5 MILLIGRAM(S): at 21:23

## 2017-07-24 RX ADMIN — GABAPENTIN 300 MILLIGRAM(S): 400 CAPSULE ORAL at 17:02

## 2017-07-25 PROCEDURE — 99232 SBSQ HOSP IP/OBS MODERATE 35: CPT

## 2017-07-25 RX ADMIN — GABAPENTIN 300 MILLIGRAM(S): 400 CAPSULE ORAL at 12:54

## 2017-07-25 RX ADMIN — FINASTERIDE 5 MILLIGRAM(S): 5 TABLET, FILM COATED ORAL at 09:09

## 2017-07-25 RX ADMIN — GABAPENTIN 300 MILLIGRAM(S): 400 CAPSULE ORAL at 09:09

## 2017-07-25 RX ADMIN — RISPERIDONE 3 MILLIGRAM(S): 4 TABLET ORAL at 21:03

## 2017-07-25 RX ADMIN — Medication 5 MILLIGRAM(S): at 21:03

## 2017-07-25 RX ADMIN — RISPERIDONE 2 MILLIGRAM(S): 4 TABLET ORAL at 09:09

## 2017-07-25 RX ADMIN — TAMSULOSIN HYDROCHLORIDE 0.4 MILLIGRAM(S): 0.4 CAPSULE ORAL at 09:09

## 2017-07-25 RX ADMIN — GABAPENTIN 300 MILLIGRAM(S): 400 CAPSULE ORAL at 21:03

## 2017-07-26 PROCEDURE — 99232 SBSQ HOSP IP/OBS MODERATE 35: CPT | Mod: 25

## 2017-07-26 PROCEDURE — 90853 GROUP PSYCHOTHERAPY: CPT

## 2017-07-26 RX ORDER — RISPERIDONE 4 MG/1
3 TABLET ORAL AT BEDTIME
Qty: 0 | Refills: 0 | Status: DISCONTINUED | OUTPATIENT
Start: 2017-07-26 | End: 2017-07-31

## 2017-07-26 RX ADMIN — GABAPENTIN 300 MILLIGRAM(S): 400 CAPSULE ORAL at 08:46

## 2017-07-26 RX ADMIN — FINASTERIDE 5 MILLIGRAM(S): 5 TABLET, FILM COATED ORAL at 08:46

## 2017-07-26 RX ADMIN — RISPERIDONE 3 MILLIGRAM(S): 4 TABLET ORAL at 20:49

## 2017-07-26 RX ADMIN — GABAPENTIN 300 MILLIGRAM(S): 400 CAPSULE ORAL at 20:49

## 2017-07-26 RX ADMIN — GABAPENTIN 300 MILLIGRAM(S): 400 CAPSULE ORAL at 14:23

## 2017-07-26 RX ADMIN — Medication 5 MILLIGRAM(S): at 20:49

## 2017-07-26 RX ADMIN — TAMSULOSIN HYDROCHLORIDE 0.4 MILLIGRAM(S): 0.4 CAPSULE ORAL at 08:46

## 2017-07-27 PROCEDURE — 99232 SBSQ HOSP IP/OBS MODERATE 35: CPT

## 2017-07-27 RX ADMIN — GABAPENTIN 300 MILLIGRAM(S): 400 CAPSULE ORAL at 12:50

## 2017-07-27 RX ADMIN — Medication 5 MILLIGRAM(S): at 20:15

## 2017-07-27 RX ADMIN — FINASTERIDE 5 MILLIGRAM(S): 5 TABLET, FILM COATED ORAL at 10:11

## 2017-07-27 RX ADMIN — GABAPENTIN 300 MILLIGRAM(S): 400 CAPSULE ORAL at 10:11

## 2017-07-27 RX ADMIN — TAMSULOSIN HYDROCHLORIDE 0.4 MILLIGRAM(S): 0.4 CAPSULE ORAL at 11:33

## 2017-07-27 RX ADMIN — RISPERIDONE 3 MILLIGRAM(S): 4 TABLET ORAL at 20:15

## 2017-07-27 RX ADMIN — GABAPENTIN 300 MILLIGRAM(S): 400 CAPSULE ORAL at 20:15

## 2017-07-28 PROCEDURE — 90870 ELECTROCONVULSIVE THERAPY: CPT

## 2017-07-28 PROCEDURE — 99232 SBSQ HOSP IP/OBS MODERATE 35: CPT | Mod: 25

## 2017-07-28 RX ADMIN — Medication 5 MILLIGRAM(S): at 20:57

## 2017-07-28 RX ADMIN — RISPERIDONE 3 MILLIGRAM(S): 4 TABLET ORAL at 20:57

## 2017-07-28 RX ADMIN — GABAPENTIN 300 MILLIGRAM(S): 400 CAPSULE ORAL at 20:57

## 2017-07-28 RX ADMIN — GABAPENTIN 300 MILLIGRAM(S): 400 CAPSULE ORAL at 13:48

## 2017-07-29 RX ADMIN — GABAPENTIN 300 MILLIGRAM(S): 400 CAPSULE ORAL at 20:28

## 2017-07-29 RX ADMIN — GABAPENTIN 300 MILLIGRAM(S): 400 CAPSULE ORAL at 13:03

## 2017-07-29 RX ADMIN — GABAPENTIN 300 MILLIGRAM(S): 400 CAPSULE ORAL at 09:22

## 2017-07-29 RX ADMIN — TAMSULOSIN HYDROCHLORIDE 0.4 MILLIGRAM(S): 0.4 CAPSULE ORAL at 11:00

## 2017-07-29 RX ADMIN — RISPERIDONE 3 MILLIGRAM(S): 4 TABLET ORAL at 20:28

## 2017-07-29 RX ADMIN — Medication 5 MILLIGRAM(S): at 20:28

## 2017-07-29 RX ADMIN — FINASTERIDE 5 MILLIGRAM(S): 5 TABLET, FILM COATED ORAL at 09:22

## 2017-07-30 RX ADMIN — TAMSULOSIN HYDROCHLORIDE 0.4 MILLIGRAM(S): 0.4 CAPSULE ORAL at 10:28

## 2017-07-30 RX ADMIN — RISPERIDONE 3 MILLIGRAM(S): 4 TABLET ORAL at 20:36

## 2017-07-30 RX ADMIN — GABAPENTIN 300 MILLIGRAM(S): 400 CAPSULE ORAL at 10:28

## 2017-07-30 RX ADMIN — GABAPENTIN 300 MILLIGRAM(S): 400 CAPSULE ORAL at 20:36

## 2017-07-30 RX ADMIN — FINASTERIDE 5 MILLIGRAM(S): 5 TABLET, FILM COATED ORAL at 09:01

## 2017-07-30 RX ADMIN — GABAPENTIN 300 MILLIGRAM(S): 400 CAPSULE ORAL at 16:56

## 2017-07-30 RX ADMIN — Medication 5 MILLIGRAM(S): at 20:36

## 2017-07-31 VITALS
HEART RATE: 86 BPM | DIASTOLIC BLOOD PRESSURE: 66 MMHG | TEMPERATURE: 98 F | SYSTOLIC BLOOD PRESSURE: 124 MMHG | OXYGEN SATURATION: 100 % | RESPIRATION RATE: 17 BRPM

## 2017-07-31 PROCEDURE — 90870 ELECTROCONVULSIVE THERAPY: CPT

## 2017-07-31 PROCEDURE — 99238 HOSP IP/OBS DSCHRG MGMT 30/<: CPT | Mod: 25

## 2017-07-31 RX ORDER — RISPERIDONE 4 MG/1
1 TABLET ORAL
Qty: 0 | Refills: 0 | COMMUNITY
Start: 2017-07-31

## 2017-07-31 RX ORDER — GABAPENTIN 400 MG/1
1 CAPSULE ORAL
Qty: 0 | Refills: 0 | COMMUNITY
Start: 2017-07-31

## 2017-07-31 RX ADMIN — GABAPENTIN 300 MILLIGRAM(S): 400 CAPSULE ORAL at 12:49

## 2017-07-31 RX ADMIN — FINASTERIDE 5 MILLIGRAM(S): 5 TABLET, FILM COATED ORAL at 12:49

## 2017-07-31 RX ADMIN — TAMSULOSIN HYDROCHLORIDE 0.4 MILLIGRAM(S): 0.4 CAPSULE ORAL at 12:49

## 2017-08-08 ENCOUNTER — OUTPATIENT (OUTPATIENT)
Dept: OUTPATIENT SERVICES | Facility: HOSPITAL | Age: 77
LOS: 1 days | Discharge: HOME | End: 2017-08-08
Payer: MEDICARE

## 2017-08-08 DIAGNOSIS — F33.9 MAJOR DEPRESSIVE DISORDER, RECURRENT, UNSPECIFIED: ICD-10-CM

## 2017-08-08 DIAGNOSIS — Z98.49 CATARACT EXTRACTION STATUS, UNSPECIFIED EYE: Chronic | ICD-10-CM

## 2017-08-10 PROCEDURE — 90870 ELECTROCONVULSIVE THERAPY: CPT

## 2017-08-16 PROCEDURE — 90870 ELECTROCONVULSIVE THERAPY: CPT

## 2017-08-24 ENCOUNTER — OUTPATIENT (OUTPATIENT)
Dept: OUTPATIENT SERVICES | Facility: HOSPITAL | Age: 77
LOS: 1 days | Discharge: HOME | End: 2017-08-24
Payer: SELF-PAY

## 2017-08-24 DIAGNOSIS — Z98.49 CATARACT EXTRACTION STATUS, UNSPECIFIED EYE: Chronic | ICD-10-CM

## 2017-08-25 DIAGNOSIS — F33.3 MAJOR DEPRESSIVE DISORDER, RECURRENT, SEVERE WITH PSYCHOTIC SYMPTOMS: ICD-10-CM

## 2017-08-25 PROCEDURE — 90870 ELECTROCONVULSIVE THERAPY: CPT

## 2017-08-31 PROCEDURE — 90870 ELECTROCONVULSIVE THERAPY: CPT

## 2017-09-07 PROCEDURE — 90870 ELECTROCONVULSIVE THERAPY: CPT

## 2017-09-13 PROCEDURE — 90870 ELECTROCONVULSIVE THERAPY: CPT

## 2017-09-20 PROCEDURE — 90870 ELECTROCONVULSIVE THERAPY: CPT

## 2017-10-06 PROCEDURE — 90870 ELECTROCONVULSIVE THERAPY: CPT

## 2017-10-18 PROCEDURE — 90870 ELECTROCONVULSIVE THERAPY: CPT

## 2017-10-31 PROCEDURE — 90870 ELECTROCONVULSIVE THERAPY: CPT

## 2017-11-14 PROCEDURE — 90870 ELECTROCONVULSIVE THERAPY: CPT

## 2017-11-28 PROCEDURE — 90870 ELECTROCONVULSIVE THERAPY: CPT

## 2017-12-12 PROCEDURE — 90870 ELECTROCONVULSIVE THERAPY: CPT

## 2017-12-26 PROCEDURE — 90870 ELECTROCONVULSIVE THERAPY: CPT

## 2018-01-09 PROCEDURE — 90870 ELECTROCONVULSIVE THERAPY: CPT

## 2018-01-23 PROCEDURE — 90870 ELECTROCONVULSIVE THERAPY: CPT

## 2018-02-06 PROCEDURE — 90870 ELECTROCONVULSIVE THERAPY: CPT

## 2018-02-20 PROCEDURE — 90870 ELECTROCONVULSIVE THERAPY: CPT

## 2018-03-06 PROCEDURE — 90870 ELECTROCONVULSIVE THERAPY: CPT

## 2018-03-27 PROCEDURE — 90870 ELECTROCONVULSIVE THERAPY: CPT

## 2018-04-17 PROCEDURE — 90870 ELECTROCONVULSIVE THERAPY: CPT

## 2018-04-27 DIAGNOSIS — N40.0 BENIGN PROSTATIC HYPERPLASIA WITHOUT LOWER URINARY TRACT SYMPTOMS: ICD-10-CM

## 2018-04-27 DIAGNOSIS — K40.90 UNILATERAL INGUINAL HERNIA, WITHOUT OBSTRUCTION OR GANGRENE, NOT SPECIFIED AS RECURRENT: ICD-10-CM

## 2018-04-27 DIAGNOSIS — C95.90 LEUKEMIA, UNSPECIFIED NOT HAVING ACHIEVED REMISSION: ICD-10-CM

## 2018-05-08 PROCEDURE — 90870 ELECTROCONVULSIVE THERAPY: CPT

## 2018-05-29 PROCEDURE — 90870 ELECTROCONVULSIVE THERAPY: CPT

## 2018-06-19 PROCEDURE — 90870 ELECTROCONVULSIVE THERAPY: CPT

## 2018-07-10 PROCEDURE — 90870 ELECTROCONVULSIVE THERAPY: CPT

## 2018-08-07 PROCEDURE — 90870 ELECTROCONVULSIVE THERAPY: CPT

## 2018-09-04 PROCEDURE — 90870 ELECTROCONVULSIVE THERAPY: CPT

## 2018-10-02 PROCEDURE — 90870 ELECTROCONVULSIVE THERAPY: CPT

## 2018-11-08 PROCEDURE — 90870 ELECTROCONVULSIVE THERAPY: CPT

## 2018-11-08 RX ORDER — MIDAZOLAM HYDROCHLORIDE 1 MG/ML
2 INJECTION, SOLUTION INTRAMUSCULAR; INTRAVENOUS ONCE
Qty: 0 | Refills: 0 | Status: DISCONTINUED | OUTPATIENT
Start: 2018-11-08 | End: 2018-11-08

## 2018-12-04 PROCEDURE — 90870 ELECTROCONVULSIVE THERAPY: CPT

## 2019-01-23 NOTE — PROGRESS NOTE ADULT - PROBLEM/PLAN-1
Patient scheduled for DEXA for 2/25/19. Please submit order.   
done  
DISPLAY PLAN FREE TEXT

## 2019-01-31 PROCEDURE — 90870 ELECTROCONVULSIVE THERAPY: CPT

## 2019-03-19 PROCEDURE — 90870 ELECTROCONVULSIVE THERAPY: CPT

## 2019-04-25 PROCEDURE — 90870 ELECTROCONVULSIVE THERAPY: CPT

## 2019-05-23 PROCEDURE — 90870 ELECTROCONVULSIVE THERAPY: CPT

## 2019-06-24 PROCEDURE — 90870 ELECTROCONVULSIVE THERAPY: CPT

## 2019-07-26 PROCEDURE — 90870 ELECTROCONVULSIVE THERAPY: CPT

## 2019-08-28 PROCEDURE — 90870 ELECTROCONVULSIVE THERAPY: CPT

## 2019-09-25 PROCEDURE — 90870 ELECTROCONVULSIVE THERAPY: CPT

## 2019-10-30 PROCEDURE — 90870 ELECTROCONVULSIVE THERAPY: CPT

## 2019-11-29 PROCEDURE — 90870 ELECTROCONVULSIVE THERAPY: CPT

## 2019-12-24 PROCEDURE — 90870 ELECTROCONVULSIVE THERAPY: CPT

## 2019-12-24 RX ORDER — FLUMAZENIL 0.1 MG/ML
0.2 VIAL (ML) INTRAVENOUS ONCE
Refills: 0 | Status: COMPLETED | OUTPATIENT
Start: 2019-12-24 | End: 2019-12-24

## 2019-12-24 RX ADMIN — Medication 0.2 MILLIGRAM(S): at 09:32

## 2020-02-05 PROCEDURE — 90870 ELECTROCONVULSIVE THERAPY: CPT

## 2020-07-24 NOTE — ED PROVIDER NOTE - GASTROINTESTINAL [+], MLM
This is a patient of Dr Guzman Lopez and wants to be seen by Dr Gris Parekh in [facility]  Patient said he has been having kidney stone pain on and off  Please call patient back at [phone]  VOMITING/NAUSEA NAUSEA/VOMITING/now resolved

## 2020-08-27 NOTE — ED ADULT TRIAGE NOTE - WEIGHT IN LBS
160 Mildly to Moderately Impaired: difficulty hearing in some environments or speaker may need to increase volume or speak distinctly

## 2021-07-13 NOTE — ED PROVIDER NOTE - CHPI ED SYMPTOMS NEG
Lab Result: INR 9 37   Date/Time Drawn: 07/13 @ 1336   Ordering Provider: Dr Camilla Hobbs Name: Dariel Adelines  The following critical/stat result was read back to the lab as stated above and [employer] to the on-call provider 
no fever, no chills, no n/v/d

## 2023-04-19 NOTE — ED ADULT TRIAGE NOTE - NS ED NURSE AMBULANCES
[facility] PROVIDER:[TOKEN:[[id number]],FOLLOWUP:[2 weeks]]

## 2024-02-21 NOTE — ED PROVIDER NOTE - GENITOURINARY NEGATIVE STATEMENT, MLM
- H/o right sided complex regional pain syndrome. Presents with worsening pain not responding to oral oxycodone.   - C/w home dose of Duloxetine, pregabalin, and tylenol.   - Dilaudid 4mg q 4hr prn  - Lidocaine patch.   - C/w Robaxin  - Pain management consulted Mild pain (score 1-3)  - Non-pharmacological pain treatment recommendations  - Warm/ Cool packs PRN   - Repositioning, imagery, relaxation, distraction.  - Physical therapy OOB if no contraindications   Recommendations discussed with primary team and RN. *Pt needs to be prescribed narcan rescue kit upon discharge*. Recommend telepain Dr. Gale at [phone] upon discharge- referral sent no dysuria, no frequency, and no hematuria.

## 2025-07-12 NOTE — ED ADULT NURSE NOTE - CHIEF COMPLAINT
The patient is a 76y Male complaining of Bleeding that does not stop/Pain not relieved by Medications/Fever greater than (need to indicate Fahrenheit or Celsius)/Wound/Surgical Site with redness, or foul smelling discharge or pus